# Patient Record
Sex: MALE | Race: WHITE | HISPANIC OR LATINO | Employment: OTHER | URBAN - METROPOLITAN AREA
[De-identification: names, ages, dates, MRNs, and addresses within clinical notes are randomized per-mention and may not be internally consistent; named-entity substitution may affect disease eponyms.]

---

## 2017-12-01 ENCOUNTER — APPOINTMENT (EMERGENCY)
Dept: CT IMAGING | Facility: HOSPITAL | Age: 57
End: 2017-12-01
Payer: MEDICARE

## 2017-12-01 ENCOUNTER — HOSPITAL ENCOUNTER (EMERGENCY)
Facility: HOSPITAL | Age: 57
Discharge: HOME | End: 2017-12-01
Attending: EMERGENCY MEDICINE | Admitting: EMERGENCY MEDICINE
Payer: MEDICARE

## 2017-12-01 VITALS
BODY MASS INDEX: 32.1 KG/M2 | HEIGHT: 64 IN | HEART RATE: 82 BPM | TEMPERATURE: 99.1 F | WEIGHT: 188 LBS | RESPIRATION RATE: 16 BRPM | OXYGEN SATURATION: 99 % | SYSTOLIC BLOOD PRESSURE: 178 MMHG | DIASTOLIC BLOOD PRESSURE: 79 MMHG

## 2017-12-01 DIAGNOSIS — I10 HYPERTENSION: Primary | ICD-10-CM

## 2017-12-01 DIAGNOSIS — R73.9 HYPERGLYCEMIA: ICD-10-CM

## 2017-12-01 DIAGNOSIS — N28.9 RENAL INSUFFICIENCY: ICD-10-CM

## 2017-12-01 LAB
ANION GAP SERPL CALCULATED.3IONS-SCNC: 9 MMOL/L (ref 4–13)
APTT PPP: 26 SECONDS (ref 23–35)
BACTERIA UR QL AUTO: ABNORMAL /HPF
BASOPHILS # BLD AUTO: 0.02 THOUSANDS/ΜL (ref 0–0.1)
BASOPHILS NFR BLD AUTO: 0 % (ref 0–1)
BILIRUB UR QL STRIP: NEGATIVE
BUN SERPL-MCNC: 13 MG/DL (ref 5–25)
CALCIUM SERPL-MCNC: 9.1 MG/DL (ref 8.3–10.1)
CAOX CRY URNS QL MICRO: ABNORMAL /HPF
CHLORIDE SERPL-SCNC: 98 MMOL/L (ref 100–108)
CLARITY UR: ABNORMAL
CO2 SERPL-SCNC: 32 MMOL/L (ref 21–32)
COLOR UR: ABNORMAL
CREAT SERPL-MCNC: 1.84 MG/DL (ref 0.6–1.3)
EOSINOPHIL # BLD AUTO: 0.11 THOUSAND/ΜL (ref 0–0.61)
EOSINOPHIL NFR BLD AUTO: 1 % (ref 0–6)
ERYTHROCYTE [DISTWIDTH] IN BLOOD BY AUTOMATED COUNT: 13 % (ref 11.6–15.1)
EST. AVERAGE GLUCOSE BLD GHB EST-MCNC: 183 MG/DL
GFR SERPL CREATININE-BSD FRML MDRD: 40 ML/MIN/1.73SQ M
GLUCOSE SERPL-MCNC: 289 MG/DL (ref 65–140)
GLUCOSE UR STRIP-MCNC: ABNORMAL MG/DL
HBA1C MFR BLD: 8 % (ref 4.2–6.3)
HCT VFR BLD AUTO: 38.9 % (ref 36.5–49.3)
HGB BLD-MCNC: 13.1 G/DL (ref 12–17)
HGB UR QL STRIP.AUTO: ABNORMAL
INR PPP: 0.95 (ref 0.86–1.16)
KETONES UR STRIP-MCNC: ABNORMAL MG/DL
LEUKOCYTE ESTERASE UR QL STRIP: NEGATIVE
LYMPHOCYTES # BLD AUTO: 2.3 THOUSANDS/ΜL (ref 0.6–4.47)
LYMPHOCYTES NFR BLD AUTO: 29 % (ref 14–44)
MCH RBC QN AUTO: 30.3 PG (ref 26.8–34.3)
MCHC RBC AUTO-ENTMCNC: 33.7 G/DL (ref 31.4–37.4)
MCV RBC AUTO: 90 FL (ref 82–98)
MONOCYTES # BLD AUTO: 0.44 THOUSAND/ΜL (ref 0.17–1.22)
MONOCYTES NFR BLD AUTO: 6 % (ref 4–12)
NEUTROPHILS # BLD AUTO: 4.98 THOUSANDS/ΜL (ref 1.85–7.62)
NEUTS SEG NFR BLD AUTO: 64 % (ref 43–75)
NITRITE UR QL STRIP: NEGATIVE
NON-SQ EPI CELLS URNS QL MICRO: ABNORMAL /HPF
NRBC BLD AUTO-RTO: 0 /100 WBCS
PH UR STRIP.AUTO: 6 [PH] (ref 4.5–8)
PLATELET # BLD AUTO: 233 THOUSANDS/UL (ref 149–390)
PMV BLD AUTO: 11.1 FL (ref 8.9–12.7)
POTASSIUM SERPL-SCNC: 3.5 MMOL/L (ref 3.5–5.3)
PROT UR STRIP-MCNC: ABNORMAL MG/DL
PROTHROMBIN TIME: 12.7 SECONDS (ref 12.1–14.4)
RBC # BLD AUTO: 4.32 MILLION/UL (ref 3.88–5.62)
RBC #/AREA URNS AUTO: ABNORMAL /HPF
SODIUM SERPL-SCNC: 139 MMOL/L (ref 136–145)
SP GR UR STRIP.AUTO: 1.02 (ref 1–1.03)
SPECIMEN SOURCE: NORMAL
TROPONIN I BLD-MCNC: 0 NG/ML (ref 0–0.08)
UROBILINOGEN UR QL STRIP.AUTO: 0.2 E.U./DL
WBC # BLD AUTO: 7.85 THOUSAND/UL (ref 4.31–10.16)
WBC #/AREA URNS AUTO: ABNORMAL /HPF

## 2017-12-01 PROCEDURE — 81001 URINALYSIS AUTO W/SCOPE: CPT

## 2017-12-01 PROCEDURE — 83036 HEMOGLOBIN GLYCOSYLATED A1C: CPT | Performed by: EMERGENCY MEDICINE

## 2017-12-01 PROCEDURE — 36415 COLL VENOUS BLD VENIPUNCTURE: CPT | Performed by: EMERGENCY MEDICINE

## 2017-12-01 PROCEDURE — 85025 COMPLETE CBC W/AUTO DIFF WBC: CPT | Performed by: EMERGENCY MEDICINE

## 2017-12-01 PROCEDURE — 80048 BASIC METABOLIC PNL TOTAL CA: CPT | Performed by: EMERGENCY MEDICINE

## 2017-12-01 PROCEDURE — 85730 THROMBOPLASTIN TIME PARTIAL: CPT | Performed by: EMERGENCY MEDICINE

## 2017-12-01 PROCEDURE — 70450 CT HEAD/BRAIN W/O DYE: CPT

## 2017-12-01 PROCEDURE — 85610 PROTHROMBIN TIME: CPT | Performed by: EMERGENCY MEDICINE

## 2017-12-01 PROCEDURE — 99284 EMERGENCY DEPT VISIT MOD MDM: CPT

## 2017-12-01 PROCEDURE — 84484 ASSAY OF TROPONIN QUANT: CPT

## 2017-12-01 PROCEDURE — 93005 ELECTROCARDIOGRAM TRACING: CPT | Performed by: EMERGENCY MEDICINE

## 2017-12-01 PROCEDURE — 81002 URINALYSIS NONAUTO W/O SCOPE: CPT | Performed by: EMERGENCY MEDICINE

## 2017-12-01 RX ORDER — LOSARTAN POTASSIUM 50 MG/1
100 TABLET ORAL ONCE
Status: COMPLETED | OUTPATIENT
Start: 2017-12-01 | End: 2017-12-01

## 2017-12-01 RX ADMIN — LOSARTAN POTASSIUM 100 MG: 50 TABLET, FILM COATED ORAL at 17:59

## 2017-12-01 RX ADMIN — METOPROLOL TARTRATE 25 MG: 25 TABLET ORAL at 17:58

## 2017-12-02 LAB
ATRIAL RATE: 92 BPM
P AXIS: 62 DEGREES
PR INTERVAL: 162 MS
QRS AXIS: 76 DEGREES
QRSD INTERVAL: 92 MS
QT INTERVAL: 332 MS
QTC INTERVAL: 410 MS
T WAVE AXIS: -87 DEGREES
VENTRICULAR RATE: 92 BPM

## 2017-12-02 NOTE — ED PROVIDER NOTES
History  Chief Complaint   Patient presents with    Hypertension     pt was at [facility] today for check up, was noted to have high blood pressure, high blood sugar, and blurry vision for the last several days and was told to come to ER because he could have a stroke  Pt drove himself to [facility] appointment, drove himslef here to ER, no deficits noted in Triage  History provided by:  Patient and relative   used: Yes    Medical Problem - Major   Location:  Sent in by the primary care doctor for hypertension and there was apparently some concern about a stroke and high blood sugar  Severity:  Moderate  Onset quality:  Unable to specify  Duration: Last several days  Timing:  Intermittent  Progression:  Resolved  Chronicity:  New  Relieved by:  Nothing  Worsened by: Anxiety  Ineffective treatments:  None tried  Associated symptoms: fatigue    Associated symptoms: no abdominal pain, no chest pain, no congestion, no cough, no diarrhea, no fever, no headaches, no nausea, no shortness of breath, no sore throat and no vomiting     Patient with a history of hypertension and diabetes who has been complaining of blurry vision, occasional headaches for the last several days  Was at his family doctor's office when his blood pressure was very high and so sent here to the emergency room for further workup and evaluation  The patient states that he just walked in the office and he took his blood pressure is a little anxious  He is much more relaxed now and his blood pressure has improved  He has no headache  No visual changes the present time  He complains of no weakness or numbness  He has no chest pain or shortness of breath  There is no leg edema  He did not take any of his medicines today prior to his appointment  Prior to Admission Medications   Prescriptions Last Dose Informant Patient Reported? Taking?    apixaban (ELIQUIS) 5 mg 11/30/2017 at Unknown time  No Yes   Sig: Take 1 tablet by mouth 2 (two) times a day for 30 days   insulin aspart protamine-insulin aspart (NovoLOG 70/30) 100 units/mL injection 11/30/2017 at Unknown time  No Yes   Sig: Inject 20 Units under the skin 2 (two) times a day before meals for 30 days   losartan (COZAAR) 100 MG tablet 11/30/2017 at Unknown time  No Yes   Sig: Take 1 tablet by mouth daily for 14 days   metFORMIN (GLUCOPHAGE) 1000 MG tablet 11/30/2017 at Unknown time  No Yes   Sig: Take 1 tablet by mouth 2 (two) times a day with meals for 30 days   metoprolol tartrate (LOPRESSOR) 25 mg tablet 11/30/2017 at Unknown time  No Yes   Sig: Take 1 tablet by mouth 2 (two) times a day for 30 days   omeprazole (PriLOSEC) 20 mg delayed release capsule 11/30/2017 at Unknown time  No Yes   Sig: Take 1 capsule by mouth daily  simvastatin (ZOCOR) 40 mg tablet 11/30/2017 at Unknown time  Yes Yes   Sig: Take 40 mg by mouth daily at bedtime  Facility-Administered Medications: None       Past Medical History:   Diagnosis Date    Diabetes mellitus ([facility] [address] )     Hyperlipidemia     Hypertension     Tobacco abuse        Past Surgical History:   Procedure Laterality Date    APPENDECTOMY      SHOULDER ARTHROSCOPY         History reviewed  No pertinent family history  I have reviewed and agree with the history as documented  Social History   Substance Use Topics    Smoking status: Current Every Day Smoker     Packs/day: 1 00     Types: Cigarettes    Smokeless tobacco: Never Used    Alcohol use No        Review of Systems   Constitutional: Positive for fatigue  Negative for chills and fever  HENT: Negative for congestion and sore throat  Respiratory: Negative for cough, chest tightness and shortness of breath  Cardiovascular: Negative for chest pain and leg swelling  Gastrointestinal: Negative for abdominal pain, diarrhea, nausea and vomiting  Genitourinary: Negative for dysuria  Musculoskeletal: Negative for arthralgias, back pain and neck pain     Neurological: Negative for dizziness, facial asymmetry, speech difficulty, weakness, light-headedness, numbness and headaches  All other systems reviewed and are negative  Physical Exam  ED Triage Vitals [12/01/17 1659]   Temperature Pulse Respirations Blood Pressure SpO2   99 1 °F (37 3 °C) 87 18 (!) 172/87 98 %      Temp Source Heart Rate Source Patient Position - Orthostatic VS BP Location FiO2 (%)   Temporal Monitor Sitting Left arm --      Pain Score       No Pain           Orthostatic Vital Signs  Vitals:    12/01/17 1659 12/01/17 1756 12/01/17 1855   BP: (!) 172/87 169/78 (!) 178/79   Pulse: 87 90 82   Patient Position - Orthostatic VS: Sitting Lying Lying       Physical Exam   Constitutional: He appears well-developed and well-nourished  He is cooperative  Non-toxic appearance  He does not have a sickly appearance  He does not appear ill  No distress  HENT:   Head: Normocephalic and atraumatic  Right Ear: Hearing normal  No drainage or swelling  Left Ear: Hearing normal  No drainage or swelling  Mouth/Throat: Mucous membranes are normal    Eyes: Conjunctivae, EOM and lids are normal  Pupils are equal, round, and reactive to light  Right eye exhibits no discharge  Left eye exhibits no discharge  Neck: Trachea normal and normal range of motion  Neck supple  No JVD present  Cardiovascular: Normal rate, regular rhythm, normal heart sounds, intact distal pulses and normal pulses  Exam reveals no gallop and no friction rub  No murmur heard  Pulmonary/Chest: Effort normal and breath sounds normal  No stridor  No respiratory distress  He has no wheezes  He has no rales  He exhibits no tenderness  Abdominal: Soft  Normal appearance  He exhibits no ascites and no mass  There is no hepatosplenomegaly  There is no tenderness  There is no rebound, no guarding and no CVA tenderness  Musculoskeletal: Normal range of motion  He exhibits no edema  Lymphadenopathy:     He has no cervical adenopathy          Right: No inguinal adenopathy present  Left: No inguinal adenopathy present  Neurological: He is alert  He has normal strength  No cranial nerve deficit or sensory deficit  He exhibits normal muscle tone  Coordination and gait normal  GCS eye subscore is 4  GCS verbal subscore is 5  GCS motor subscore is 6  Skin: Skin is warm, dry and intact  No rash noted  He is not diaphoretic  No pallor  Psychiatric: He has a normal mood and affect  His speech is normal  Cognition and memory are normal    Nursing note and vitals reviewed        ED Medications  Medications   losartan (COZAAR) tablet 100 mg (100 mg Oral Given 12/1/17 1759)   metoprolol tartrate (LOPRESSOR) tablet 25 mg (25 mg Oral Given 12/1/17 1758)       Diagnostic Studies  Results Reviewed     Procedure Component Value Units Date/Time    Hemoglobin A1c [[id number]]  (Abnormal) Collected:  12/01/17 1751    Lab Status:  Final result Specimen:  Blood from Arm, Right Updated:  12/01/17 2156     Hemoglobin A1C 8 0 (H) %       mg/dl     Urine Microscopic [[id number]]  (Abnormal) Collected:  12/01/17 1922    Lab Status:  Final result Specimen:  Urine from Urine, Clean Catch Updated:  12/01/17 1951     RBC, UA 2-4 (A) /hpf      WBC, UA 0-1 (A) /hpf      Epithelial Cells Occasional /hpf      Bacteria, UA None Seen /hpf      Ca Oxalate Monique, UA Occasional (A) /hpf     POCT urinalysis dipstick [[id number]]  (Abnormal) Resulted:  12/01/17 1907    Lab Status:  Final result Specimen:  Urine Updated:  12/01/17 1907    ED Urine Macroscopic [[id number]]  (Abnormal) Collected:  12/01/17 1922    Lab Status:  Final result Specimen:  Urine Updated:  12/01/17 1906     Color, UA [place]     Clarity, UA Slightly Cloudy     pH, UA 6 0     Leukocytes, UA Negative     Nitrite, UA Negative     Protein,  (2+) (A) mg/dl      Glucose,  (1/2%) (A) mg/dl      Ketones, UA 40 (2+) (A) mg/dl      Urobilinogen, UA 0 2 E U /dl      Bilirubin, UA Negative     Blood, UA Trace (A) Specific [place], UA 1 025    Narrative:       CLINITEK RESULT    CBC and differential [[id number]]  (Normal) Collected:  12/01/17 1751    Lab Status:  Final result Specimen:  Blood from Arm, Right Updated:  12/01/17 1821     WBC 7 85 Thousand/uL      RBC 4 32 Million/uL      Hemoglobin 13 1 g/dL      Hematocrit 38 9 %      MCV 90 fL      MCH 30 3 pg      MCHC 33 7 g/dL      RDW 13 0 %      MPV 11 1 fL      Platelets 447 Thousands/uL      nRBC 0 /100 WBCs      Neutrophils Relative 64 %      Lymphocytes Relative 29 %      Monocytes Relative 6 %      Eosinophils Relative 1 %      Basophils Relative 0 %      Neutrophils Absolute 4 98 Thousands/µL      Lymphocytes Absolute 2 30 Thousands/µL      Monocytes Absolute 0 44 Thousand/µL      Eosinophils Absolute 0 11 Thousand/µL      Basophils Absolute 0 02 Thousands/µL     Protime-INR [[id number]]  (Normal) Collected:  12/01/17 1751    Lab Status:  Final result Specimen:  Blood from Arm, Right Updated:  12/01/17 1816     Protime 12 7 seconds      INR 0 95    APTT [[id number]]  (Normal) Collected:  12/01/17 1751    Lab Status:  Final result Specimen:  Blood from Arm, Right Updated:  12/01/17 1816     PTT 26 seconds     Narrative: Therapeutic Heparin Range = 60-90 seconds    Basic metabolic panel [[id number]]  (Abnormal) Collected:  12/01/17 1751    Lab Status:  Final result Specimen:  Blood from Arm, Right Updated:  12/01/17 1812     Sodium 139 mmol/L      Potassium 3 5 mmol/L      Chloride 98 (L) mmol/L      CO2 32 mmol/L      Anion Gap 9 mmol/L      BUN 13 mg/dL      Creatinine 1 84 (H) mg/dL      Glucose 289 (H) mg/dL      Calcium 9 1 mg/dL      eGFR 40 ml/min/1 73sq m     Narrative:         National Kidney Disease Education Program recommendations are as follows:  GFR calculation is accurate only with a steady state creatinine  Chronic Kidney disease less than 60 ml/min/1 73 sq  meters  Kidney failure less than 15 ml/min/1 73 sq  meters      POCT troponin [[id number]] (Normal) Collected:  12/01/17 1754    Lab Status:  Final result Updated:  12/01/17 1806     POC Troponin I 0 00 ng/ml      Specimen Type VENOUS    Narrative:         [employer] i-Stat handheld analyzer 99% cutoff is > 0 08ng/mL in network Emergency Departments    o cTnI 99% cutoff is useful only when applied to patients in the clinical setting of myocardial ischemia  o cTnI 99% cutoff should be interpreted in the context of clinical history, ECG findings and possibly cardiac imaging to establish correct diagnosis  o cTnI 99% cutoff may be suggestive but clearly not indicative of a coronary event without the clinical setting of myocardial ischemia  CT head without contrast   Final Result by Sunday Randhawa MD (12/01 1826)      No acute intracranial abnormality  Workstation performed: [id number]                    Procedures  ECG 12 Lead Documentation  Date/Time: 12/1/2017 8:00 PM  Performed by: Linda Robertson by: Anatoly Wahl     Indications / Diagnosis:  Htn  Patient location:  ED  Interpretation:     Interpretation: non-specific    Rate:     ECG rate:  92    ECG rate assessment: normal    Rhythm:     Rhythm: sinus rhythm    Ectopy:     Ectopy: none    QRS:     QRS axis:  Normal    QRS intervals:  Normal  Conduction:     Conduction: normal    ST segments:     ST segments:  Non-specific  T waves:     T waves: normal             Phone Contacts  ED Phone Contact    ED Course  ED Course                                MDM  Number of Diagnoses or Management Options  Hyperglycemia:   Hypertension:   Renal insufficiency:   Diagnosis management comments: Patient has little worsening renal insufficiency  He also has some protein in his urine  I did discuss this with all members present in the room and he was told that he has some kidney issues she will need to follow up with a nephrologist   His blood pressure is much better  Head CT was negative   He is neurologically normal        Amount and/or Complexity of Data Reviewed  Clinical lab tests: reviewed and ordered  Tests in the radiology section of CPT®: ordered and reviewed  Tests in the medicine section of CPT®: ordered and reviewed    Patient Progress  Patient progress: stable    CritCare Time    Disposition  Final diagnoses:   Hypertension   Hyperglycemia   Renal insufficiency     Time reflects when diagnosis was documented in both MDM as applicable and the Disposition within this note     Time User Action Codes Description Comment    12/1/2017  6:54 PM Cipriano Panaca Add [I10] Hypertension     12/1/2017  6:55 PM Cipriano Panaca Add [R73 9] Hyperglycemia     12/1/2017  6:55 PM Cipriano Panaca Add [N28 9] Renal insufficiency       ED Disposition     ED Disposition Condition Comment    Discharge  [facility] discharge to home/self care      Condition at discharge: Good        Follow-up Information     Follow up With Specialties Details [facility]   [facility] [id number]  [address] [facility] Schedule an appointment as soon as possible for a visit in 1 week  [facility] [id number] [phone]        Discharge Medication List as of 12/1/2017  7:59 PM      CONTINUE these medications which have NOT CHANGED    Details   apixaban (ELIQUIS) 5 mg Take 1 tablet by mouth 2 (two) times a day for 30 days, Starting Fri 9/30/2016, Until Fri 12/1/2017, Print      insulin aspart protamine-insulin aspart (NovoLOG 70/30) 100 units/mL injection Inject 20 Units under the skin 2 (two) times a day before meals for 30 days, Starting Fri 9/30/2016, Until Fri 12/1/2017, Print      losartan (COZAAR) 100 MG tablet Take 1 tablet by mouth daily for 14 days, Starting Fri 9/30/2016, Until Fri 12/1/2017, Print      metFORMIN (GLUCOPHAGE) 1000 MG tablet Take 1 tablet by mouth 2 (two) times a day with meals for 30 days, Starting Fri 9/30/2016, Until Fri 12/1/2017, Print      metoprolol tartrate (LOPRESSOR) 25 mg tablet Take 1 tablet by mouth 2 (two) times a day for 30 days, Starting Fri 9/30/2016, Until Fri 12/1/2017, Print      omeprazole (PriLOSEC) 20 mg delayed release capsule Take 1 capsule by mouth daily  , Starting 9/25/2016, Until Discontinued, Print      simvastatin (ZOCOR) 40 mg tablet Take 40 mg by mouth daily at bedtime  , Until Discontinued, Historical Med           No discharge procedures on file      ED Provider  Electronically Signed by           Severa Peyer, MD  12/02/17 9105

## 2017-12-02 NOTE — DISCHARGE INSTRUCTIONS
Hipertensión crónica   LO QUE NECESITA SABER:   La hipertensión es la presión arterial dipak  La presión arterial es la fuerza que ejerce la zora contra las bowie de las arterias  La presión arterial normal debería estar a menos de 120/80  La pre-hipertensión estaría entre 120/80 y 139/ 80  La presión arterial dipak estaría a 140/90 o más dipak  La hipertensión causa que moran presión arterial se eleve tanto que moran corazón se ve forzado a trabajar [employer] de lo normal  [place] puede dañar moran corazón  La hipertensión crónica es [place] condición de venita plazo que usted puede controlar con un estilo de [place] o con medicamentos  La presión [place] a proteger jessica órganos suzanne moran corazón, pulmones, cerebro, y riñones  INSTRUCCIONES SOBRE EL DIPAK HOSPITALARIA:   Llame al 911 en alethea de presentar lo siguiente:   · Usted tiene malestar en el pecho que se siente suzanne estrujamiento, presión, Monik Macarena o dolor  · Usted se siente confundido o tiene dificultad para hablar  · Repentinamente se siente aturdido o con dificultad para respirar  · Usted tiene dolor o [employer] espalda, [place], [place], abdomen o [place]  Regrese a la tristin de emergencias si:   · Usted tiene un yocasta dolor de sunita o pérdida de la visión  · Usted tiene debilidad en un brazo o en kenton pierna  Pregúntele a moran Claryce Levans vitaminas y minerales son adecuados para usted  · Usted se siente mareado, confundido, somnoliento o suzanne si se fuera a desmayar  · Usted se ha tomado moran medicamento para la presión arterial hiro moran presión arterial todavía está más dipak de lo que le indicó moran médico     · Usted tiene preguntas o inquietudes acerca de moran condición o cuidado  Medicamentos:  Es posible que usted necesite alguno de los siguientes:  · Medicamento  podría usarse para ayudar a disminuir la presión arterial  Es posible que necesite más de un tipo de [place]  Clive el medicamento exactamente suzanne indicado  · Diuréticos  ayudan a eliminar el exceso de líquido que se acumula en el organismo  [place] contribuirá a bajar moran presión arterial  Es posible que orine más seguido mientras beverley rodo medicamento  · Los medicamentos para el colesterol  ayudan a bajar los niveles de [place]  Un nivel bajo de colesterol ayuda a prevenir enfermedades cardíacas y facilita el control de la presión arterial      · [place] jessica medicamentos suzanne se le haya indicado  Consulte con moran médico si usted dee dee que moran medicamento no le está ayudando o si presenta efectos secundarios  Infórmele si es alérgico a cualquier medicamento  Mantenga kenton lista actualizada de los [place], las vitaminas y los productos herbales que beverley  Incluya los siguientes datos de los medicamentos: cantidad, frecuencia y motivo de administración  Traiga con usted la lista o los envases de la píldoras a jessica citas de seguimiento  Lleve la lista de los medicamentos con usted en alethea de k[place] emergencia  Acuda a jessica consultas de control con moran médico según le indicaron  Usted necesitará regresar para medir moran presión arterial y realizar otros exámenes de laboratorio  Anote jessica preguntas para que se acuerde de hacerlas yudelka jessica visitas  Controle la hipertensión crónica:  Hable con moran médico sobre las siguientes recomendaciones y otras formas de controlar la hipertensión:  · Tómese la presión arterial en moran casa  Siéntese y descanse por 5 minutos antes de tomarse la presión arterial  Extienda moran brazo y apóyelo en kenton superficie plana  Moran brazo debe estar a la misma altura que moran corazón  Siga las instrucciones que vienen con el monitor para la presión arterial o tensiómetro  Si es posible tome por lo menos 2 lecturas de la presión cada vez  Tómese la presión arterial por lo AC Immune SA al día a la misma hora todos los días, [place] en la mañana y la otra en la noche  Mantenga un registro de las lecturas de moran presión arterial y llévelo consigo a jessica consultas  Pregúntele a moran médico cuál debería ser moran presión arterial            · Limite el sodio (la sal) suzanne se le haya indicado  Demasiado sodio puede afectar el equilibrio de líquidos  Revise las etiquetas para buscar alimentos bajos en sodio o sin sal agregada  Algunos alimentos bajos en sodio utilizan sales de potasio para añadir sabor  Demasiado potasio también puede causar problemas de [place]  Moran médico le dirá qué cantidad de sodio y potasio es mcclellan para el consumo en un día  Él puede recomendarle que limite el sodio a 2,300 mg al día  · Siga el plan de comidas recomendado por moran médico   Un dietista o médico puede darle más información sobre planes de bajo contenido de sodio o el plan de alimentación DASH (enfoques dietéticos para detener la hipertensión)  El plan DASH es bajo en sodio, grasas saturadas y grasa total  Es alto en potasio, calcio y [place]  · Ejercítese para mantener un peso saludable  Realice actividad física por lo menos 30 minutos al día, la mayoría de los días de la [place]  [place] ayudará a bajar moran presión arterial  Pida más información acerca de un plan de ejercicio adecuado para usted  · [address] [place] [address] estrés  [place] podría ayudarlo a bajar moran presión arterial  Aprenda sobre formas de relajarse, suzanne respiración profunda o escuchar música  · Limite el consumo de alcohol  Las mujeres deberían limitar el consumo de alcohol a 1 bebida por día  Los hombres deberían limitar el consumo de alcohol a 2 tragos al día  Un trago equivale a 12 onzas de cerveza, 5 onzas de vino o 1 onza y ½ de licor  · No fume  La nicotina y otros químicos en los cigarrillos y cigarros pueden aumentar moran presión arterial y también pueden provocar daño al pulmón  Pida información a moran médico si usted actualmente fuma y necesita ayuda para dejar de fumar  Los cigarrillos electrónicos o tabaco sin humo todavía contienen nicotina  Consulte con moran médico antes de [employer]    © 2017 [facility] Information is for End User's use only and may not be sold, redistributed or otherwise used for commercial purposes  All illustrations and images included in CareNotes® are the copyrighted property of A D A M , Inc  or Abisai Pike  Esta información es sólo para uso en educación  Moran intención no es darle un consejo médico sobre enfermedades o tratamientos  Colsulte con moran [place] farmacéutico antes de seguir cualquier régimen médico para saber si es seguro y efectivo para usted  Insuficiencia renal   LO QUE NECESITA SABER:   La insuficiencia renal es cuando los riñones no funcionan bonner vernell suzanne deberían  Normalmente, los riñones The Martir-Esteban líquidos, químicos y desechos de la zora  Estos desechos son expulsados de moran cuerpo por medio de la orina que jessica riñones producen  En altehea que la insuficiencia renal empeore o no reciba tratamiento, podría conducir a k[place] enfermedad crónica (a venita plazo) o fallo renal        INSTRUCCIONES SOBRE EL DIPAK HOSPITALARIA:   Regrese a la tristin de emergencias si:   · Usted tiene retención de líquido en jessica piernas  · Usted tiene dificultad para respirar  · Usted orina menos que de [place]  · Moran orina tiene kenton tonalidad [place]  Pregúntele a moran Selina Embs vitaminas y minerales son adecuados para usted  · Usted tiene fiebre  · Usted tiene dolor abdominal o lumbar  · Usted tiene comezón o sarpullido en la piel  · Usted tiene náusea, vomita repetidamente o tiene diarrea severa  · Usted tiene fatiga o debilidad muscular  · Usted tiene hipo que no cesa  · Usted tiene preguntas o inquietudes acerca de moran condición o cuidado  Acuda a jessica consultas de control con moran médico según le indicaron  Usted tendrá que regresar para que le ordenen exámenes para averiguar la causa de moran insuficiencia renal  Anote jessica preguntas para que se acuerde de hacerlas yudelka jessica visitas     Controle la función de los riñones:   · Controle otras afecciones de bailey  suzanne la diabetes, hipertensión o enfermedades cardíacas  Estas enfermedades afectan jessica riñones  · Hable con snider médico antes de mray medicamentos sin receta médica  Los analgésicos antiinflamatorios no esteroides, los medicamentos para el estómago o laxantes pueden ocasionar daño a los riñones  · Limite el consumo de alcohol  Pregunte cuál es la cantidad de alcohol que usted puede beber sin peligro  Un trago equivale a 12 onzas de cerveza, 5 onzas de vino o 1 onza y ½ de licor  · No fume  La nicotina puede dañar los vasos sanguíneos y complicar la insuficiencia renal  El tabaquismo también daña jessica riñones  No use cigarrillos electrónicos o tabaco sin humo en vez de cigarrillos o para tratar de dejar de fumar  Todos estos aún contienen nicotina  Pida a snider médico información si usted fuma actualmente y [place] para dejar de hacerlo  © 2017 [address] Information is for End User's use only and may not be sold, redistributed or otherwise used for commercial purposes  All illustrations and images included in CareNotes® are the copyrighted property of A D A M , Inc  or [address]  Esta información es sólo para uso en educación  Snider intención no es darle un consejo médico sobre enfermedades o tratamientos  Colsulte con snider Louise Cables farmacéutico antes de seguir cualquier régimen médico para saber si es seguro y efectivo para usted

## 2018-08-22 ENCOUNTER — TELEPHONE (OUTPATIENT)
Dept: OTHER | Facility: HOSPITAL | Age: 58
End: 2018-08-22

## 2018-08-22 DIAGNOSIS — I10 ESSENTIAL HYPERTENSION: Primary | ICD-10-CM

## 2018-08-22 RX ORDER — LOSARTAN POTASSIUM 100 MG/1
100 TABLET ORAL DAILY
Qty: 30 TABLET | Refills: 0 | Status: SHIPPED | OUTPATIENT
Start: 2018-08-22 | End: 2018-10-08 | Stop reason: SDUPTHER

## 2018-08-22 NOTE — PROGRESS NOTES
Unable to refill basaglar as patient last filled 12/2017 and has not been seen since and not on his last medication reconciliation done in same month  Called patient but phone number is "not available at this time"  Will refill losartan as per pharmacy was last filled 5/11/2018  Patient needs to come in to be seen by PCP, tasked to  if they have better luck, if not a letter should be sent to the patients address

## 2018-10-08 ENCOUNTER — OFFICE VISIT (OUTPATIENT)
Dept: FAMILY MEDICINE CLINIC | Facility: CLINIC | Age: 58
End: 2018-10-08
Payer: COMMERCIAL

## 2018-10-08 ENCOUNTER — TELEPHONE (OUTPATIENT)
Dept: FAMILY MEDICINE CLINIC | Facility: CLINIC | Age: 58
End: 2018-10-08

## 2018-10-08 VITALS
DIASTOLIC BLOOD PRESSURE: 140 MMHG | OXYGEN SATURATION: 97 % | RESPIRATION RATE: 16 BRPM | HEIGHT: 64 IN | HEART RATE: 95 BPM | SYSTOLIC BLOOD PRESSURE: 210 MMHG | WEIGHT: 183 LBS | BODY MASS INDEX: 31.24 KG/M2 | TEMPERATURE: 97.6 F

## 2018-10-08 DIAGNOSIS — Z72.0 TOBACCO ABUSE: ICD-10-CM

## 2018-10-08 DIAGNOSIS — G89.29 CHRONIC MIDLINE LOW BACK PAIN WITHOUT SCIATICA: ICD-10-CM

## 2018-10-08 DIAGNOSIS — E11.9 DM2 (DIABETES MELLITUS, TYPE 2) (HCC): Primary | ICD-10-CM

## 2018-10-08 DIAGNOSIS — G89.29 CHRONIC LEFT SHOULDER PAIN: ICD-10-CM

## 2018-10-08 DIAGNOSIS — E11.65 TYPE 2 DIABETES MELLITUS WITH HYPERGLYCEMIA, WITH LONG-TERM CURRENT USE OF INSULIN (HCC): Primary | ICD-10-CM

## 2018-10-08 DIAGNOSIS — M54.50 CHRONIC MIDLINE LOW BACK PAIN WITHOUT SCIATICA: ICD-10-CM

## 2018-10-08 DIAGNOSIS — M25.512 CHRONIC LEFT SHOULDER PAIN: ICD-10-CM

## 2018-10-08 DIAGNOSIS — I10 ESSENTIAL HYPERTENSION: ICD-10-CM

## 2018-10-08 DIAGNOSIS — R06.83 SNORING: ICD-10-CM

## 2018-10-08 DIAGNOSIS — F51.01 PRIMARY INSOMNIA: ICD-10-CM

## 2018-10-08 DIAGNOSIS — E78.5 HYPERLIPIDEMIA, UNSPECIFIED HYPERLIPIDEMIA TYPE: ICD-10-CM

## 2018-10-08 DIAGNOSIS — Z79.4 TYPE 2 DIABETES MELLITUS WITH HYPERGLYCEMIA, WITH LONG-TERM CURRENT USE OF INSULIN (HCC): ICD-10-CM

## 2018-10-08 DIAGNOSIS — E11.65 TYPE 2 DIABETES MELLITUS WITH HYPERGLYCEMIA, WITH LONG-TERM CURRENT USE OF INSULIN (HCC): ICD-10-CM

## 2018-10-08 DIAGNOSIS — Z79.4 TYPE 2 DIABETES MELLITUS WITH HYPERGLYCEMIA, WITH LONG-TERM CURRENT USE OF INSULIN (HCC): Primary | ICD-10-CM

## 2018-10-08 DIAGNOSIS — Z23 NEED FOR INFLUENZA VACCINATION: ICD-10-CM

## 2018-10-08 DIAGNOSIS — K21.9 GASTROESOPHAGEAL REFLUX DISEASE WITHOUT ESOPHAGITIS: ICD-10-CM

## 2018-10-08 PROCEDURE — 90471 IMMUNIZATION ADMIN: CPT | Performed by: INTERNAL MEDICINE

## 2018-10-08 PROCEDURE — 3725F SCREEN DEPRESSION PERFORMED: CPT | Performed by: INTERNAL MEDICINE

## 2018-10-08 PROCEDURE — 90682 RIV4 VACC RECOMBINANT DNA IM: CPT | Performed by: INTERNAL MEDICINE

## 2018-10-08 PROCEDURE — 99214 OFFICE O/P EST MOD 30 MIN: CPT | Performed by: INTERNAL MEDICINE

## 2018-10-08 RX ORDER — RANITIDINE 150 MG/1
150 CAPSULE ORAL 2 TIMES DAILY
Qty: 60 CAPSULE | Refills: 0 | Status: SHIPPED | OUTPATIENT
Start: 2018-10-08 | End: 2018-11-01 | Stop reason: SDUPTHER

## 2018-10-08 RX ORDER — ZOLPIDEM TARTRATE 10 MG/1
10 TABLET ORAL
Qty: 30 TABLET | Refills: 0 | Status: SHIPPED | OUTPATIENT
Start: 2018-10-08 | End: 2018-10-08 | Stop reason: SDUPTHER

## 2018-10-08 RX ORDER — CARISOPRODOL 250 MG/1
250 TABLET ORAL 4 TIMES DAILY
Qty: 120 TABLET | Refills: 0 | Status: SHIPPED | OUTPATIENT
Start: 2018-10-08 | End: 2018-10-08 | Stop reason: SDUPTHER

## 2018-10-08 RX ORDER — LOSARTAN POTASSIUM 100 MG/1
100 TABLET ORAL DAILY
Qty: 30 TABLET | Refills: 0 | Status: SHIPPED | OUTPATIENT
Start: 2018-10-08 | End: 2018-11-01 | Stop reason: SDUPTHER

## 2018-10-08 RX ORDER — OXYCODONE HYDROCHLORIDE 30 MG/1
30 TABLET ORAL EVERY 4 HOURS PRN
Qty: 120 TABLET | Refills: 0 | Status: SHIPPED | OUTPATIENT
Start: 2018-10-08 | End: 2018-11-05 | Stop reason: SDUPTHER

## 2018-10-08 RX ORDER — ZOLPIDEM TARTRATE 10 MG/1
10 TABLET ORAL
Qty: 30 TABLET | Refills: 0 | Status: SHIPPED | OUTPATIENT
Start: 2018-10-08 | End: 2018-11-05 | Stop reason: SDUPTHER

## 2018-10-08 RX ORDER — SIMVASTATIN 40 MG
40 TABLET ORAL
Qty: 30 TABLET | Refills: 0 | Status: SHIPPED | OUTPATIENT
Start: 2018-10-08 | End: 2018-11-01 | Stop reason: SDUPTHER

## 2018-10-08 RX ORDER — CARISOPRODOL 250 MG/1
250 TABLET ORAL 4 TIMES DAILY
Qty: 120 TABLET | Refills: 0 | Status: SHIPPED | OUTPATIENT
Start: 2018-10-08 | End: 2019-02-07

## 2018-10-08 RX ORDER — OXYCODONE HYDROCHLORIDE 30 MG/1
30 TABLET ORAL EVERY 4 HOURS PRN
Qty: 120 TABLET | Refills: 0 | Status: SHIPPED | OUTPATIENT
Start: 2018-10-08 | End: 2018-10-08 | Stop reason: SDUPTHER

## 2018-10-08 NOTE — TELEPHONE ENCOUNTER
Patient was seen with Dr Radha Gonsales but pt pcp is Dr Garcia  Patient was told to RTO in two weeks  Dr Radha Gonsales only has same day appt available  Dr Garcia has nothing until three weeks or four weeks also same day appts  I offer patient an appt with Jerry Edwards who has openings but pt refused to schedule with Jerry Edwards

## 2018-10-09 DIAGNOSIS — Z79.4 TYPE 2 DIABETES MELLITUS WITH HYPERGLYCEMIA, WITH LONG-TERM CURRENT USE OF INSULIN (HCC): Primary | ICD-10-CM

## 2018-10-09 DIAGNOSIS — E11.65 TYPE 2 DIABETES MELLITUS WITH HYPERGLYCEMIA, WITH LONG-TERM CURRENT USE OF INSULIN (HCC): Primary | ICD-10-CM

## 2018-10-09 RX ORDER — PEN NEEDLE, DIABETIC 31 GX5/16"
NEEDLE, DISPOSABLE MISCELLANEOUS
Qty: 100 EACH | Refills: 0 | Status: SHIPPED | OUTPATIENT
Start: 2018-10-09 | End: 2019-01-10 | Stop reason: SDUPTHER

## 2018-10-09 RX ORDER — LANCETS
EACH MISCELLANEOUS 3 TIMES DAILY
Qty: 100 EACH | Refills: 2 | Status: SHIPPED | OUTPATIENT
Start: 2018-10-09 | End: 2019-02-07 | Stop reason: SDUPTHER

## 2018-10-09 RX ORDER — LANCETS
EACH MISCELLANEOUS
COMMUNITY
Start: 2017-07-28 | End: 2018-10-09 | Stop reason: SDUPTHER

## 2018-10-09 NOTE — TELEPHONE ENCOUNTER
----- Message from Radha Broderick MD sent at 10/9/2018  6:49 AM EDT -----  Regarding: Glucometer  Patient needs new glucometer and testing supplies  TID testing, on insulin for diabetes

## 2018-10-11 DIAGNOSIS — Z91.19 H/O NONCOMPLIANCE WITH MEDICAL TREATMENT, PRESENTING HAZARDS TO HEALTH: Primary | ICD-10-CM

## 2018-10-11 PROBLEM — K21.9 GASTROESOPHAGEAL REFLUX DISEASE WITHOUT ESOPHAGITIS: Status: ACTIVE | Noted: 2018-10-11

## 2018-10-11 NOTE — PROGRESS NOTES
Assessment/Plan:    Type 2 diabetes mellitus with hyperglycemia (HCC)  Lab Results   Component Value Date    HGBA1C 8 0 (H) 12/01/2017       No results for input(s): POCGLU in the last 72 hours      Blood Sugar Average: Last 72 hrs:     Patient has been off all diabetic medications because he had no insurance and was unable to see doctor/get medications  Last HbA1c as listed above, will order another, but highly suspect it will be uncontrolled due to medication noncompliance/inability to get medications  Patient previously on Novolog 70/30 HS, will start on Lantus 20U HS because patient doesn't require short acting insulin at this time until better idea of long acting effectiveness is established  Restarted patient back on metformin  Counseled extensively on diet and exercise  Advised and educated on home glucose testing and logs, new monitor sent to pharmacy with supplies      Hypertension  Patient currently uncontrolled and off all medications due to lack of insurance coverage  Counseled on diet and exercise, particularly consumption of low sodium and processed foods  Educated on home blood pressure logs  Advised smoking cessation  Restarted previous regimen of cozaar and lopressor      Hyperlipidemia  Patient counseled on diet and exercise  Advised to restart statin medication      Tobacco abuse  Counseled patient on smoking cessation  Educated on quitting hotline and other available resources  Patient acknowledges risk of continued tobacco use      Primary insomnia  Patient was previously being prescribed zolpidem through pain management  Refilled prescription  Will work to taper patient off sleep aid, especially in light of possible sleep apnea  Sleep study ordered  Counseled patient on smoking cessation and healthy sleep habits    Chronic left shoulder pain  Previously affiliated with pain management, however was privately owned and doctor retired  Refilled narcotic pain medications as were being prescribed by previous pain management physician after reviewing medical records    Chronic midline low back pain without sciatica  As above under shoulder pain  Secondary to work related injury in 2015    Snoring  Many risk factors for sleep apnea as outlined in sleep study referral  Counseled on smoking cessation    Gastroesophageal reflux disease without esophagitis  Counseled patient on dietary modifications and specific diet recommendations that will help to best control GERD symptoms  These practices would include limiting or eliminating caffeinated foods and beverages including chocolate, coffee and soda  Also limiting the intake of spicy foods or anything else that exacerbate symptoms  Will restart patient on PPI blocker  Advised nothing by mouth at least 2-3 hours prior to lying down for bed  Advise placing the head of the bed on riser is to assist in gravity keeping stomach acid down         Diagnoses and all orders for this visit:    Type 2 diabetes mellitus with hyperglycemia, with long-term current use of insulin ([address] )  -     HEMOGLOBIN A1C W/ EAG ESTIMATION; Future  -     Discontinue: metFORMIN (GLUCOPHAGE) 1000 MG tablet; Take 1 tablet (1,000 mg total) by mouth 2 (two) times a day with meals for 30 days  -     insulin glargine (LANTUS SOLOSTAR) 100 units/mL injection pen; Inject 20 Units under the skin daily at bedtime  -     Comprehensive metabolic panel; Future    Need for influenza vaccination  -     influenza vaccine, 0727-3345, quadrivalent, recombinant, PF, 0 5 mL, for patients 18 yr+ (FLUBLOK)    Chronic midline low back pain without sciatica  -     Discontinue: oxyCODONE (ROXICODONE) 30 MG immediate release tablet; Take 1 tablet (30 mg total) by mouth every 4 (four) hours as needed for moderate pain Earliest Fill Date: 10/8/18 Max Daily Amount: 180 mg  -     Discontinue: carisoprodol (SOMA) 250 MG;  Take 1 tablet (250 mg total) by mouth 4 (four) times a day    Chronic left shoulder pain  -     Discontinue: oxyCODONE (ROXICODONE) 30 MG immediate release tablet; Take 1 tablet (30 mg total) by mouth every 4 (four) hours as needed for moderate pain Earliest Fill Date: 10/8/18 Max Daily Amount: 180 mg  -     Discontinue: carisoprodol (SOMA) 250 MG; Take 1 tablet (250 mg total) by mouth 4 (four) times a day    Primary insomnia  -     Discontinue: zolpidem (AMBIEN) 10 mg tablet; Take 1 tablet (10 mg total) by mouth daily at bedtime as needed for sleep    Essential hypertension  -     losartan (COZAAR) 100 MG tablet; Take 1 tablet (100 mg total) by mouth daily for 30 days  -     metoprolol tartrate (LOPRESSOR) 25 mg tablet; Take 1 tablet (25 mg total) by mouth 2 (two) times a day for 30 days    Hyperlipidemia, unspecified hyperlipidemia type  -     simvastatin (ZOCOR) 40 mg tablet; Take 1 tablet (40 mg total) by mouth daily at bedtime    Tobacco abuse    Gastroesophageal reflux disease without esophagitis  -     ranitidine (ZANTAC) 150 MG capsule; Take 1 capsule (150 mg total) by mouth 2 (two) times a day    Snoring  -     Diagnostic Sleep Study; Future          Subjective:      Patient ID: Raj Bhagat is a 65 y o  male  Patient comes into clinic today with his wife  He lost his health insurance several months ago and has been off all his medications and without diabetic supplies  His last appointment with pain management was in August 2018 and the physician sent all previous medical records to [facility] in light of his FDC  He complains of back and shoulder pain today which is chronic in nature  He continues to smoke  Denies nausea or vomiting, but does complain of symptoms consistent reflux including esophageal burning with and without meals          The following portions of the patient's history were reviewed and updated as appropriate: allergies, current medications, past family history, past medical history, past social history, past surgical history and problem list     Review of Systems Constitutional: Negative for activity change, chills, fatigue and fever  HENT: Negative for congestion, sinus pain, sinus pressure and sore throat  Respiratory: Negative for cough, chest tightness, shortness of breath and wheezing  Cardiovascular: Negative for chest pain and palpitations  Gastrointestinal: Negative for abdominal pain, constipation, diarrhea, nausea and vomiting  Genitourinary: Negative for difficulty urinating, dysuria, frequency and hematuria  Musculoskeletal: Positive for arthralgias, back pain, myalgias and neck pain  Skin: Negative for rash  Allergic/Immunologic: Negative for environmental allergies  Neurological: Negative for light-headedness and headaches  Psychiatric/Behavioral: Negative for confusion and hallucinations  Objective:      BP (!) 210/140 (BP Location: Left arm, Patient Position: Sitting, Cuff Size: Adult)   Pulse 95   Temp 97 6 °F (36 4 °C) (Tympanic)   Resp 16   Ht 5' 4" (1 626 m)   Wt 83 kg (183 lb)   SpO2 97%   BMI 31 41 kg/m²          Physical Exam   Constitutional: He is oriented to person, place, and time  He appears well-developed and well-nourished  No distress  HENT:   Head: Normocephalic and atraumatic  Cardiovascular: Normal rate, regular rhythm and normal heart sounds  No murmur heard  Pulmonary/Chest: Effort normal and breath sounds normal  He has no wheezes  Abdominal: Soft  There is no tenderness  Musculoskeletal: Normal range of motion  He exhibits no edema  Ambulates with cane due to lower back pain/injury   Neurological: He is alert and oriented to person, place, and time  Skin: Skin is warm and dry  No rash noted  Psychiatric: He has a normal mood and affect   His behavior is normal

## 2018-10-11 NOTE — ASSESSMENT & PLAN NOTE
Patient was previously being prescribed zolpidem through pain management  Refilled prescription  Will work to taper patient off sleep aid, especially in light of possible sleep apnea  Sleep study ordered  Counseled patient on smoking cessation and healthy sleep habits

## 2018-10-11 NOTE — ASSESSMENT & PLAN NOTE
Counseled patient on dietary modifications and specific diet recommendations that will help to best control GERD symptoms  These practices would include limiting or eliminating caffeinated foods and beverages including chocolate, coffee and soda  Also limiting the intake of spicy foods or anything else that exacerbate symptoms  Will restart patient on PPI blocker  Advised nothing by mouth at least 2-3 hours prior to lying down for bed  Advise placing the head of the bed on riser is to assist in gravity keeping stomach acid down

## 2018-10-11 NOTE — ASSESSMENT & PLAN NOTE
Previously affiliated with pain management, however was privately owned and doctor retired  Refilled narcotic pain medications as were being prescribed by previous pain management physician after reviewing medical records

## 2018-10-11 NOTE — ASSESSMENT & PLAN NOTE
Lab Results   Component Value Date    HGBA1C 8 0 (H) 12/01/2017       No results for input(s): POCGLU in the last 72 hours      Blood Sugar Average: Last 72 hrs:     Patient has been off all diabetic medications because he had no insurance and was unable to see doctor/get medications  Last HbA1c as listed above, will order another, but highly suspect it will be uncontrolled due to medication noncompliance/inability to get medications  Patient previously on Novolog 70/30 HS, will start on Lantus 20U HS because patient doesn't require short acting insulin at this time until better idea of long acting effectiveness is established  Restarted patient back on metformin  Counseled extensively on diet and exercise  Advised and educated on home glucose testing and logs, new monitor sent to pharmacy with supplies

## 2018-10-11 NOTE — ASSESSMENT & PLAN NOTE
Many risk factors for sleep apnea as outlined in sleep study referral  Counseled on smoking cessation

## 2018-10-11 NOTE — ASSESSMENT & PLAN NOTE
Counseled patient on smoking cessation  Educated on quitting hotline and other available resources  Patient acknowledges risk of continued tobacco use

## 2018-10-11 NOTE — ASSESSMENT & PLAN NOTE
Patient currently uncontrolled and off all medications due to lack of insurance coverage  Counseled on diet and exercise, particularly consumption of low sodium and processed foods  Educated on home blood pressure logs  Advised smoking cessation  Restarted previous regimen of cozaar and lopressor

## 2018-10-12 ENCOUNTER — PATIENT OUTREACH (OUTPATIENT)
Dept: FAMILY MEDICINE CLINIC | Facility: CLINIC | Age: 58
End: 2018-10-12

## 2018-10-12 NOTE — PROGRESS NOTES
Call to pt in regards to his diabetic meds/glucometer  Spoke to pts wife Francoise Chavarria since she states pt is sleeping  Wife states they use [facility] on [address] [facility] [address]  Call to [facility] and spoke to Mol and she states they had his name wrong from his Medicare card  Shazia Galvez pts name as it is on Medicare card and she states all his medication went through  Pts lantus is $8, pts test strips are $3 30 and pts glucometer is free  Wife states they will go to [place] today to  the test strips and glucometer  Wife states they already picked up the lantus

## 2018-10-12 NOTE — Clinical Note
This referral was covered when I was out of the office by a covering   If you have any questions, or additional support is needed, please let me know

## 2018-10-23 ENCOUNTER — TELEPHONE (OUTPATIENT)
Dept: FAMILY MEDICINE CLINIC | Facility: CLINIC | Age: 58
End: 2018-10-23

## 2018-10-23 DIAGNOSIS — E11.65 TYPE 2 DIABETES MELLITUS WITH HYPERGLYCEMIA, WITH LONG-TERM CURRENT USE OF INSULIN (HCC): Primary | ICD-10-CM

## 2018-10-23 DIAGNOSIS — Z79.4 TYPE 2 DIABETES MELLITUS WITH HYPERGLYCEMIA, WITH LONG-TERM CURRENT USE OF INSULIN (HCC): Primary | ICD-10-CM

## 2018-10-23 NOTE — TELEPHONE ENCOUNTER
[facility] pharmacy states if Dr Juancarlos Johnson can cancel the order Smart View test strips  Please send over Accu Check Alvira test strips those are the correct strips for the machine pt has

## 2018-10-26 ENCOUNTER — TELEPHONE (OUTPATIENT)
Dept: FAMILY MEDICINE CLINIC | Facility: CLINIC | Age: 58
End: 2018-10-26

## 2018-10-26 NOTE — TELEPHONE ENCOUNTER
Patient last saw Dr Senait Sol on 10/08/2018 and was told he will referr pt to pain el[facility]  Please place an order/referral in for pt

## 2018-11-01 ENCOUNTER — OFFICE VISIT (OUTPATIENT)
Dept: FAMILY MEDICINE CLINIC | Facility: CLINIC | Age: 58
End: 2018-11-01
Payer: COMMERCIAL

## 2018-11-01 ENCOUNTER — TELEPHONE (OUTPATIENT)
Dept: FAMILY MEDICINE CLINIC | Facility: CLINIC | Age: 58
End: 2018-11-01

## 2018-11-01 VITALS
DIASTOLIC BLOOD PRESSURE: 102 MMHG | OXYGEN SATURATION: 97 % | SYSTOLIC BLOOD PRESSURE: 160 MMHG | HEART RATE: 87 BPM | TEMPERATURE: 97.7 F | RESPIRATION RATE: 16 BRPM | BODY MASS INDEX: 31.76 KG/M2 | WEIGHT: 185 LBS

## 2018-11-01 DIAGNOSIS — G89.29 CHRONIC MIDLINE LOW BACK PAIN WITHOUT SCIATICA: ICD-10-CM

## 2018-11-01 DIAGNOSIS — F41.1 GENERALIZED ANXIETY DISORDER: ICD-10-CM

## 2018-11-01 DIAGNOSIS — Z79.4 TYPE 2 DIABETES MELLITUS WITH HYPERGLYCEMIA, WITH LONG-TERM CURRENT USE OF INSULIN (HCC): Primary | ICD-10-CM

## 2018-11-01 DIAGNOSIS — E78.5 HYPERLIPIDEMIA, UNSPECIFIED HYPERLIPIDEMIA TYPE: ICD-10-CM

## 2018-11-01 DIAGNOSIS — I48.0 PAROXYSMAL A-FIB (HCC): ICD-10-CM

## 2018-11-01 DIAGNOSIS — I10 ESSENTIAL HYPERTENSION: ICD-10-CM

## 2018-11-01 DIAGNOSIS — F51.01 PRIMARY INSOMNIA: ICD-10-CM

## 2018-11-01 DIAGNOSIS — G89.29 CHRONIC LEFT SHOULDER PAIN: ICD-10-CM

## 2018-11-01 DIAGNOSIS — M25.512 CHRONIC LEFT SHOULDER PAIN: ICD-10-CM

## 2018-11-01 DIAGNOSIS — E11.65 TYPE 2 DIABETES MELLITUS WITH HYPERGLYCEMIA, WITH LONG-TERM CURRENT USE OF INSULIN (HCC): Primary | ICD-10-CM

## 2018-11-01 DIAGNOSIS — K21.9 GASTROESOPHAGEAL REFLUX DISEASE WITHOUT ESOPHAGITIS: ICD-10-CM

## 2018-11-01 DIAGNOSIS — Z72.0 TOBACCO ABUSE: ICD-10-CM

## 2018-11-01 DIAGNOSIS — M54.50 CHRONIC MIDLINE LOW BACK PAIN WITHOUT SCIATICA: ICD-10-CM

## 2018-11-01 PROCEDURE — 99214 OFFICE O/P EST MOD 30 MIN: CPT | Performed by: FAMILY MEDICINE

## 2018-11-01 PROCEDURE — 4010F ACE/ARB THERAPY RXD/TAKEN: CPT | Performed by: INTERNAL MEDICINE

## 2018-11-01 RX ORDER — SIMVASTATIN 40 MG
40 TABLET ORAL
Qty: 90 TABLET | Refills: 0 | Status: SHIPPED | OUTPATIENT
Start: 2018-11-01 | End: 2019-02-07 | Stop reason: SDUPTHER

## 2018-11-01 RX ORDER — BUPROPION HYDROCHLORIDE 150 MG/1
150 TABLET, EXTENDED RELEASE ORAL 2 TIMES DAILY
Qty: 120 TABLET | Refills: 0 | Status: SHIPPED | OUTPATIENT
Start: 2018-11-01 | End: 2019-02-07 | Stop reason: SDUPTHER

## 2018-11-01 RX ORDER — PAROXETINE 10 MG/1
10 TABLET, FILM COATED ORAL DAILY
Qty: 30 TABLET | Refills: 1 | Status: SHIPPED | OUTPATIENT
Start: 2018-11-01 | End: 2019-02-07

## 2018-11-01 RX ORDER — AMLODIPINE BESYLATE 5 MG/1
5 TABLET ORAL DAILY
Qty: 30 TABLET | Refills: 1 | Status: SHIPPED | OUTPATIENT
Start: 2018-11-01 | End: 2019-02-07

## 2018-11-01 RX ORDER — RANITIDINE 150 MG/1
150 CAPSULE ORAL 2 TIMES DAILY
Qty: 180 CAPSULE | Refills: 0 | Status: SHIPPED | OUTPATIENT
Start: 2018-11-01 | End: 2019-02-07 | Stop reason: SDUPTHER

## 2018-11-01 RX ORDER — LOSARTAN POTASSIUM 100 MG/1
100 TABLET ORAL DAILY
Qty: 90 TABLET | Refills: 0 | Status: SHIPPED | OUTPATIENT
Start: 2018-11-01 | End: 2019-02-07

## 2018-11-01 NOTE — ASSESSMENT & PLAN NOTE
Previously seeing pain management  Was suppose to be sent to pain management at previous, but referral wasn't placed  New referral placed to pain management  All records from previous pain management scanned into chart to be forwarded to new pain management doctors

## 2018-11-01 NOTE — ASSESSMENT & PLAN NOTE
Patient uncontrolled on current regimen  Counseled on diet and exercise, particularly consumption of low sodium and processed foods  Educated on home blood pressure logs  Advised smoking cessation  Continue current medication regimen with metoprolol and cozaar  Started on amlodipine  BP check in 1 month

## 2018-11-01 NOTE — ASSESSMENT & PLAN NOTE
Discussed anxiety extensively with patient with Dr Radha Mcallister present in the room  Patient has been suffering for years with anxiety and actually started smoking at age 47 due to uncontrolled stress and anxiety in his life  Scheduled to see Dr Radha Mcallister on 11/6/18  Started on Paxil at half his previous dose of 20mg to 10mg as patient will be starting on Zyban at the same time  I did discuss the risks and benefits or starting these medications together and patient and wife verbalize understanding and would like to proceed

## 2018-11-01 NOTE — ASSESSMENT & PLAN NOTE
Lab Results   Component Value Date    HGBA1C 8 0 (H) 12/01/2017       No results for input(s): POCGLU in the last 72 hours      Blood Sugar Average: Last 72 hrs:     Did not get labs as ordered at last appointment  Brought glucose log in starting from 10/15 to today, fasting morning sugars have been in the high 100's and into the 200's about 50% of the time  Evening sugars have been in the mid to high 100's with only 2 readings in the 200's  Advised to continue blood glucose testing; strips called into pharmacy, but prior authorization is required for Nia strips which appears to be the glucometer that the pharmacy dispensed; will send communication to [place] to see if she is able to resolve the issue with meter/strip compatibility  Increased Lantus from 20 to 25U QHS  If evening sugars reduce and are consistently in the low to mid 100's, patient may not require any meal time coverage  Continue metformin 1000mg BID  Patient is uncontrolled  Counseled extensively on diet and exercise  Referral to podiatry

## 2018-11-01 NOTE — ASSESSMENT & PLAN NOTE
It is very unclear exactly what the patient has from the past as he was on Eliquis at one point but was provided some form of written documentation (possibly from ED?) requesting to stop medication as they thought it might just be palpitations related to anxiety  I would advise that patient be placed on a monitor/loop recorder/even monitor to determine if there is an underlying cardiac etiology  Patient started on Paxil and scheduled with behavioral health to assist in anxiety management and further assess for despression

## 2018-11-01 NOTE — PATIENT INSTRUCTIONS
Fumar cigarrillos y moran bailey   CUIDADO AMBULATORIO:   Los riesgos para moran bailey si usted fuma:  La nicotina y otros químicos que se encuentran en el tabaco dañan todas las células del cuerpo  Aunque fume poco o sea un fumador social, el riesgo de [address] , enfermedad del corazón y enfermedad de los pulmones [place]  Si usted está embarazada o tiene diabetes, el fumar aumenta moran riesgo de sufrir complicaciones  Los beneficios para moran bailey si usted luciana de fumar:   · Disminuyen los síntomas respiratorios suzanne tos, sibilancias y dificultad para respirar  · Usted reduce el riesgo de cáncer de pulmón, bucal, de la garganta, riñón, vejiga, páncreas, estómago y cervix  Si usted ya tiene cáncer, al no fumar aumenta los beneficios de la quimioterapia  También reduce moran riesgo de que el cáncer regrese o que desarrolle un carleen cáncer  · Usted reduce moran riesgo de [place] enfermedad cardíaca, coágulos sanguíneos, ataque cardíaco y un derrame cerebral      · Usted reduce moran riesgo de presentar infecciones y [employer], suzanne la neumonía, el asma, la bronquitis crónica y [place]  · Moran circulación mejora  Al permitir que [employer] suministro de oxígeno a moran cuerpo  Si usted tiene diabetes, disminuye moran riesgo de complicaciones, suzanne enfermedades del [place], de las arterias y de los ojos  Usted también disminuye moran riesgo de daño a los nervios  El daño a los nervios puede conllevar a [place]enton amputación, tonia vista y ceguera  · La capacidad de moran cuerpo para sanar y combatir infecciones mejora  Los beneficios para la bailey de las otras personas si usted luciana de fumar:  El tabaco es perjudicial para los no fumadores que respiran el humo de forma pasiva  Kodi Bains son maneras en que puede mejorar la bailey de las personas que lo rodean cuando usted luciana de fumar:  · Usted disminuye el riesgo de cáncer en los pulmones y de enfermedades del corazón en los adultos que no fuman       · Si usted está Berl Stuart snider riesgo de sufrir un aborto espontáneo, un parto antes de [place], un bebé de bajo peso al nacer y de muerte fetal  También disminuye el riesgo del bebé de sufrir el síndrome de muerte súbita del lactante (SMIS, o muerte en la cuna), obesidad, atrasos en el desarrollo y problemas neuroconductuales, suzanne el trastorno por déficit de atención e hiperactividad [facility])  · Si usted tiene niños, disminuye el riesgo de que jessica niños contraigan infecciones, resfriados, neumonía, bronquitis y asma  Judy Poncho y [de-identified] para dejar de fumar:   · Smokefree  gov  Phone: 7- 843 - 061-1177  Web Address: www [employer]  Acuda a jessica consultas de control con snider médico según le indicaron  Anote jessica preguntas para que se acuerde de hacerlas yudelka jessica visitas  © 2017 [address] Information is for End User's use only and may not be sold, redistributed or otherwise used for commercial purposes  All illustrations and images included in CareNotes® are the copyrighted property of A D A M , Inc  or Abisai Pike  Esta información es sólo para uso en educación  Snider intención no es darle un consejo médico sobre enfermedades o tratamientos  Colsulte con snider Vane Revering farmacéutico antes de seguir cualquier régimen médico para saber si es seguro y efectivo para usted

## 2018-11-01 NOTE — PROGRESS NOTES
Assessment/Plan:    Gastroesophageal reflux disease without esophagitis  Counseled patient on dietary modifications and specific diet recommendations that will help to best control GERD symptoms  These practices would include limiting or eliminating caffeinated foods and beverages including chocolate, coffee and soda  Also limiting the intake of spicy foods or anything else that exacerbate symptoms  Will continue patient on H2 blocker  Advised nothing by mouth at least 2-3 hours prior to lying down for bed  Advise placing the head of the bed on riser is to assist in gravity keeping stomach acid down  Counseled on the effect that tobacco use has on his continued reflux    Type 2 diabetes mellitus with hyperglycemia (HCC)  Lab Results   Component Value Date    HGBA1C 8 0 (H) 12/01/2017       No results for input(s): POCGLU in the last 72 hours      Blood Sugar Average: Last 72 hrs:     Did not get labs as ordered at last appointment  Brought glucose log in starting from 10/15 to today, fasting morning sugars have been in the high 100's and into the 200's about 50% of the time  Evening sugars have been in the mid to high 100's with only 2 readings in the 200's  Advised to continue blood glucose testing; strips called into pharmacy, but prior authorization is required for Nia strips which appears to be the glucometer that the pharmacy dispensed; will send communication to Porter to see if she is able to resolve the issue with meter/strip compatibility  Increased Lantus from 20 to 25U QHS  If evening sugars reduce and are consistently in the low to mid 100's, patient may not require any meal time coverage  Continue metformin 1000mg BID  Patient is uncontrolled  Counseled extensively on diet and exercise  Referral to podiatry        Hypertension  Patient uncontrolled on current regimen  Counseled on diet and exercise, particularly consumption of low sodium and processed foods  Educated on home blood pressure logs  Advised smoking cessation  Continue current medication regimen with metoprolol and cozaar  Started on amlodipine  BP check in 1 month      Paroxysmal a-fib (HCC)  It is very unclear exactly what the patient has from the past as he was on Eliquis at one point but was provided some form of written documentation (possibly from ED?) requesting to stop medication as they thought it might just be palpitations related to anxiety  I would advise that patient be placed on a monitor/loop recorder/even monitor to determine if there is an underlying cardiac etiology  Patient started on Paxil and scheduled with behavioral health to assist in anxiety management and further assess for despression      Tobacco abuse  Counseled patient on smoking cessation  Smoking approximately 1ppd, started smoking at age 47 due to life stressors  Started on Zyban and clearly outlined initiation protocol with patient and wife  Educated on quitting hotline and other available resources  Patient acknowledges risk of continued tobacco use      Primary insomnia  Sleep study ordered, patient has not heard anything yet regarding scheduling for sleep study  Continue zolpidem at this time until sleep study is completed  Counseled on smoking cessation, weight loss and healthy sleep habits    Chronic left shoulder pain  Previously seeing pain management  Was suppose to be sent to pain management at previous, but referral wasn't placed  New referral placed to pain management  All records from previous pain management scanned into chart to be forwarded to new pain management doctors      Chronic midline low back pain without sciatica  As above under shoulder pain  Secondary to work related injury in 2015    Generalized anxiety disorder  Discussed anxiety extensively with patient with Dr Velasquez Donald present in the room  Patient has been suffering for years with anxiety and actually started smoking at age 47 due to uncontrolled stress and anxiety in his life  Scheduled to see Dr Sp Bradshaw on 11/6/18  Started on Paxil at half his previous dose of 20mg to 10mg as patient will be starting on Zyban at the same time  I did discuss the risks and benefits or starting these medications together and patient and wife verbalize understanding and would like to proceed       Diagnoses and all orders for this visit:    Type 2 diabetes mellitus with hyperglycemia, with long-term current use of insulin (HCC)  -     insulin glargine (LANTUS SOLOSTAR) 100 units/mL injection pen; Inject 25 Units under the skin daily at bedtime  -     Ambulatory referral to Podiatry; Future  -     metFORMIN (GLUCOPHAGE) 1000 MG tablet; Take 1 tablet (1,000 mg total) by mouth 2 (two) times a day with meals    Essential hypertension  -     amLODIPine (NORVASC) 5 mg tablet; Take 1 tablet (5 mg total) by mouth daily  -     losartan (COZAAR) 100 MG tablet; Take 1 tablet (100 mg total) by mouth daily  -     metoprolol tartrate (LOPRESSOR) 25 mg tablet; Take 1 tablet (25 mg total) by mouth 2 (two) times a day    Tobacco abuse  -     buPROPion (ZYBAN) 150 MG 12 hr tablet; Take 1 tablet (150 mg total) by mouth 2 (two) times a day Start once daily for 3 days then increase to BID    Generalized anxiety disorder  -     PARoxetine (PAXIL) 10 mg tablet; Take 1 tablet (10 mg total) by mouth daily    Chronic left shoulder pain  -     Ambulatory referral to Pain Management; Future    Chronic midline low back pain without sciatica  -     Ambulatory referral to Pain Management; Future    Paroxysmal A-fib [facility])  -     Ambulatory referral to Cardiology; Future    Gastroesophageal reflux disease without esophagitis  -     ranitidine (ZANTAC) 150 MG capsule; Take 1 capsule (150 mg total) by mouth 2 (two) times a day    Primary insomnia    Hyperlipidemia, unspecified hyperlipidemia type  -     simvastatin (ZOCOR) 40 mg tablet;  Take 1 tablet (40 mg total) by mouth daily at bedtime          Subjective:      Patient ID: [place] Dolores Serrano is a 65 y o  male  This is a 64 yo M who comes into the clinic today for an extended visit to discuss his multiple comorbidities in depth and discuss his understanding of his health, behaviors and barriers  The patient states that, since being started back on his medication, be reports feeling better and states that his blood pressure and diabetes are doing much better on medication  He continues to have his standard amount of pain in his shoulder and back for which he is taking his medication as previously prescribed by pain management, now being prescribed by our office  He reports intermittent palpitations which he feels may be related to anxiety, but the exact etiology is unclear as discussed throughout the visit  He also remarks on pain on the "outside" of his foot secondary to the way he stands and walks due to chronic back and hip pain as well as L leg injury several years ago with repair  The following portions of the patient's history were reviewed and updated as appropriate: allergies, current medications, past family history, past medical history, past social history, past surgical history and problem list     Review of Systems   Constitutional: Negative for activity change, chills, fatigue and fever  HENT: Negative for congestion, sinus pain, sinus pressure and sore throat  Respiratory: Positive for shortness of breath (intermittent, usually with palpitations)  Negative for cough, chest tightness and wheezing  Cardiovascular: Positive for palpitations  Negative for chest pain  Gastrointestinal: Negative for abdominal pain, constipation, diarrhea, nausea and vomiting  Genitourinary: Negative for difficulty urinating, dysuria, frequency and hematuria  Musculoskeletal: Positive for arthralgias, back pain, gait problem and myalgias  Skin: Negative for rash  Allergic/Immunologic: Negative for environmental allergies  Neurological: Positive for tremors   Negative for dizziness, seizures, light-headedness and headaches  Psychiatric/Behavioral: Negative for confusion and hallucinations  Objective:      BP (!) 160/102 (BP Location: Left arm, Patient Position: Sitting, Cuff Size: Adult)   Pulse 87   Temp 97 7 °F (36 5 °C) (Temporal)   Resp 16   Wt 83 9 kg (185 lb)   SpO2 97%   BMI 31 76 kg/m²          Physical Exam   Constitutional: He is oriented to person, place, and time  He appears well-developed and well-nourished  HENT:   Head: Normocephalic and atraumatic  Cardiovascular: Normal rate and regular rhythm  No murmur heard  Pulmonary/Chest: Effort normal and breath sounds normal  No respiratory distress  He has no wheezes  Musculoskeletal: He exhibits tenderness  He exhibits no edema or deformity  Right ankle: Achilles tendon exhibits no pain  Left ankle: Achilles tendon exhibits no pain  Feet:    Neurological: He is alert and oriented to person, place, and time  Skin: Skin is warm and dry  No rash noted  Psychiatric: He has a normal mood and affect   His behavior is normal  Judgment and thought content normal

## 2018-11-01 NOTE — ASSESSMENT & PLAN NOTE
Counseled patient on dietary modifications and specific diet recommendations that will help to best control GERD symptoms  These practices would include limiting or eliminating caffeinated foods and beverages including chocolate, coffee and soda  Also limiting the intake of spicy foods or anything else that exacerbate symptoms  Will continue patient on H2 blocker  Advised nothing by mouth at least 2-3 hours prior to lying down for bed  Advise placing the head of the bed on riser is to assist in gravity keeping stomach acid down  Counseled on the effect that tobacco use has on his continued reflux

## 2018-11-01 NOTE — ASSESSMENT & PLAN NOTE
Sleep study ordered, patient has not heard anything yet regarding scheduling for sleep study  Continue zolpidem at this time until sleep study is completed  Counseled on smoking cessation, weight loss and healthy sleep habits

## 2018-11-01 NOTE — ASSESSMENT & PLAN NOTE
Counseled patient on smoking cessation  Smoking approximately 1ppd, started smoking at age 47 due to life stressors  Started on Zyban and clearly outlined initiation protocol with patient and wife  Educated on quitting hotline and other available resources  Patient acknowledges risk of continued tobacco use

## 2018-11-02 ENCOUNTER — TELEPHONE (OUTPATIENT)
Dept: FAMILY MEDICINE CLINIC | Facility: CLINIC | Age: 58
End: 2018-11-02

## 2018-11-02 NOTE — TELEPHONE ENCOUNTER
Patient states he saw Dr Jac Khanna and was told the doctor will send refills on   Oxycodone and Zolpidem but the pharmacy didn't receive refills

## 2018-11-05 ENCOUNTER — TELEPHONE (OUTPATIENT)
Dept: FAMILY MEDICINE CLINIC | Facility: CLINIC | Age: 58
End: 2018-11-05

## 2018-11-05 DIAGNOSIS — M54.50 CHRONIC MIDLINE LOW BACK PAIN WITHOUT SCIATICA: ICD-10-CM

## 2018-11-05 DIAGNOSIS — F51.01 PRIMARY INSOMNIA: ICD-10-CM

## 2018-11-05 DIAGNOSIS — G89.29 CHRONIC MIDLINE LOW BACK PAIN WITHOUT SCIATICA: ICD-10-CM

## 2018-11-05 DIAGNOSIS — G89.29 CHRONIC LEFT SHOULDER PAIN: ICD-10-CM

## 2018-11-05 DIAGNOSIS — M25.512 CHRONIC LEFT SHOULDER PAIN: ICD-10-CM

## 2018-11-05 RX ORDER — ZOLPIDEM TARTRATE 10 MG/1
10 TABLET ORAL
Qty: 30 TABLET | Refills: 0 | Status: SHIPPED | OUTPATIENT
Start: 2018-11-05 | End: 2018-11-05 | Stop reason: SDUPTHER

## 2018-11-05 RX ORDER — ZOLPIDEM TARTRATE 10 MG/1
10 TABLET ORAL
Qty: 30 TABLET | Refills: 0 | Status: SHIPPED | OUTPATIENT
Start: 2018-11-05 | End: 2018-11-29

## 2018-11-05 RX ORDER — OXYCODONE HYDROCHLORIDE 30 MG/1
30 TABLET ORAL EVERY 6 HOURS PRN
Qty: 120 TABLET | Refills: 0 | Status: SHIPPED | OUTPATIENT
Start: 2018-11-05 | End: 2018-11-29

## 2018-11-05 RX ORDER — OXYCODONE HYDROCHLORIDE 30 MG/1
30 TABLET ORAL EVERY 6 HOURS PRN
Qty: 120 TABLET | Refills: 0 | Status: SHIPPED | OUTPATIENT
Start: 2018-11-05 | End: 2018-11-05 | Stop reason: SDUPTHER

## 2018-11-05 NOTE — TELEPHONE ENCOUNTER
PT came in asking for med refills for oxycodone amd zolpidem  I looked into his med list and both are end of date as of today  11/5/2018    Is pt aware of med being discont?

## 2018-11-06 ENCOUNTER — OFFICE VISIT (OUTPATIENT)
Dept: FAMILY MEDICINE CLINIC | Facility: CLINIC | Age: 58
End: 2018-11-06

## 2018-11-06 DIAGNOSIS — F41.9 ANXIETY DISORDER, UNSPECIFIED TYPE: Primary | ICD-10-CM

## 2018-11-07 ENCOUNTER — DOCUMENTATION (OUTPATIENT)
Dept: LAB | Facility: CLINIC | Age: 58
End: 2018-11-07

## 2018-11-07 ENCOUNTER — PATIENT OUTREACH (OUTPATIENT)
Dept: FAMILY MEDICINE CLINIC | Facility: CLINIC | Age: 58
End: 2018-11-07

## 2018-11-07 NOTE — PROGRESS NOTES
I met with Bladimir Granger and his wife yesterday  Bladimir Granger has a history of depression and anxiety  He has received medication for his conditions before  However, due to losing his insurance, he has stopped his medication and therapy  Gulshan affect during the session was depressed  He did not speak much either  Wife expressed concerns about her 's depressive state, and commented that his depression seem to be getting progressively worse  Gulshan denied any suicidal or homicidal ideations  He expressed a desire to connect with a psychiatrist and to receive outpatient therapy services  I reached out to our outpatient care manager  Grace Reis was able to speak with Bladimir Granger about connecting them to psychiatric and outpatient services  He needs a Stateless speaking therapist  We agreed that Serena Reis will follow up with them  I also offered to meet with them in two weeks on 11/20/18 for a final follow up

## 2018-11-08 NOTE — PROGRESS NOTES
SILVINA HOYOS attempted to obtain [facility] appt for pt as per Dr Cm COOL CM tried [employer] at [facility] request   [employer] states that [employer] is inactive, but they will take pt once his insurance is active again  SILVINA HOYOS sent Dr Cm Alfaro a staff message encouraging her to reach out to [employer] regarding [employer], as this is under her scope of practice  SILVINA HOYOS is available for additional support as needed via consult

## 2018-11-12 ENCOUNTER — PATIENT OUTREACH (OUTPATIENT)
Dept: FAMILY MEDICINE CLINIC | Facility: CLINIC | Age: 58
End: 2018-11-12

## 2018-11-12 NOTE — PROGRESS NOTES
SILVINA HOYOS spoke with  and explained that SILVINA  is trying to connect pt with [facility] for a [address] intake in order to titrate from Dr Dinah Gosselin caseload  According to [facility], pt's insurance is coming up as inactive   states that Dr Lala Galindo did make her aware of the need to check pt's insurance status  SILVINA HOYOS to wait for [facility] to advise  SILVINA HOYOS cannot get an appt for pt MH until insurance is active  SILVINA HOYOS is available for additional support as needed

## 2018-11-21 ENCOUNTER — TELEPHONE (OUTPATIENT)
Dept: FAMILY MEDICINE CLINIC | Facility: CLINIC | Age: 58
End: 2018-11-21

## 2018-11-21 ENCOUNTER — PATIENT OUTREACH (OUTPATIENT)
Dept: FAMILY MEDICINE CLINIC | Facility: CLINIC | Age: 58
End: 2018-11-21

## 2018-11-21 NOTE — PROGRESS NOTES
SILVINA HOYOS called [facility] an spoke with Jefry Reddy, Intake  [facility] [place] states that she was running [facility] [facility] as MA and not Medicare  [facility] confirms that pt's Medicare as active  Pt is now on the wait list for [facility], which is about a 6-8 week wait  Will inform Dr More Miller as pt is Nepali speaking only  If pt does not hear from [facility] in 6-8 weeks, pt needs to follow up with [facility] directly at [phone], as per intake  SILVINA HOYOS will close this referral at this time  SILVINA HOYOS is available for additional support as needed via consult

## 2018-11-21 NOTE — TELEPHONE ENCOUNTER
I called and spoke with his wife and she stated that the pt does have insurance and doesn't understand why it's showing inactive  She just received the new card with us as the pt pcp  I advised her to call member services and ask if he is covered for mental health, there is always the possibility that he may not have coverage, if that is the case we will have to see how we can help

## 2018-11-21 NOTE — TELEPHONE ENCOUNTER
----- Message from Suzanne Carbajal, PhD sent at 11/7/2018  3:42 PM EST -----  Regarding: : [facility] Appt  Vania:   Can you work with this patient to help him with insurance? Thank you!      ----- Message -----  From: Alpheus Schwab, MSW  Sent: 11/7/2018   2:37 PM  To: Suzanne Carbajal, PhD  Subject: [facility] [address],    [facility] has a Yoruba speaking therapist willing to see this patient  However, [facility] says his insurance is coming up as inactive  Cannot get an appt for him without this being active  Can you let the pt know and maybe refer to [facility]? Thanks

## 2018-11-29 ENCOUNTER — OFFICE VISIT (OUTPATIENT)
Dept: CARDIOLOGY CLINIC | Facility: CLINIC | Age: 58
End: 2018-11-29
Payer: COMMERCIAL

## 2018-11-29 VITALS
DIASTOLIC BLOOD PRESSURE: 86 MMHG | BODY MASS INDEX: 31.18 KG/M2 | WEIGHT: 182.6 LBS | HEIGHT: 64 IN | HEART RATE: 66 BPM | SYSTOLIC BLOOD PRESSURE: 158 MMHG

## 2018-11-29 DIAGNOSIS — I10 ESSENTIAL HYPERTENSION: Primary | ICD-10-CM

## 2018-11-29 DIAGNOSIS — I48.0 PAROXYSMAL A-FIB (HCC): ICD-10-CM

## 2018-11-29 PROCEDURE — 93000 ELECTROCARDIOGRAM COMPLETE: CPT | Performed by: INTERNAL MEDICINE

## 2018-11-29 PROCEDURE — 99204 OFFICE O/P NEW MOD 45 MIN: CPT | Performed by: INTERNAL MEDICINE

## 2018-11-29 RX ORDER — ASPIRIN 81 MG/1
81 TABLET ORAL DAILY
COMMUNITY
End: 2019-02-07

## 2018-11-29 NOTE — PROGRESS NOTES
Electrophysiology-Cardiology (EP)   Chito Gould 65 y o  male MRN: [id number]  Unit/Bed#:  Encounter: [id number]        IMPRESSION:  1  Paroxysmal afib  Documented on admission 2016  Still has palpitations every other day  Short of breath  [id number] (DM, HTN) and used to be on Eliquis but has not been receiving it anymore  Never told to stop it for any specific reason  He is on metoprolol  Normal Echol    2  HTN well controlled, bordeline high coleen  3  Witness apnea by wife and loud snoring  Never had RHIANNA screening  4  Chronic tobacco dependence    PLAN:  1  Recommend resume ELiquis and stop aspirin  We checked w insurance and gave samples to cover him until Jan1st when insurance will cover it  2  48 hour holter    3  Sleep study    F/u  3months              Referring Physian: Dr Tanner Kaplan for afib  Chief Complain/Reason for Referal:   Reina Lackey is a 65 y o  Patient Active Problem List    Diagnosis Date Noted    Generalized anxiety disorder 11/01/2018     Priority: Low    Gastroesophageal reflux disease without esophagitis 10/11/2018     Priority: Low    Primary insomnia 10/08/2018     Priority: Low    Chronic left shoulder pain 10/08/2018     Priority: Low    Chronic midline low back pain without sciatica 10/08/2018     Priority: Low    Snoring 10/08/2018     Priority: Low    Chronic nausea 09/30/2016     Priority: Low    Paroxysmal A-fib ([address] ) 09/28/2016     Priority: Low    Hypertension      Priority: Low    Hyperlipidemia      Priority: Low    Type 2 diabetes mellitus with hyperglycemia (HCC)      Priority: Low    Tobacco abuse      Priority: Low     HPI    66 yo male  1) Afib, paroxysmal dx 2016 during admission for shortness of breath  Started on ELiquis and metoprolol  Echo was normal  He was hypertensive during admission  Since then still has palpitations on some days and not others  He is short of breath w exertion, fatigued   Wife states he doesn't breath well during sleep and snores  He stopped Eliquis on his own because rx ran out and wasn't refilled  Past Medical History:   Diagnosis Date    Diabetes mellitus ([address] )     Hyperlipidemia     Hypertension     Tobacco abuse          (Not in a hospital admission)    Scheduled Meds:  Continuous Infusions:  No current facility-administered medications for this visit  PRN Meds:  No Known Allergies  I reviewed the Home Medication list in the chart  No family history on file  Social History     Social History    Marital status: /Civil Union     Spouse name: N/A    Number of children: N/A    Years of education: N/A     Occupational History    Not on file  Social History Main Topics    Smoking status: Current Every Day Smoker     Packs/day: 1 00     Types: Cigarettes    Smokeless tobacco: Never Used    Alcohol use No    Drug use: No    Sexual activity: Not on file     Other Topics Concern    Not on file     Social History Narrative    No narrative on file       Review of Systems -12 Point ROS reviewed and are negative or noted in chart except for Pertinent Positives Pertaining to Cardiovascular and Respiratory in HPI above  Vitals:    11/29/18 1305   BP: 158/86   Pulse: 66     Vitals:    11/29/18 1305   Weight: 82 8 kg (182 lb 9 6 oz)   [unfilled]      GEN: Now acute distress, Alert and oriented, well appearing  HEENT:Head, neck, ears, oral pharynx: Mucus membranes moist, oral pharynx clear, nares clear  External ears normal  EYES: Pupils equal, sclera anicteric, midline, normal conjuctiva  NECK: No JVD, supple, no obvious masses or thryomegaly or goiter  CARDIOVASCULAR: RRR, No murmur, rub, gallops S1,S2  LUNGS: Clear To auscultation bilaterally, normal effort, no rales, rhonchi, crackles  ABDOMEN: Soft, nondistended, nontender, without obvious organomegaly or ascites  EXTREMITIES/VASCULAR: No edema   Radial pulses intact, pedal pulses difficult to palpate, warm an well perfused  PSYCH: Normal Affect, no overt suicidal ideation, linear speech pattern without evidence of psychosis  NEURO: Grossly intact, moving all extremiteis equal, face symmetric, alert and responsive, no obvious focal defecits  HEME: No bleeding, bruising, petechia, purpura  SKIN: No significant rashes, warm, no diaphoresis or pallor  Lab Results:     CBC with diff:       Invalid input(s):  RBC, TOTALCELLSCOUNTED, SEGS%, GRANS%, LYMPHS%, EOS%, BASO%, ABNEUT, ABGRANS, ABLYMPHS, ABMOMOS, ABEOS, ABBASO      CMP:      Invalid input(s): ALBUMIN      BMP:      Invalid input(s): LABGLOM    BNP:   Results Reviewed     None        No results for input(s): BNP in the last 72 hours  Magnesium:       Coags:       TSH:       Lipid Profile:         Cardiac testing:   Results for orders placed during the hospital encounter of 16   Echo complete with contrast if indicated    Narrative [address] [place] [address], [address] [facility]  [phone]    Transthoracic Echocardiogram  2D, M-mode, Doppler, and Color Doppler    Study date:  29-Sep-2016    Patient: Kim Sadler  MR number: [id number]  Account number: [de-identified]  : 1960  Age: 48 years  Gender: Male  Status: Inpatient  Location: Bedside  Height: 64 in  Weight: 187 lb  BP: 180/ 96 mmHg    Indications: Atrial fibrillation    Diagnoses: I48 0 - Atrial fibrillation    Sonographer:  MOSHE Mccullough  Referring Physician:  Angel PA-C  Group:  [facility]  Interpreting Physician:  Elisabeth Rob DO    SUMMARY    LEFT VENTRICLE:  Systolic function was normal  Ejection fraction was estimated in the range of  55 % to 60 %  There were no regional wall motion abnormalities  Wall thickness was mildly increased  There was mild concentric hypertrophy  MITRAL VALVE:  There was mild regurgitation  AORTIC VALVE:  There was trace regurgitation      HISTORY: PRIOR HISTORY: Hypertension, hyperlipidemia, DM, tobacco abuse    PROCEDURE: The procedure was performed at the bedside  This was a routine  study  The transthoracic approach was used  The study included complete 2D  imaging, M-mode, complete spectral Doppler, and color Doppler  The heart rate  was 76 bpm, at the start of the study  Images were obtained from the  parasternal, apical, subcostal, and suprasternal notch acoustic windows  Image  quality was adequate  LEFT VENTRICLE: Size was normal  Systolic function was normal  Ejection  fraction was estimated in the range of 55 % to 60 %  There were no regional  wall motion abnormalities  Wall thickness was mildly increased  There was mild  concentric hypertrophy  DOPPLER: Left ventricular diastolic function parameters  were normal     RIGHT VENTRICLE: The size was normal  Systolic function was normal  Wall  thickness was normal     LEFT ATRIUM: Size was normal     RIGHT ATRIUM: Size was normal     MITRAL VALVE: Valve structure was normal  There was normal leaflet separation  DOPPLER: The transmitral velocity was within the normal range  There was no  evidence for stenosis  There was mild regurgitation  AORTIC VALVE: The valve was trileaflet  Leaflets exhibited normal thickness and  normal cuspal separation  DOPPLER: Transaortic velocity was within the normal  range  There was no evidence for stenosis  There was trace regurgitation  TRICUSPID VALVE: The valve structure was normal  There was normal leaflet  separation  DOPPLER: The transtricuspid velocity was within the normal range  There was no evidence for stenosis  There was no regurgitation  PULMONIC VALVE: Leaflets exhibited normal thickness, no calcification, and  normal cuspal separation  DOPPLER: The transpulmonic velocity was within the  normal range  There was no regurgitation  PERICARDIUM: There was no pericardial effusion  AORTA: The root exhibited normal size      SYSTEMIC VEINS: IVC: The inferior vena cava was normal in size and course  Respirophasic changes were normal     PULMONARY ARTERY: DOPPLER: The tricuspid jet envelope definition was inadequate  for estimation of RV systolic pressure  SYSTEM MEASUREMENT TABLES    2D  %FS: 32 %  Ao Diam: 3 5 cm  EDV(Teich): 99 5 ml  EF Biplane: 44 9 %  EF(Teich): 60 2 %  ESV(Teich): 39 6 ml  IVSd: 1 cm  LA Area: 19 1 cm2  LA Diam: 3 2 cm  LVEDV MOD A2C: 111 6 ml  LVEDV MOD A4C: 135 4 ml  LVEDV MOD BP: 129 3 ml  LVEF MOD A2C: 39 %  LVEF MOD A4C: 47 2 %  LVESV MOD A2C: 68 ml  LVESV MOD A4C: 71 5 ml  LVESV MOD BP: 71 3 ml  LVIDd: 4 6 cm  LVIDs: 3 2 cm  LVLd A2C: 8 4 cm  LVLd A4C: 9 4 cm  LVLs A2C: 7 7 cm  LVLs A4C: 7 3 cm  LVPWd: 1 cm  RA Area: 11 2 cm2  RVIDd: 3 cm  SV MOD A2C: 43 5 ml  SV MOD A4C: 63 9 ml  SV(Teich): 59 9 ml    CW  AR Dec [place]: 2 3 m/s2  AR Dec Time: 1633 1 ms  AR PHT: 473 6 ms  AR Vmax: 3 8 m/s  AR maxP 5 mmHg    MM  TAPSE: 2 2 cm    PW  E': 0 1 m/s  E/E': 10 2  MV A Francois: 0 7 m/s  MV Dec [place]: 5 1 m/s2  MV DecT: 160 6 ms  MV E Francois: 0 8 m/s  MV E/A Ratio: 1 3  MV PHT: 46 6 ms  MVA By PHT: 4 7 cm2    [employer] Accredited Echocardiography Laboratory    Prepared and electronically signed by    Reza Martin DO  Signed 29-Sep-2016 15:25:44       No results found for this or any previous visit  No results found for this or any previous visit  No results found for this or any previous visit  EKG/TELE: NSR 66bp nonspecific st changes      I have personally reviewed the EKG, Echocardiogram, CXR and Telemetry images directly and the above is my interpretation

## 2018-12-03 ENCOUNTER — TELEPHONE (OUTPATIENT)
Dept: SLEEP CENTER | Facility: CLINIC | Age: 58
End: 2018-12-03

## 2018-12-03 NOTE — TELEPHONE ENCOUNTER
----- Message from Jamie zAul DO sent at 11/30/2018  4:50 PM EST -----  Chart reviewed  Study approved   ----- Message -----  From: Barbara Dobbs  Sent: 11/30/2018   8:43 AM  To: Jamie Azul, DO    This sleep study needs approval      If approved please sign and return to clerical pool  If denied please include reasons why  Also provide alternative testing if warranted  Please sign and return to clerical pool

## 2018-12-05 ENCOUNTER — DOCUMENTATION (OUTPATIENT)
Dept: LAB | Facility: CLINIC | Age: 58
End: 2018-12-05

## 2018-12-05 ENCOUNTER — HOSPITAL ENCOUNTER (OUTPATIENT)
Dept: NON INVASIVE DIAGNOSTICS | Facility: CLINIC | Age: 58
Discharge: HOME | End: 2018-12-05
Payer: COMMERCIAL

## 2018-12-05 DIAGNOSIS — I48.0 PAROXYSMAL A-FIB (HCC): ICD-10-CM

## 2018-12-05 PROCEDURE — 93226 XTRNL ECG REC<48 HR SCAN A/R: CPT

## 2018-12-05 PROCEDURE — 93225 XTRNL ECG REC<48 HRS REC: CPT

## 2018-12-05 NOTE — PROGRESS NOTES
I called Lee Hong and his wife  I spoke with the wife regarding a missed appointment  She explained to me that they had to go to the cardiologist that is why they missed the appointment  I also explained that they can  Call [facility] and schedule an appointment with them  I am closing this case right now as they will follow with [facility]

## 2018-12-11 PROCEDURE — 93227 XTRNL ECG REC<48 HR R&I: CPT | Performed by: INTERNAL MEDICINE

## 2018-12-12 ENCOUNTER — TELEPHONE (OUTPATIENT)
Dept: CARDIOLOGY CLINIC | Facility: CLINIC | Age: 58
End: 2018-12-12

## 2018-12-12 NOTE — TELEPHONE ENCOUNTER
----- Message from Guerrero Rey MD sent at 12/11/2018  6:25 PM EST -----  Please let him know holter was normal  thanks

## 2018-12-21 ENCOUNTER — TELEPHONE (OUTPATIENT)
Dept: PAIN MEDICINE | Facility: MEDICAL CENTER | Age: 58
End: 2018-12-21

## 2018-12-21 NOTE — TELEPHONE ENCOUNTER
Tried to contact patient, could not leave message in regards to appointment  Do not schedule  Per Dr Samia Cook

## 2019-01-07 DIAGNOSIS — E11.9 DM2 (DIABETES MELLITUS, TYPE 2) (HCC): ICD-10-CM

## 2019-01-10 DIAGNOSIS — Z79.4 TYPE 2 DIABETES MELLITUS WITH HYPERGLYCEMIA, WITH LONG-TERM CURRENT USE OF INSULIN (HCC): ICD-10-CM

## 2019-01-10 DIAGNOSIS — E11.65 TYPE 2 DIABETES MELLITUS WITH HYPERGLYCEMIA, WITH LONG-TERM CURRENT USE OF INSULIN (HCC): ICD-10-CM

## 2019-01-10 RX ORDER — PEN NEEDLE, DIABETIC 31 GX5/16"
NEEDLE, DISPOSABLE MISCELLANEOUS
Qty: 100 EACH | Refills: 0 | OUTPATIENT
Start: 2019-01-10

## 2019-02-07 ENCOUNTER — OFFICE VISIT (OUTPATIENT)
Dept: FAMILY MEDICINE CLINIC | Facility: CLINIC | Age: 59
End: 2019-02-07

## 2019-02-07 VITALS
OXYGEN SATURATION: 98 % | BODY MASS INDEX: 32.27 KG/M2 | HEART RATE: 85 BPM | DIASTOLIC BLOOD PRESSURE: 100 MMHG | TEMPERATURE: 97.3 F | SYSTOLIC BLOOD PRESSURE: 176 MMHG | WEIGHT: 188 LBS | RESPIRATION RATE: 16 BRPM

## 2019-02-07 DIAGNOSIS — I10 ESSENTIAL HYPERTENSION: Primary | ICD-10-CM

## 2019-02-07 DIAGNOSIS — F51.01 PRIMARY INSOMNIA: ICD-10-CM

## 2019-02-07 DIAGNOSIS — K21.9 GASTROESOPHAGEAL REFLUX DISEASE WITHOUT ESOPHAGITIS: ICD-10-CM

## 2019-02-07 DIAGNOSIS — G89.29 CHRONIC LEFT SHOULDER PAIN: ICD-10-CM

## 2019-02-07 DIAGNOSIS — I48.0 PAROXYSMAL A-FIB (HCC): ICD-10-CM

## 2019-02-07 DIAGNOSIS — M25.512 CHRONIC LEFT SHOULDER PAIN: ICD-10-CM

## 2019-02-07 DIAGNOSIS — E11.65 TYPE 2 DIABETES MELLITUS WITH HYPERGLYCEMIA, WITH LONG-TERM CURRENT USE OF INSULIN (HCC): ICD-10-CM

## 2019-02-07 DIAGNOSIS — E78.5 HYPERLIPIDEMIA, UNSPECIFIED HYPERLIPIDEMIA TYPE: ICD-10-CM

## 2019-02-07 DIAGNOSIS — Z79.4 TYPE 2 DIABETES MELLITUS WITH HYPERGLYCEMIA, WITH LONG-TERM CURRENT USE OF INSULIN (HCC): ICD-10-CM

## 2019-02-07 DIAGNOSIS — F41.1 GENERALIZED ANXIETY DISORDER: ICD-10-CM

## 2019-02-07 DIAGNOSIS — Z72.0 TOBACCO ABUSE: ICD-10-CM

## 2019-02-07 PROCEDURE — 99213 OFFICE O/P EST LOW 20 MIN: CPT | Performed by: FAMILY MEDICINE

## 2019-02-07 RX ORDER — PAROXETINE HYDROCHLORIDE 20 MG/1
20 TABLET, FILM COATED ORAL DAILY
Qty: 30 TABLET | Refills: 3 | Status: SHIPPED | OUTPATIENT
Start: 2019-02-07 | End: 2019-08-12 | Stop reason: SDUPTHER

## 2019-02-07 RX ORDER — LOSARTAN POTASSIUM AND HYDROCHLOROTHIAZIDE 12.5; 1 MG/1; MG/1
1 TABLET ORAL DAILY
Qty: 30 TABLET | Refills: 1 | Status: SHIPPED | OUTPATIENT
Start: 2019-02-07 | End: 2019-05-09 | Stop reason: SDUPTHER

## 2019-02-07 RX ORDER — AMLODIPINE BESYLATE 10 MG/1
10 TABLET ORAL DAILY
Qty: 90 TABLET | Refills: 1 | Status: SHIPPED | OUTPATIENT
Start: 2019-02-07 | End: 2019-04-25

## 2019-02-07 RX ORDER — SIMVASTATIN 40 MG
40 TABLET ORAL
Qty: 90 TABLET | Refills: 0 | Status: SHIPPED | OUTPATIENT
Start: 2019-02-07 | End: 2019-09-16 | Stop reason: SDUPTHER

## 2019-02-07 RX ORDER — OXYCODONE HYDROCHLORIDE 30 MG/1
30 TABLET ORAL EVERY 4 HOURS PRN
Qty: 30 TABLET | Refills: 0
Start: 2019-02-07 | End: 2019-04-25

## 2019-02-07 RX ORDER — RANITIDINE 150 MG/1
150 CAPSULE ORAL 2 TIMES DAILY
Qty: 180 CAPSULE | Refills: 0 | Status: SHIPPED | OUTPATIENT
Start: 2019-02-07 | End: 2019-08-12 | Stop reason: SDUPTHER

## 2019-02-07 RX ORDER — ZOLPIDEM TARTRATE 5 MG/1
5 TABLET ORAL
Qty: 30 TABLET | Refills: 0
Start: 2019-02-07 | End: 2019-04-25

## 2019-02-07 RX ORDER — BUPROPION HYDROCHLORIDE 150 MG/1
150 TABLET, EXTENDED RELEASE ORAL 2 TIMES DAILY
Qty: 120 TABLET | Refills: 0 | Status: SHIPPED | OUTPATIENT
Start: 2019-02-07 | End: 2019-04-25

## 2019-02-07 RX ORDER — LANCETS
EACH MISCELLANEOUS 3 TIMES DAILY
Qty: 100 EACH | Refills: 2 | Status: SHIPPED | OUTPATIENT
Start: 2019-02-07 | End: 2019-04-25

## 2019-02-07 NOTE — PROGRESS NOTES
Assessment/Plan:    Gastroesophageal reflux disease without esophagitis  Counseled patient on dietary modifications and specific diet recommendations that will help to best control GERD symptoms  These practices would include limiting or eliminating caffeinated foods and beverages including chocolate, coffee and soda  Also limiting the intake of spicy foods or anything else that exacerbate symptoms  Will continue patient on H blocker  Advised nothing by mouth at least 2-3 hours prior to lying down for bed  Advise placing the head of the bed on riser is to assist in gravity keeping stomach acid down      Type 2 diabetes mellitus with hyperglycemia ([facility] )  Lab Results   Component Value Date    HGBA1C 8 0 (H) 12/01/2017       No results for input(s): POCGLU in the last 72 hours  Blood Sugar Average: Last 72 hrs:     Did not get labs as orders at the previous visit because of his sisters stroke  I counseled the patient extensively that not getting his labs will result in similar situation for him due to his uncontrolled hypertension and potentially uncontrolled diabetes which we have been unable to track  Patient did not bring in a glucose log today  Patient is uncontrolled  Counseled extensively on diet and exercise  Advised and educated on home glucose testing and logs  Continue current p o  regimen of metformin 1000 mg b i d  Continue current insulin regimen of Lantus 25 units q h s        Hypertension  Patient is potentially controlled on his current regimen, however he is not taking it this morning making it very difficult to ascertain as he has control  No blood pressure logs were provided in office today  Counseled on diet and exercise, particularly consumption of low sodium and processed foods  Educated on home blood pressure logs  Advised smoking cessation  Continue current medication regimen with Norvasc 10 mg daily, losartan-hydrochlorothiazide 100-12 5 mg daily    I advised the patient that since waking up at 11:00 a m , there is no excuse for having not taken his blood pressure medications at this point as he is in for a to o'clock appointment  Patient verbalizes understanding of his risks associated with high blood pressure and extensive family history of stroke  Paroxysmal a-fib [facility])  Patient was placed on a Holter monitor with normal results  Currently affiliated Cardiology  Advised to continue Eliquis 5 mg b i d  Hyperlipidemia  Patient counseled on diet and exercise  Advised to continue statin medication      Tobacco abuse  Counseled patient on smoking cessation  Reports cutting down since last appointment and states that he is down to approximately 1/2 pack daily  Patient will continue to use Zyban 150 mg b i d   Educated on quitting hotline and other available resources  Patient acknowledges risk of continued tobacco use      Primary insomnia  Patient is currently prescribed 5 mg of zolpidem daily  Despite this prescription, patient still not sleeping at night  Counseled patient extensively on healthy sleep hygiene which he is currently not compliant with as he is sleeping during the mornings into the afternoon and then not tired at night  Patient verbalizes understanding that this is a change that he needs to make and it likely is not a change rooted in and sleep issues    Chronic left shoulder pain  Patient is now every affiliated with pain management, Dr Amira Mix and [address] [place] [address]  He is prescribed oxycodone 30 mg q 4 hours p r n   For pain  Patient was previously on [place] which has been discontinued at this point      Generalized anxiety disorder  Patient currently seeing psych at [facility], has only seen counselors up to this point and is scheduled to see Psychiatry next month  At patient's request, his Paxil was increased from 10 mg to 20 mg daily  I advised patient that Psychiatry might not want him on Paxil for and this medication may change once he sees the psychiatrist at [facility] Diagnoses and all orders for this visit:    Essential hypertension  -     losartan-hydrochlorothiazide (HYZAAR) 100-12 5 MG per tablet; Take 1 tablet by mouth daily  -     amLODIPine (NORVASC) 10 mg tablet; Take 1 tablet (10 mg total) by mouth daily  -     metoprolol tartrate (LOPRESSOR) 25 mg tablet; Take 1 tablet (25 mg total) by mouth 2 (two) times a day    Paroxysmal A-fib (HCC)    Generalized anxiety disorder  -     PARoxetine (PAXIL) 20 mg tablet; Take 1 tablet (20 mg total) by mouth daily    Tobacco abuse  -     buPROPion (ZYBAN) 150 MG 12 hr tablet; Take 1 tablet (150 mg total) by mouth 2 (two) times a day Start once daily for 3 days then increase to BID    Primary insomnia  -     zolpidem (AMBIEN) 5 mg tablet; Take 1 tablet (5 mg total) by mouth daily at bedtime as needed for sleep    Type 2 diabetes mellitus with hyperglycemia, with long-term current use of insulin ([address] )  -     ACCU-CHEK FASTCLIX LANCETS MISC; by Other route 3 (three) times a day  -     Insulin Pen Needle (B-D ULTRAFINE III SHORT PEN) 31G X 8 MM MISC; Inject into the skin 4 (four) times a day  -     insulin glargine (LANTUS SOLOSTAR) 100 units/mL injection pen; Inject 25 Units under the skin daily at bedtime  -     metFORMIN (GLUCOPHAGE) 1000 MG tablet; Take 1 tablet (1,000 mg total) by mouth 2 (two) times a day with meals    Chronic left shoulder pain  -     oxyCODONE (ROXICODONE) 30 MG immediate release tablet; Take 1 tablet (30 mg total) by mouth every 4 (four) hours as needed for moderate pain Max Daily Amount: 180 mg    Hyperlipidemia, unspecified hyperlipidemia type  -     simvastatin (ZOCOR) 40 mg tablet; Take 1 tablet (40 mg total) by mouth daily at bedtime    Gastroesophageal reflux disease without esophagitis  -     ranitidine (ZANTAC) 150 MG capsule; Take 1 capsule (150 mg total) by mouth 2 (two) times a day          Subjective:      Patient ID: Roosevelt Apgar is a 63 y o  male      This is a 61-year-old male who comes into the office today for follow-up of his chronic conditions  The patient reports issues with sleeping at night  Patient woke up at 11 o'clock this morning having fallen asleep at approximately 6 o'clock in the morning  The patient is prescribed 5 mg of Ambien from his pain management doctor  He is currently seen Dr Nitin Grubbs for pain management in [facility]  Dr Nitin Grubbs has prescribed him oxycodone 30 mg q 4 hours p r n  For his pain  He was previously referred to Dr Jose Juan Pate however in reviewing his chart, Dr Tarik Finley and has determined that there is nothing he can do to assist patient with his back pain  The patient reports that he has not taken any of his blood pressure medications today which is likely the reason why his blood pressure is still elevated in the office  Patient also mentions that his sister recently had a stroke resulting in blindness of her right eye  This upset causing him a large amount of stress  Patient was previously seen Dr Jose R Vo for psych related issues, however he is now fully agreed with [facility]  He has not yet seen a psychiatrist yet, only counselors  The patient is requesting to have his Paxil increased from 10 mg to 20 mg as he used to be on  Patient denies any chest pain or shortness of breath in the office today  He denies any headaches or visual disturbances  He continues to have his chronic back pain and shoulder pain from previous injury  The following portions of the patient's history were reviewed and updated as appropriate: allergies, current medications, past family history, past medical history, past social history, past surgical history and problem list     Review of Systems   Constitutional: Negative for activity change, chills, fatigue and fever  HENT: Negative for congestion, sinus pain, sinus pressure and sore throat  Respiratory: Negative for cough, chest tightness, shortness of breath and wheezing      Cardiovascular: Negative for chest pain and palpitations  Gastrointestinal: Negative for abdominal pain, constipation, diarrhea, nausea and vomiting  Genitourinary: Negative for difficulty urinating, dysuria, frequency and hematuria  Musculoskeletal: Positive for arthralgias, back pain and gait problem  Negative for neck pain  Skin: Negative for rash  Allergic/Immunologic: Negative for environmental allergies  Neurological: Negative for light-headedness and headaches  Psychiatric/Behavioral: Negative for confusion and hallucinations  Objective:      BP (!) 176/100 (BP Location: Left arm, Patient Position: Sitting, Cuff Size: Adult)   Pulse 85   Temp (!) 97 3 °F (36 3 °C) (Temporal)   Resp 16   Wt 85 3 kg (188 lb)   SpO2 98%   BMI 32 27 kg/m²          Physical Exam   Constitutional: He is oriented to person, place, and time  He appears well-developed and well-nourished  No distress  HENT:   Head: Normocephalic and atraumatic  Cardiovascular: Normal rate, regular rhythm and normal heart sounds  Pulses are no weak pulses  No murmur heard  Pulses:       Dorsalis pedis pulses are 2+ on the right side, and 2+ on the left side  Pulmonary/Chest: Effort normal and breath sounds normal  He has no wheezes  Abdominal: Soft  There is no tenderness  Musculoskeletal: Normal range of motion  He exhibits no edema  Feet:   Right Foot:   Skin Integrity: Negative for ulcer, skin breakdown, erythema, warmth, callus or dry skin  Left Foot:   Skin Integrity: Negative for ulcer, skin breakdown, erythema, warmth, callus or dry skin  Neurological: He is alert and oriented to person, place, and time  Skin: Skin is warm and dry  No rash noted  Psychiatric: He has a normal mood and affect  His behavior is normal    Nursing note and vitals reviewed  Patient's shoes and socks removed  Right Foot/Ankle   Right Foot Inspection  Skin Exam: skin normal and skin intact no dry skin, no warmth, no callus, no erythema, no maceration, no abnormal color, no pre-ulcer, no ulcer and no callus                          Toe Exam: ROM and strength within normal limits  Sensory       Monofilament testing: intact  Vascular  Capillary refills: < 3 seconds  The right DP pulse is 2+  Left Foot/Ankle  Left Foot Inspection  Skin Exam: skin normal and skin intactno dry skin, no warmth, no erythema, no maceration, normal color, no pre-ulcer, no ulcer and no callus                         Toe Exam: ROM and strength within normal limits                   Sensory       Monofilament: intact  Vascular  Capillary refills: < 3 seconds  The left DP pulse is 2+  Assign Risk Category:  No deformity present; No loss of protective sensation;  No weak pulses       Risk: 0

## 2019-02-08 NOTE — ASSESSMENT & PLAN NOTE
Patient was placed on a Holter monitor with normal results  Currently affiliated Cardiology  Advised to continue Eliquis 5 mg b i d

## 2019-02-08 NOTE — ASSESSMENT & PLAN NOTE
Patient is potentially controlled on his current regimen, however he is not taking it this morning making it very difficult to ascertain as he has control  No blood pressure logs were provided in office today  Counseled on diet and exercise, particularly consumption of low sodium and processed foods  Educated on home blood pressure logs  Advised smoking cessation  Continue current medication regimen with Norvasc 10 mg daily, losartan-hydrochlorothiazide 100-12 5 mg daily    I advised the patient that since waking up at 11:00 a m , there is no excuse for having not taken his blood pressure medications at this point as he is in for a to o'clock appointment  Patient verbalizes understanding of his risks associated with high blood pressure and extensive family history of stroke

## 2019-02-08 NOTE — ASSESSMENT & PLAN NOTE
Patient is now every affiliated with pain management, Dr Omid Wallace  He is prescribed oxycodone 30 mg q 4 hours p r n   For pain  Patient was previously on [place] which has been discontinued at this point

## 2019-02-08 NOTE — ASSESSMENT & PLAN NOTE
Lab Results   Component Value Date    HGBA1C 8 0 (H) 12/01/2017       No results for input(s): POCGLU in the last 72 hours  Blood Sugar Average: Last 72 hrs:     Did not get labs as orders at the previous visit because of his sisters stroke  I counseled the patient extensively that not getting his labs will result in similar situation for him due to his uncontrolled hypertension and potentially uncontrolled diabetes which we have been unable to track  Patient did not bring in a glucose log today  Patient is uncontrolled  Counseled extensively on diet and exercise  Advised and educated on home glucose testing and logs  Continue current p o  regimen of metformin 1000 mg b i d  Continue current insulin regimen of Lantus 25 units q h s

## 2019-02-08 NOTE — ASSESSMENT & PLAN NOTE
Counseled patient on smoking cessation  Reports cutting down since last appointment and states that he is down to approximately 1/2 pack daily  Patient will continue to use Zyban 150 mg b i d   Educated on quitting hotline and other available resources  Patient acknowledges risk of continued tobacco use

## 2019-02-08 NOTE — ASSESSMENT & PLAN NOTE
Patient is currently prescribed 5 mg of zolpidem daily  Despite this prescription, patient still not sleeping at night  Counseled patient extensively on healthy sleep hygiene which he is currently not compliant with as he is sleeping during the mornings into the afternoon and then not tired at night  Patient verbalizes understanding that this is a change that he needs to make and it likely is not a change rooted in and sleep issues

## 2019-02-08 NOTE — ASSESSMENT & PLAN NOTE
Counseled patient on dietary modifications and specific diet recommendations that will help to best control GERD symptoms  These practices would include limiting or eliminating caffeinated foods and beverages including chocolate, coffee and soda  Also limiting the intake of spicy foods or anything else that exacerbate symptoms  Will continue patient on H blocker  Advised nothing by mouth at least 2-3 hours prior to lying down for bed  Advise placing the head of the bed on riser is to assist in gravity keeping stomach acid down

## 2019-02-08 NOTE — ASSESSMENT & PLAN NOTE
Patient currently seeing psych at [facility], has only seen counselors up to this point and is scheduled to see Psychiatry next month  At patient's request, his Paxil was increased from 10 mg to 20 mg daily  I advised patient that Psychiatry might not want him on Paxil for and this medication may change once he sees the psychiatrist at [facility]

## 2019-04-25 ENCOUNTER — HOSPITAL ENCOUNTER (OUTPATIENT)
Facility: HOSPITAL | Age: 59
Setting detail: OBSERVATION
Discharge: HOME | End: 2019-04-26
Attending: EMERGENCY MEDICINE | Admitting: INTERNAL MEDICINE
Payer: COMMERCIAL

## 2019-04-25 ENCOUNTER — APPOINTMENT (EMERGENCY)
Dept: RADIOLOGY | Facility: HOSPITAL | Age: 59
End: 2019-04-25
Payer: COMMERCIAL

## 2019-04-25 DIAGNOSIS — E11.9 DIABETES (HCC): ICD-10-CM

## 2019-04-25 DIAGNOSIS — Z72.0 TOBACCO ABUSE: ICD-10-CM

## 2019-04-25 DIAGNOSIS — E78.00 HYPERCHOLESTEREMIA: ICD-10-CM

## 2019-04-25 DIAGNOSIS — F41.9 ANXIETY: ICD-10-CM

## 2019-04-25 DIAGNOSIS — I10 UNCONTROLLED HYPERTENSION: ICD-10-CM

## 2019-04-25 DIAGNOSIS — R07.9 CHEST PAIN, RULE OUT ACUTE MYOCARDIAL INFARCTION: Primary | ICD-10-CM

## 2019-04-25 LAB
ALBUMIN SERPL BCP-MCNC: 4.5 G/DL (ref 3.5–5)
ALP SERPL-CCNC: 83 U/L (ref 46–116)
ALT SERPL W P-5'-P-CCNC: 26 U/L (ref 12–78)
ANION GAP SERPL CALCULATED.3IONS-SCNC: 8 MMOL/L (ref 4–13)
AST SERPL W P-5'-P-CCNC: 16 U/L (ref 5–45)
BASOPHILS # BLD AUTO: 0.06 THOUSANDS/ΜL (ref 0–0.1)
BASOPHILS NFR BLD AUTO: 1 % (ref 0–1)
BILIRUB SERPL-MCNC: 0.45 MG/DL (ref 0.2–1)
BUN SERPL-MCNC: 15 MG/DL (ref 5–25)
CALCIUM SERPL-MCNC: 9.8 MG/DL (ref 8.3–10.1)
CHLORIDE SERPL-SCNC: 100 MMOL/L (ref 100–108)
CO2 SERPL-SCNC: 30 MMOL/L (ref 21–32)
CREAT SERPL-MCNC: 1.06 MG/DL (ref 0.6–1.3)
EOSINOPHIL # BLD AUTO: 0.22 THOUSAND/ΜL (ref 0–0.61)
EOSINOPHIL NFR BLD AUTO: 3 % (ref 0–6)
ERYTHROCYTE [DISTWIDTH] IN BLOOD BY AUTOMATED COUNT: 13.2 % (ref 11.6–15.1)
EST. AVERAGE GLUCOSE BLD GHB EST-MCNC: 174 MG/DL
GFR SERPL CREATININE-BSD FRML MDRD: 78 ML/MIN/1.73SQ M
GLUCOSE SERPL-MCNC: 116 MG/DL (ref 65–140)
GLUCOSE SERPL-MCNC: 173 MG/DL (ref 65–140)
GLUCOSE SERPL-MCNC: 175 MG/DL (ref 65–140)
HBA1C MFR BLD: 7.7 % (ref 4.2–6.3)
HCT VFR BLD AUTO: 45 % (ref 36.5–49.3)
HGB BLD-MCNC: 14.7 G/DL (ref 12–17)
IMM GRANULOCYTES # BLD AUTO: 0.02 THOUSAND/UL (ref 0–0.2)
IMM GRANULOCYTES NFR BLD AUTO: 0 % (ref 0–2)
LYMPHOCYTES # BLD AUTO: 2.42 THOUSANDS/ΜL (ref 0.6–4.47)
LYMPHOCYTES NFR BLD AUTO: 30 % (ref 14–44)
MCH RBC QN AUTO: 30.3 PG (ref 26.8–34.3)
MCHC RBC AUTO-ENTMCNC: 32.7 G/DL (ref 31.4–37.4)
MCV RBC AUTO: 93 FL (ref 82–98)
MONOCYTES # BLD AUTO: 0.69 THOUSAND/ΜL (ref 0.17–1.22)
MONOCYTES NFR BLD AUTO: 9 % (ref 4–12)
NEUTROPHILS # BLD AUTO: 4.7 THOUSANDS/ΜL (ref 1.85–7.62)
NEUTS SEG NFR BLD AUTO: 57 % (ref 43–75)
NRBC BLD AUTO-RTO: 0 /100 WBCS
PLATELET # BLD AUTO: 247 THOUSANDS/UL (ref 149–390)
PMV BLD AUTO: 10.1 FL (ref 8.9–12.7)
POTASSIUM SERPL-SCNC: 3.3 MMOL/L (ref 3.5–5.3)
PROT SERPL-MCNC: 8.7 G/DL (ref 6.4–8.2)
RBC # BLD AUTO: 4.85 MILLION/UL (ref 3.88–5.62)
SODIUM SERPL-SCNC: 138 MMOL/L (ref 136–145)
TROPONIN I SERPL-MCNC: <0.02 NG/ML
WBC # BLD AUTO: 8.11 THOUSAND/UL (ref 4.31–10.16)

## 2019-04-25 PROCEDURE — 99285 EMERGENCY DEPT VISIT HI MDM: CPT

## 2019-04-25 PROCEDURE — 71046 X-RAY EXAM CHEST 2 VIEWS: CPT

## 2019-04-25 PROCEDURE — 36415 COLL VENOUS BLD VENIPUNCTURE: CPT

## 2019-04-25 PROCEDURE — 84484 ASSAY OF TROPONIN QUANT: CPT | Performed by: INTERNAL MEDICINE

## 2019-04-25 PROCEDURE — 84484 ASSAY OF TROPONIN QUANT: CPT | Performed by: EMERGENCY MEDICINE

## 2019-04-25 PROCEDURE — 80053 COMPREHEN METABOLIC PANEL: CPT | Performed by: EMERGENCY MEDICINE

## 2019-04-25 PROCEDURE — 83036 HEMOGLOBIN GLYCOSYLATED A1C: CPT | Performed by: INTERNAL MEDICINE

## 2019-04-25 PROCEDURE — 99220 PR INITIAL OBSERVATION CARE/DAY 70 MINUTES: CPT | Performed by: INTERNAL MEDICINE

## 2019-04-25 PROCEDURE — 85025 COMPLETE CBC W/AUTO DIFF WBC: CPT | Performed by: EMERGENCY MEDICINE

## 2019-04-25 PROCEDURE — 82948 REAGENT STRIP/BLOOD GLUCOSE: CPT

## 2019-04-25 PROCEDURE — 80074 ACUTE HEPATITIS PANEL: CPT | Performed by: INTERNAL MEDICINE

## 2019-04-25 PROCEDURE — 93005 ELECTROCARDIOGRAM TRACING: CPT

## 2019-04-25 PROCEDURE — 99285 EMERGENCY DEPT VISIT HI MDM: CPT | Performed by: EMERGENCY MEDICINE

## 2019-04-25 RX ORDER — NITROGLYCERIN 0.4 MG/1
0.4 TABLET SUBLINGUAL
Status: DISCONTINUED | OUTPATIENT
Start: 2019-04-25 | End: 2019-04-26 | Stop reason: HOSPADM

## 2019-04-25 RX ORDER — INSULIN GLARGINE 100 [IU]/ML
20 INJECTION, SOLUTION SUBCUTANEOUS
Status: DISCONTINUED | OUTPATIENT
Start: 2019-04-25 | End: 2019-04-26 | Stop reason: HOSPADM

## 2019-04-25 RX ORDER — ACETAMINOPHEN 325 MG/1
650 TABLET ORAL ONCE AS NEEDED
Status: COMPLETED | OUTPATIENT
Start: 2019-04-25 | End: 2019-04-25

## 2019-04-25 RX ORDER — ACETAMINOPHEN 325 MG/1
650 TABLET ORAL EVERY 4 HOURS PRN
Status: DISCONTINUED | OUTPATIENT
Start: 2019-04-25 | End: 2019-04-26 | Stop reason: HOSPADM

## 2019-04-25 RX ORDER — HYDRALAZINE HYDROCHLORIDE 20 MG/ML
5 INJECTION INTRAMUSCULAR; INTRAVENOUS EVERY 6 HOURS PRN
Status: DISCONTINUED | OUTPATIENT
Start: 2019-04-25 | End: 2019-04-26 | Stop reason: HOSPADM

## 2019-04-25 RX ORDER — ZOLPIDEM TARTRATE 10 MG/1
10 TABLET ORAL
COMMUNITY

## 2019-04-25 RX ORDER — FAMOTIDINE 20 MG/1
20 TABLET, FILM COATED ORAL
Status: DISCONTINUED | OUTPATIENT
Start: 2019-04-25 | End: 2019-04-26 | Stop reason: HOSPADM

## 2019-04-25 RX ORDER — NITROGLYCERIN 0.4 MG/1
0.4 TABLET SUBLINGUAL
Status: COMPLETED | OUTPATIENT
Start: 2019-04-25 | End: 2019-04-25

## 2019-04-25 RX ORDER — ONDANSETRON 2 MG/ML
4 INJECTION INTRAMUSCULAR; INTRAVENOUS EVERY 6 HOURS PRN
Status: DISCONTINUED | OUTPATIENT
Start: 2019-04-25 | End: 2019-04-26 | Stop reason: HOSPADM

## 2019-04-25 RX ORDER — ALPRAZOLAM 0.25 MG/1
0.25 TABLET ORAL ONCE
Status: COMPLETED | OUTPATIENT
Start: 2019-04-25 | End: 2019-04-25

## 2019-04-25 RX ORDER — ASPIRIN 81 MG/1
324 TABLET, CHEWABLE ORAL ONCE
Status: COMPLETED | OUTPATIENT
Start: 2019-04-25 | End: 2019-04-25

## 2019-04-25 RX ORDER — POTASSIUM CHLORIDE 20 MEQ/1
20 TABLET, EXTENDED RELEASE ORAL ONCE
Status: COMPLETED | OUTPATIENT
Start: 2019-04-25 | End: 2019-04-25

## 2019-04-25 RX ORDER — PAROXETINE HYDROCHLORIDE 20 MG/1
20 TABLET, FILM COATED ORAL DAILY
Status: DISCONTINUED | OUTPATIENT
Start: 2019-04-25 | End: 2019-04-26 | Stop reason: HOSPADM

## 2019-04-25 RX ORDER — PRAVASTATIN SODIUM 80 MG/1
80 TABLET ORAL
Status: DISCONTINUED | OUTPATIENT
Start: 2019-04-25 | End: 2019-04-26 | Stop reason: HOSPADM

## 2019-04-25 RX ORDER — NICOTINE 21 MG/24HR
1 PATCH, TRANSDERMAL 24 HOURS TRANSDERMAL DAILY
Status: DISCONTINUED | OUTPATIENT
Start: 2019-04-25 | End: 2019-04-26 | Stop reason: HOSPADM

## 2019-04-25 RX ADMIN — ACETAMINOPHEN 650 MG: 325 TABLET ORAL at 12:35

## 2019-04-25 RX ADMIN — NITROGLYCERIN 0.4 MG: 0.4 TABLET SUBLINGUAL at 14:41

## 2019-04-25 RX ADMIN — NITROGLYCERIN 0.4 MG: 0.4 TABLET SUBLINGUAL at 13:00

## 2019-04-25 RX ADMIN — PRAVASTATIN SODIUM 80 MG: 80 TABLET ORAL at 18:13

## 2019-04-25 RX ADMIN — FAMOTIDINE 20 MG: 20 TABLET ORAL at 18:13

## 2019-04-25 RX ADMIN — ALPRAZOLAM 0.25 MG: 0.25 TABLET ORAL at 18:13

## 2019-04-25 RX ADMIN — NITROGLYCERIN 0.4 MG: 0.4 TABLET SUBLINGUAL at 14:31

## 2019-04-25 RX ADMIN — NITROGLYCERIN 0.4 MG: 0.4 TABLET SUBLINGUAL at 14:36

## 2019-04-25 RX ADMIN — INSULIN LISPRO 1 UNITS: 100 INJECTION, SOLUTION INTRAVENOUS; SUBCUTANEOUS at 17:28

## 2019-04-25 RX ADMIN — APIXABAN 5 MG: 5 TABLET, FILM COATED ORAL at 18:13

## 2019-04-25 RX ADMIN — NITROGLYCERIN 0.4 MG: 0.4 TABLET SUBLINGUAL at 12:37

## 2019-04-25 RX ADMIN — HYDRALAZINE HYDROCHLORIDE 5 MG: 20 INJECTION INTRAMUSCULAR; INTRAVENOUS at 23:55

## 2019-04-25 RX ADMIN — ASPIRIN 81 MG 324 MG: 81 TABLET ORAL at 12:35

## 2019-04-25 RX ADMIN — POTASSIUM CHLORIDE 20 MEQ: 1500 TABLET, EXTENDED RELEASE ORAL at 14:32

## 2019-04-25 RX ADMIN — INSULIN GLARGINE 20 UNITS: 100 INJECTION, SOLUTION SUBCUTANEOUS at 21:40

## 2019-04-25 RX ADMIN — NITROGLYCERIN 0.4 MG: 0.4 TABLET SUBLINGUAL at 12:46

## 2019-04-25 RX ADMIN — NICOTINE 1 PATCH: 21 PATCH TRANSDERMAL at 14:31

## 2019-04-26 VITALS
RESPIRATION RATE: 18 BRPM | BODY MASS INDEX: 31.13 KG/M2 | HEART RATE: 68 BPM | OXYGEN SATURATION: 98 % | TEMPERATURE: 97.6 F | SYSTOLIC BLOOD PRESSURE: 189 MMHG | WEIGHT: 182.32 LBS | HEIGHT: 64 IN | DIASTOLIC BLOOD PRESSURE: 94 MMHG

## 2019-04-26 PROBLEM — R07.9 CHEST PAIN: Status: RESOLVED | Noted: 2019-04-25 | Resolved: 2019-04-26

## 2019-04-26 LAB
ANION GAP SERPL CALCULATED.3IONS-SCNC: 7 MMOL/L (ref 4–13)
BUN SERPL-MCNC: 15 MG/DL (ref 5–25)
CALCIUM SERPL-MCNC: 9.4 MG/DL (ref 8.3–10.1)
CHLORIDE SERPL-SCNC: 102 MMOL/L (ref 100–108)
CO2 SERPL-SCNC: 30 MMOL/L (ref 21–32)
CREAT SERPL-MCNC: 1.2 MG/DL (ref 0.6–1.3)
ERYTHROCYTE [DISTWIDTH] IN BLOOD BY AUTOMATED COUNT: 13.1 % (ref 11.6–15.1)
GFR SERPL CREATININE-BSD FRML MDRD: 67 ML/MIN/1.73SQ M
GLUCOSE P FAST SERPL-MCNC: 141 MG/DL (ref 65–99)
GLUCOSE SERPL-MCNC: 141 MG/DL (ref 65–140)
GLUCOSE SERPL-MCNC: 141 MG/DL (ref 65–140)
HAV IGM SER QL: NORMAL
HBV CORE IGM SER QL: NORMAL
HBV SURFACE AG SER QL: NORMAL
HCT VFR BLD AUTO: 46.1 % (ref 36.5–49.3)
HCV AB SER QL: NORMAL
HGB BLD-MCNC: 15.1 G/DL (ref 12–17)
MCH RBC QN AUTO: 30.7 PG (ref 26.8–34.3)
MCHC RBC AUTO-ENTMCNC: 32.8 G/DL (ref 31.4–37.4)
MCV RBC AUTO: 94 FL (ref 82–98)
PLATELET # BLD AUTO: 241 THOUSANDS/UL (ref 149–390)
PMV BLD AUTO: 9.8 FL (ref 8.9–12.7)
POTASSIUM SERPL-SCNC: 3.6 MMOL/L (ref 3.5–5.3)
RBC # BLD AUTO: 4.92 MILLION/UL (ref 3.88–5.62)
SODIUM SERPL-SCNC: 139 MMOL/L (ref 136–145)
WBC # BLD AUTO: 7 THOUSAND/UL (ref 4.31–10.16)

## 2019-04-26 PROCEDURE — 99217 PR OBSERVATION CARE DISCHARGE MANAGEMENT: CPT | Performed by: INTERNAL MEDICINE

## 2019-04-26 PROCEDURE — 82948 REAGENT STRIP/BLOOD GLUCOSE: CPT

## 2019-04-26 PROCEDURE — 80048 BASIC METABOLIC PNL TOTAL CA: CPT | Performed by: INTERNAL MEDICINE

## 2019-04-26 PROCEDURE — 85027 COMPLETE CBC AUTOMATED: CPT | Performed by: INTERNAL MEDICINE

## 2019-04-26 RX ORDER — ALPRAZOLAM 0.25 MG/1
0.25 TABLET ORAL 3 TIMES DAILY PRN
Status: DISCONTINUED | OUTPATIENT
Start: 2019-04-26 | End: 2019-04-26 | Stop reason: HOSPADM

## 2019-04-26 RX ORDER — NICOTINE 21 MG/24HR
1 PATCH, TRANSDERMAL 24 HOURS TRANSDERMAL DAILY
Qty: 28 PATCH | Refills: 0 | Status: SHIPPED | OUTPATIENT
Start: 2019-04-26 | End: 2019-09-16 | Stop reason: ALTCHOICE

## 2019-04-26 RX ORDER — ALPRAZOLAM 0.25 MG/1
0.25 TABLET ORAL 3 TIMES DAILY PRN
Qty: 20 TABLET | Refills: 0 | Status: SHIPPED | OUTPATIENT
Start: 2019-04-26 | End: 2019-05-03 | Stop reason: SDUPTHER

## 2019-04-26 RX ADMIN — FAMOTIDINE 20 MG: 20 TABLET ORAL at 06:11

## 2019-04-26 RX ADMIN — NICOTINE 1 PATCH: 21 PATCH TRANSDERMAL at 08:47

## 2019-04-26 RX ADMIN — ALPRAZOLAM 0.25 MG: 0.25 TABLET ORAL at 09:58

## 2019-04-26 RX ADMIN — PAROXETINE 20 MG: 20 TABLET, FILM COATED ORAL at 08:45

## 2019-04-26 RX ADMIN — APIXABAN 5 MG: 5 TABLET, FILM COATED ORAL at 08:46

## 2019-04-26 RX ADMIN — LOSARTAN POTASSIUM: 50 TABLET, FILM COATED ORAL at 08:45

## 2019-04-28 LAB
ATRIAL RATE: 76 BPM
P AXIS: 41 DEGREES
PR INTERVAL: 170 MS
QRS AXIS: 50 DEGREES
QRSD INTERVAL: 92 MS
QT INTERVAL: 428 MS
QTC INTERVAL: 481 MS
T WAVE AXIS: 119 DEGREES
VENTRICULAR RATE: 76 BPM

## 2019-04-28 PROCEDURE — 93010 ELECTROCARDIOGRAM REPORT: CPT | Performed by: INTERNAL MEDICINE

## 2019-04-29 ENCOUNTER — TRANSITIONAL CARE MANAGEMENT (OUTPATIENT)
Dept: FAMILY MEDICINE CLINIC | Facility: CLINIC | Age: 59
End: 2019-04-29

## 2019-05-02 ENCOUNTER — OFFICE VISIT (OUTPATIENT)
Dept: FAMILY MEDICINE CLINIC | Facility: CLINIC | Age: 59
End: 2019-05-02

## 2019-05-02 VITALS
RESPIRATION RATE: 18 BRPM | DIASTOLIC BLOOD PRESSURE: 88 MMHG | WEIGHT: 182 LBS | SYSTOLIC BLOOD PRESSURE: 156 MMHG | TEMPERATURE: 98.2 F | OXYGEN SATURATION: 97 % | BODY MASS INDEX: 31.07 KG/M2 | HEIGHT: 64 IN | HEART RATE: 86 BPM

## 2019-05-02 DIAGNOSIS — Z72.0 TOBACCO ABUSE: ICD-10-CM

## 2019-05-02 DIAGNOSIS — F41.1 GENERALIZED ANXIETY DISORDER: Primary | ICD-10-CM

## 2019-05-02 DIAGNOSIS — R07.89 OTHER CHEST PAIN: ICD-10-CM

## 2019-05-02 PROCEDURE — 1111F DSCHRG MED/CURRENT MED MERGE: CPT | Performed by: FAMILY MEDICINE

## 2019-05-02 PROCEDURE — 99213 OFFICE O/P EST LOW 20 MIN: CPT | Performed by: FAMILY MEDICINE

## 2019-05-03 DIAGNOSIS — F41.9 ANXIETY: ICD-10-CM

## 2019-05-03 RX ORDER — ALPRAZOLAM 0.25 MG/1
0.25 TABLET ORAL 3 TIMES DAILY PRN
Qty: 42 TABLET | Refills: 0 | Status: SHIPPED | OUTPATIENT
Start: 2019-05-03 | End: 2019-05-03 | Stop reason: SDUPTHER

## 2019-05-03 RX ORDER — ALPRAZOLAM 0.25 MG/1
0.25 TABLET ORAL 3 TIMES DAILY PRN
Qty: 42 TABLET | Refills: 0 | Status: SHIPPED | OUTPATIENT
Start: 2019-05-03 | End: 2019-11-01 | Stop reason: SDUPTHER

## 2019-05-09 DIAGNOSIS — I10 ESSENTIAL HYPERTENSION: ICD-10-CM

## 2019-05-13 RX ORDER — LOSARTAN POTASSIUM AND HYDROCHLOROTHIAZIDE 12.5; 1 MG/1; MG/1
TABLET ORAL
Qty: 30 TABLET | Refills: 3 | Status: SHIPPED | OUTPATIENT
Start: 2019-05-13 | End: 2019-08-12 | Stop reason: SDUPTHER

## 2019-08-12 DIAGNOSIS — I10 ESSENTIAL HYPERTENSION: ICD-10-CM

## 2019-08-12 DIAGNOSIS — Z79.4 TYPE 2 DIABETES MELLITUS WITH HYPERGLYCEMIA, WITH LONG-TERM CURRENT USE OF INSULIN (HCC): ICD-10-CM

## 2019-08-12 DIAGNOSIS — Z72.0 TOBACCO ABUSE: ICD-10-CM

## 2019-08-12 DIAGNOSIS — F41.1 GENERALIZED ANXIETY DISORDER: ICD-10-CM

## 2019-08-12 DIAGNOSIS — E11.65 TYPE 2 DIABETES MELLITUS WITH HYPERGLYCEMIA, WITH LONG-TERM CURRENT USE OF INSULIN (HCC): ICD-10-CM

## 2019-08-12 DIAGNOSIS — K21.9 GASTROESOPHAGEAL REFLUX DISEASE WITHOUT ESOPHAGITIS: ICD-10-CM

## 2019-08-12 RX ORDER — PAROXETINE HYDROCHLORIDE 20 MG/1
20 TABLET, FILM COATED ORAL DAILY
Qty: 30 TABLET | Refills: 0 | Status: SHIPPED | OUTPATIENT
Start: 2019-08-12 | End: 2019-09-16 | Stop reason: SDUPTHER

## 2019-08-12 RX ORDER — RANITIDINE 150 MG/1
150 CAPSULE ORAL 2 TIMES DAILY
Qty: 60 CAPSULE | Refills: 0 | Status: SHIPPED | OUTPATIENT
Start: 2019-08-12 | End: 2019-09-16 | Stop reason: ALTCHOICE

## 2019-08-12 RX ORDER — LOSARTAN POTASSIUM AND HYDROCHLOROTHIAZIDE 12.5; 1 MG/1; MG/1
1 TABLET ORAL DAILY
Qty: 30 TABLET | Refills: 0 | Status: SHIPPED | OUTPATIENT
Start: 2019-08-12 | End: 2019-09-16 | Stop reason: ALTCHOICE

## 2019-09-16 ENCOUNTER — OFFICE VISIT (OUTPATIENT)
Dept: FAMILY MEDICINE CLINIC | Facility: CLINIC | Age: 59
End: 2019-09-16

## 2019-09-16 VITALS
HEIGHT: 64 IN | WEIGHT: 186 LBS | RESPIRATION RATE: 18 BRPM | HEART RATE: 101 BPM | SYSTOLIC BLOOD PRESSURE: 180 MMHG | TEMPERATURE: 97.7 F | DIASTOLIC BLOOD PRESSURE: 100 MMHG | OXYGEN SATURATION: 97 % | BODY MASS INDEX: 31.76 KG/M2

## 2019-09-16 DIAGNOSIS — Z71.6 TOBACCO ABUSE COUNSELING: ICD-10-CM

## 2019-09-16 DIAGNOSIS — K21.9 GASTROESOPHAGEAL REFLUX DISEASE WITHOUT ESOPHAGITIS: ICD-10-CM

## 2019-09-16 DIAGNOSIS — F41.1 GENERALIZED ANXIETY DISORDER: ICD-10-CM

## 2019-09-16 DIAGNOSIS — Z79.4 TYPE 2 DIABETES MELLITUS WITHOUT COMPLICATION, WITH LONG-TERM CURRENT USE OF INSULIN (HCC): Primary | ICD-10-CM

## 2019-09-16 DIAGNOSIS — F17.200 TOBACCO DEPENDENCE: ICD-10-CM

## 2019-09-16 DIAGNOSIS — E11.9 TYPE 2 DIABETES MELLITUS WITHOUT COMPLICATION, WITH LONG-TERM CURRENT USE OF INSULIN (HCC): Primary | ICD-10-CM

## 2019-09-16 DIAGNOSIS — I10 ESSENTIAL HYPERTENSION: ICD-10-CM

## 2019-09-16 DIAGNOSIS — I48.0 PAROXYSMAL A-FIB (HCC): ICD-10-CM

## 2019-09-16 DIAGNOSIS — Z72.0 TOBACCO ABUSE: ICD-10-CM

## 2019-09-16 LAB — SL AMB POCT HEMOGLOBIN AIC: 7.9 (ref ?–6.5)

## 2019-09-16 PROCEDURE — 99213 OFFICE O/P EST LOW 20 MIN: CPT | Performed by: FAMILY MEDICINE

## 2019-09-16 PROCEDURE — 82570 ASSAY OF URINE CREATININE: CPT | Performed by: FAMILY MEDICINE

## 2019-09-16 PROCEDURE — 82043 UR ALBUMIN QUANTITATIVE: CPT | Performed by: FAMILY MEDICINE

## 2019-09-16 PROCEDURE — 3051F HG A1C>EQUAL 7.0%<8.0%: CPT | Performed by: FAMILY MEDICINE

## 2019-09-16 PROCEDURE — 83036 HEMOGLOBIN GLYCOSYLATED A1C: CPT | Performed by: FAMILY MEDICINE

## 2019-09-16 PROCEDURE — 99406 BEHAV CHNG SMOKING 3-10 MIN: CPT | Performed by: FAMILY MEDICINE

## 2019-09-16 RX ORDER — PANTOPRAZOLE SODIUM 40 MG/1
40 TABLET, DELAYED RELEASE ORAL DAILY
Qty: 30 TABLET | Refills: 5 | Status: SHIPPED | OUTPATIENT
Start: 2019-09-16 | End: 2020-11-09 | Stop reason: SDUPTHER

## 2019-09-16 RX ORDER — SIMVASTATIN 40 MG
40 TABLET ORAL
Qty: 30 TABLET | Refills: 3 | Status: SHIPPED | OUTPATIENT
Start: 2019-09-16 | End: 2020-04-30

## 2019-09-16 RX ORDER — PEN NEEDLE, DIABETIC 30 GX3/16"
NEEDLE, DISPOSABLE MISCELLANEOUS
COMMUNITY
Start: 2017-07-29 | End: 2019-09-16 | Stop reason: SDUPTHER

## 2019-09-16 RX ORDER — LANCING DEVICE/LANCETS
KIT MISCELLANEOUS
COMMUNITY
Start: 2017-07-28 | End: 2020-11-09 | Stop reason: SDUPTHER

## 2019-09-16 RX ORDER — PAROXETINE HYDROCHLORIDE 20 MG/1
20 TABLET, FILM COATED ORAL DAILY
Qty: 30 TABLET | Refills: 0 | Status: SHIPPED | OUTPATIENT
Start: 2019-09-16 | End: 2019-11-01 | Stop reason: SDUPTHER

## 2019-09-16 RX ORDER — METFORMIN HYDROCHLORIDE 750 MG/1
750 TABLET, EXTENDED RELEASE ORAL
Qty: 30 TABLET | Refills: 2 | Status: SHIPPED | OUTPATIENT
Start: 2019-09-16 | End: 2020-03-18

## 2019-09-16 RX ORDER — LOSARTAN POTASSIUM AND HYDROCHLOROTHIAZIDE 25; 100 MG/1; MG/1
1 TABLET ORAL DAILY
Qty: 90 TABLET | Refills: 2 | Status: SHIPPED | OUTPATIENT
Start: 2019-09-16 | End: 2020-06-01

## 2019-09-16 RX ORDER — PEN NEEDLE, DIABETIC 30 GX3/16"
NEEDLE, DISPOSABLE MISCELLANEOUS 4 TIMES DAILY
Qty: 100 EACH | Refills: 11 | Status: SHIPPED | OUTPATIENT
Start: 2019-09-16 | End: 2020-11-09 | Stop reason: SDUPTHER

## 2019-09-16 RX ORDER — AMLODIPINE BESYLATE 2.5 MG/1
2.5 TABLET ORAL DAILY
Qty: 30 TABLET | Refills: 1 | Status: SHIPPED | OUTPATIENT
Start: 2019-09-16 | End: 2019-11-01 | Stop reason: ALTCHOICE

## 2019-09-16 RX ORDER — VARENICLINE TARTRATE 0.5 MG/1
0.5 TABLET, FILM COATED ORAL 2 TIMES DAILY
Qty: 60 TABLET | Refills: 1 | Status: SHIPPED | OUTPATIENT
Start: 2019-09-16 | End: 2020-11-09 | Stop reason: SDUPTHER

## 2019-09-16 NOTE — PROGRESS NOTES
Assessment/Plan:    Gastroesophageal reflux disease without esophagitis  Controlled with Protonix 40 mg daily  Continue with the current management    Type 2 diabetes mellitus without complication, with long-term current use of insulin ([facility])  Lab Results   Component Value Date    HGBA1C 7 9 (A) 09/16/2019       Not well controlled  Will switch metformin to 750 mg extended-release given the patient's significant GI symptoms  Will increase Lantus to 20 units at bedtime  Will start the patient on Victoza 0 6 mg daily, no family history of thyroid cancer advised the patient about most common adverse effects of week to the which include and not limited to GI upset  Continue with simvastatin  Avoid aspirin given that the patient is currently on Eliquis for his AFib  Advised the patient about the importance of having a yearly eye examination  Advised patient keep a glucose log which will review on the next visit    Paroxysmal a-fib ([address] )  Slightly tachycardic, heart rate is 101 with regular rhythm  Continue with Eliquis 5 mg twice daily    Essential hypertension  Very poorly control, repeat blood pressure is 180/100  Will increase losartan-hydrochlorothiazide 100/25 mg once daily  Will start the patient on amlodipine 2 5 mg daily  Advised the patient about the importance of diet and exercise specifically low-salt diet    Tobacco dependence  Patient smokes about 1 pack daily  He is interested quitting smoking and would like to try shin intact  Will start the patient on Chantix 0 5 mg twice daily  Advised patient that Chantix cause suicidal or homicidal thoughts at which time to stop the medications immediately and contact our office    Tobacco abuse counseling  As noted above  Spent more than 3 minutes counseling the patient about the importance of tobacco cessation    Generalized anxiety disorder  Continue with Paxil 20 mg daily        Return in about 6 weeks (around 10/28/2019) for Recheck, ok to double book         There are no Patient Instructions on file for this visit  Diagnoses and all orders for this visit:    Type 2 diabetes mellitus without complication, with long-term current use of insulin ([facility])  -     POCT hemoglobin A1c  -     Microalbumin / creatinine urine ratio  -     metFORMIN (GLUCOPHAGE-XR) 750 mg 24 hr tablet; Take 1 tablet (750 mg total) by mouth daily with breakfast  -     insulin glargine (LANTUS SOLOSTAR) 100 units/mL injection pen; Inject 28 Units under the skin daily at bedtime  -     liraglutide (VICTOZA) injection; Inject 0 1 mL (0 6 mg total) under the skin daily  -     Insulin Pen Needle (PEN NEEDLES) 31G X 8 MM MISC; Apply topically 4 (four) times a day  -     simvastatin (ZOCOR) 40 mg tablet; Take 1 tablet (40 mg total) by mouth daily at bedtime  -     Ambulatory referral to Ophthalmology; Future    Essential hypertension  -     losartan-hydrochlorothiazide (HYZAAR) 100-25 MG per tablet; Take 1 tablet by mouth daily  -     simvastatin (ZOCOR) 40 mg tablet; Take 1 tablet (40 mg total) by mouth daily at bedtime  -     amLODIPine (NORVASC) 2 5 mg tablet; Take 1 tablet (2 5 mg total) by mouth daily    Paroxysmal A-fib ([facility])  -     apixaban (ELIQUIS) 5 mg; Take 1 tablet (5 mg total) by mouth 2 (two) times a day    Tobacco dependence  -     varenicline (CHANTIX) 0 5 mg tablet; Take 1 tablet (0 5 mg total) by mouth 2 (two) times a day    Tobacco abuse counseling  -     varenicline (CHANTIX) 0 5 mg tablet; Take 1 tablet (0 5 mg total) by mouth 2 (two) times a day    Gastroesophageal reflux disease without esophagitis  -     pantoprazole (PROTONIX) 40 mg tablet; Take 1 tablet (40 mg total) by mouth daily    Generalized anxiety disorder  -     PARoxetine (PAXIL) 20 mg tablet; Take 1 tablet (20 mg total) by mouth daily    Other orders  -     Cancel: Ambulatory referral to Gastroenterology;  Future  -     Discontinue: Insulin Pen Needle (PEN NEEDLES) 31G X 8 MM MISC; use as directed with Apidra twice daily  - Discontinue: Omeprazole 20 MG TBDD; take 1 capsule by oral route  every day before a meal  -     glucose blood test strip; test TID  -     Lancets Misc  (ACCU-CHEK FASTCLIX LANCET) KIT; test TID        Subjective:     Milton Brunner is a 63 y o  male who  has a past medical history of Depression, Diabetes mellitus ([address] ), Hyperlipidemia, Hypertension, and Tobacco abuse  who presented to the office today for hypertension, diabetes and HLD re-evaluation  HPI  Patient mentioned that he would like refills on all his medications  He denied any signs or symptoms hyper hyperglycemia  He does not have any blood glucose log with him during this visit      The following portions of the patient's history were reviewed and updated as appropriate: allergies, current medications, past family history, past medical history, past social history, past surgical history and problem list       Current Outpatient Medications on File Prior to Visit   Medication Sig Dispense Refill    ALPRAZolam (XANAX) 0 25 mg tablet Take 1 tablet (0 25 mg total) by mouth 3 (three) times a day as needed for anxiety 42 tablet 0    glucose blood test strip test TID      Lancets Misc  (ACCU-CHEK FASTCLIX LANCET) KIT test TID      zolpidem (AMBIEN) 10 mg tablet Take 10 mg by mouth daily at bedtime as needed for sleep      [DISCONTINUED] apixaban (ELIQUIS) 5 mg Take 1 tablet (5 mg total) by mouth 2 (two) times a day 60 tablet 12    [DISCONTINUED] insulin glargine (LANTUS SOLOSTAR) 100 units/mL injection pen Inject 25 Units under the skin daily at bedtime 3 pen 3    [DISCONTINUED] Insulin Pen Needle (PEN NEEDLES) 31G X 8 MM MISC use as directed with Apidra twice daily      [DISCONTINUED] losartan-hydrochlorothiazide (HYZAAR) 100-12 5 MG per tablet Take 1 tablet by mouth daily 30 tablet 0    [DISCONTINUED] metFORMIN (GLUCOPHAGE) 1000 MG tablet Take 1 tablet (1,000 mg total) by mouth 2 (two) times a day with meals 60 tablet 0    [DISCONTINUED] Omeprazole 20 MG TBDD take 1 capsule by oral route  every day before a meal      [DISCONTINUED] PARoxetine (PAXIL) 20 mg tablet Take 1 tablet (20 mg total) by mouth daily 30 tablet 0    [DISCONTINUED] ranitidine (ZANTAC) 150 MG capsule Take 1 capsule (150 mg total) by mouth 2 (two) times a day 60 capsule 0    [DISCONTINUED] simvastatin (ZOCOR) 40 mg tablet Take 1 tablet (40 mg total) by mouth daily at bedtime 90 tablet 0    [DISCONTINUED] nicotine (NICODERM CQ) 21 mg/24 hr TD 24 hr patch Place 1 patch on the skin daily (Patient not taking: Reported on 9/16/2019) 28 patch 0     No current facility-administered medications on file prior to visit  Review of Systems   Constitutional: Negative for activity change, appetite change, fatigue, fever and unexpected weight change  Eyes: Negative for visual disturbance  Respiratory: Negative for cough, chest tightness, shortness of breath and wheezing  Cardiovascular: Negative for chest pain, palpitations and leg swelling  Gastrointestinal: Negative for abdominal pain, constipation, diarrhea, nausea and vomiting  Endocrine: Negative for polydipsia and polyuria  Genitourinary: Negative for dysuria  Skin: Negative for rash  Neurological: Negative for syncope, weakness and headaches  Hematological: Negative for adenopathy  Objective:    BP (!) 180/100 (BP Location: Left arm, Patient Position: Sitting, Cuff Size: Standard)   Pulse 101   Temp 97 7 °F (36 5 °C) (Temporal)   Resp 18   Ht 5' 4" (1 626 m)   Wt 84 4 kg (186 lb)   SpO2 97%   BMI 31 93 kg/m²       Physical Exam   Constitutional: He is oriented to person, place, and time  He appears well-developed and well-nourished  No distress  HENT:   Head: Normocephalic and atraumatic  Mouth/Throat: No oropharyngeal exudate  Eyes: Pupils are equal, round, and reactive to light  Conjunctivae are normal  No scleral icterus  Neck: Normal range of motion  Neck supple   No JVD present  Cardiovascular: Normal rate, regular rhythm and normal heart sounds  Exam reveals no gallop and no friction rub  No murmur heard  Pulmonary/Chest: Effort normal and breath sounds normal  No respiratory distress  He has no wheezes  He has no rales  Abdominal: Soft  Bowel sounds are normal  He exhibits no distension  There is no tenderness  There is no rebound  Musculoskeletal: Normal range of motion  He exhibits no edema  Neurological: He is alert and oriented to person, place, and time  No cranial nerve deficit  Skin: Skin is warm and dry  No rash noted  He is not diaphoretic  No erythema         Chris Monzon MD  09/16/19  4:56 PM

## 2019-09-16 NOTE — ASSESSMENT & PLAN NOTE
As noted above  Spent more than 3 minutes counseling the patient about the importance of tobacco cessation

## 2019-09-16 NOTE — ASSESSMENT & PLAN NOTE
Lab Results   Component Value Date    HGBA1C 7 9 (A) 09/16/2019       Not well controlled  Will switch metformin to 750 mg extended-release given the patient's significant GI symptoms  Will increase Lantus to 20 units at bedtime  Will start the patient on Victoza 0 6 mg daily, no family history of thyroid cancer advised the patient about most common adverse effects of week to the which include and not limited to GI upset  Continue with simvastatin  Avoid aspirin given that the patient is currently on Eliquis for his AFib  Advised the patient about the importance of having a yearly eye examination  Advised patient keep a glucose log which will review on the next visit

## 2019-09-16 NOTE — ASSESSMENT & PLAN NOTE
Very poorly control, repeat blood pressure is 180/100  Will increase losartan-hydrochlorothiazide 100/25 mg once daily  Will start the patient on amlodipine 2 5 mg daily  Advised the patient about the importance of diet and exercise specifically low-salt diet

## 2019-09-16 NOTE — ASSESSMENT & PLAN NOTE
Patient smokes about 1 pack daily  He is interested quitting smoking and would like to try shin intact  Will start the patient on Chantix 0 5 mg twice daily  Advised patient that Chantix cause suicidal or homicidal thoughts at which time to stop the medications immediately and contact our office

## 2019-09-17 LAB
CREAT UR-MCNC: 54.2 MG/DL
MICROALBUMIN UR-MCNC: 61.4 MG/L (ref 0–20)
MICROALBUMIN/CREAT 24H UR: 113 MG/G CREATININE (ref 0–30)

## 2019-09-17 PROCEDURE — 3060F POS MICROALBUMINURIA REV: CPT | Performed by: FAMILY MEDICINE

## 2019-09-19 RX ORDER — BUPROPION HYDROCHLORIDE 150 MG/1
TABLET, EXTENDED RELEASE ORAL
Qty: 120 TABLET | Refills: 0 | OUTPATIENT
Start: 2019-09-19

## 2019-11-01 ENCOUNTER — OFFICE VISIT (OUTPATIENT)
Dept: FAMILY MEDICINE CLINIC | Facility: CLINIC | Age: 59
End: 2019-11-01

## 2019-11-01 VITALS
HEIGHT: 64 IN | SYSTOLIC BLOOD PRESSURE: 150 MMHG | OXYGEN SATURATION: 98 % | HEART RATE: 78 BPM | TEMPERATURE: 96.1 F | RESPIRATION RATE: 20 BRPM | WEIGHT: 190 LBS | BODY MASS INDEX: 32.44 KG/M2 | DIASTOLIC BLOOD PRESSURE: 100 MMHG

## 2019-11-01 DIAGNOSIS — I10 ESSENTIAL HYPERTENSION: Primary | ICD-10-CM

## 2019-11-01 DIAGNOSIS — F41.1 GENERALIZED ANXIETY DISORDER: ICD-10-CM

## 2019-11-01 PROCEDURE — 3008F BODY MASS INDEX DOCD: CPT | Performed by: FAMILY MEDICINE

## 2019-11-01 PROCEDURE — G0008 ADMIN INFLUENZA VIRUS VAC: HCPCS | Performed by: FAMILY MEDICINE

## 2019-11-01 PROCEDURE — 99213 OFFICE O/P EST LOW 20 MIN: CPT | Performed by: FAMILY MEDICINE

## 2019-11-01 PROCEDURE — 90682 RIV4 VACC RECOMBINANT DNA IM: CPT | Performed by: FAMILY MEDICINE

## 2019-11-01 PROCEDURE — 4004F PT TOBACCO SCREEN RCVD TLK: CPT | Performed by: FAMILY MEDICINE

## 2019-11-01 RX ORDER — PAROXETINE HYDROCHLORIDE 20 MG/1
20 TABLET, FILM COATED ORAL DAILY
Qty: 30 TABLET | Refills: 2 | Status: SHIPPED | OUTPATIENT
Start: 2019-11-01 | End: 2020-11-09 | Stop reason: SDUPTHER

## 2019-11-01 RX ORDER — ALPRAZOLAM 0.25 MG/1
0.25 TABLET ORAL 2 TIMES DAILY PRN
Qty: 60 TABLET | Refills: 0 | Status: SHIPPED | OUTPATIENT
Start: 2019-11-01 | End: 2019-12-20 | Stop reason: SDUPTHER

## 2019-11-01 RX ORDER — AMLODIPINE BESYLATE 5 MG/1
5 TABLET ORAL DAILY
Qty: 30 TABLET | Refills: 1 | Status: SHIPPED | OUTPATIENT
Start: 2019-11-01 | End: 2021-08-16

## 2019-11-01 NOTE — ASSESSMENT & PLAN NOTE
Will refill Xanax 0 25 milligrams twice daily as needed for 60 tablets  PDMP was reviewed and the last time the patient had a Xanax script was from this office in May of 2019  Continue with Paxil 20 milligrams once daily

## 2019-11-01 NOTE — ASSESSMENT & PLAN NOTE
Not at goal, blood pressure on repeat is 150/100  Given the patient's current age and risk factors, blood pressure goal of less than 140/90  Will continue with losartan/hydrochlorothiazide 100-25 milligrams once daily  Will increase Norvasc to 5 milligrams once daily  Advised patient and the importance of diet and exercise specifically low-salt diet

## 2019-11-01 NOTE — PROGRESS NOTES
Assessment/Plan:    Essential hypertension  Not at goal, blood pressure on repeat is 150/100  Given the patient's current age and risk factors, blood pressure goal of less than 140/90  Will continue with losartan/hydrochlorothiazide 100-25 milligrams once daily  Will increase Norvasc to 5 milligrams once daily  Advised patient and the importance of diet and exercise specifically low-salt diet    Generalized anxiety disorder  Will refill Xanax 0 25 milligrams twice daily as needed for 60 tablets  PDMP was reviewed and the last time the patient had a Xanax script was from this office in May of 2019  Continue with Paxil 20 milligrams once daily      Return in about 6 weeks (around 12/13/2019) for Recheck for DM  Diagnoses and all orders for this visit:    Essential hypertension  -     amLODIPine (NORVASC) 5 mg tablet; Take 1 tablet (5 mg total) by mouth daily  -     influenza vaccine, [id number], quadrivalent, recombinant, PF, 0 5 mL, for patients 18 yr+ (FLUBLOK)    Generalized anxiety disorder  -     PARoxetine (PAXIL) 20 mg tablet; Take 1 tablet (20 mg total) by mouth daily  -     ALPRAZolam (XANAX) 0 25 mg tablet; Take 1 tablet (0 25 mg total) by mouth 2 (two) times a day as needed for anxiety    Other orders  -     Cancel: Ambulatory referral to Gastroenterology; Future        Subjective:     Jamila Curran is a 63 y o  male who  has a past medical history of Depression, Diabetes mellitus ([address] ), Hyperlipidemia, Hypertension, and Tobacco abuse  who presented to the office today for hypertension re-evaluation  HPI  Patient is accompanied by his wife who helped with the interpretation  Patient did not voice any complaints  He denied any persistent headache, recent change in vision, chest pain, palpitation, abdominal pain, nausea or vomiting  He would like refill on his Xanax        The following portions of the patient's history were reviewed and updated as appropriate: allergies, current medications, past family history, past medical history, past social history, past surgical history and problem list       Current Outpatient Medications on File Prior to Visit   Medication Sig Dispense Refill    apixaban (ELIQUIS) 5 mg Take 1 tablet (5 mg total) by mouth 2 (two) times a day 60 tablet 2    glucose blood test strip test TID      Insulin Pen Needle (PEN NEEDLES) 31G X 8 MM MISC Apply topically 4 (four) times a day 100 each 11    Lancets Misc  (ACCU-CHEK FASTCLIX LANCET) KIT test TID      losartan-hydrochlorothiazide (HYZAAR) 100-25 MG per tablet Take 1 tablet by mouth daily 90 tablet 2    metFORMIN (GLUCOPHAGE-XR) 750 mg 24 hr tablet Take 1 tablet (750 mg total) by mouth daily with breakfast 30 tablet 2    simvastatin (ZOCOR) 40 mg tablet Take 1 tablet (40 mg total) by mouth daily at bedtime 30 tablet 3    zolpidem (AMBIEN) 10 mg tablet Take 10 mg by mouth daily at bedtime as needed for sleep      [DISCONTINUED] ALPRAZolam (XANAX) 0 25 mg tablet Take 1 tablet (0 25 mg total) by mouth 3 (three) times a day as needed for anxiety 42 tablet 0    [DISCONTINUED] amLODIPine (NORVASC) 2 5 mg tablet Take 1 tablet (2 5 mg total) by mouth daily 30 tablet 1    [DISCONTINUED] PARoxetine (PAXIL) 20 mg tablet Take 1 tablet (20 mg total) by mouth daily 30 tablet 0    insulin glargine (LANTUS SOLOSTAR) 100 units/mL injection pen Inject 28 Units under the skin daily at bedtime 3 pen 2    liraglutide (VICTOZA) injection Inject 0 1 mL (0 6 mg total) under the skin daily 3 mL 2    pantoprazole (PROTONIX) 40 mg tablet Take 1 tablet (40 mg total) by mouth daily 30 tablet 5    varenicline (CHANTIX) 0 5 mg tablet Take 1 tablet (0 5 mg total) by mouth 2 (two) times a day (Patient not taking: Reported on 11/1/2019) 60 tablet 1     No current facility-administered medications on file prior to visit          Review of Systems   Constitutional: Negative for activity change, appetite change, fatigue, fever and unexpected weight change  HENT: Negative for trouble swallowing  Eyes: Negative for visual disturbance  Respiratory: Negative for chest tightness  Cardiovascular: Negative for chest pain, palpitations and leg swelling  Gastrointestinal: Negative for abdominal pain, nausea and vomiting  Endocrine: Negative for polydipsia and polyuria  Genitourinary: Negative for dysuria  Skin: Negative for rash  Neurological: Negative for dizziness, weakness, light-headedness and headaches  Hematological: Negative for adenopathy  Objective:    /100 (BP Location: Right arm, Patient Position: Sitting, Cuff Size: Standard)   Pulse 78   Temp (!) 96 1 °F (35 6 °C) (Skin)   Resp 20   Ht 5' 4" (1 626 m)   Wt 86 2 kg (190 lb)   SpO2 98%   BMI 32 61 kg/m²       Physical Exam   Constitutional: He is oriented to person, place, and time  He appears well-developed and well-nourished  No distress  HENT:   Head: Normocephalic and atraumatic  Nose: Nose normal    Eyes: Pupils are equal, round, and reactive to light  Conjunctivae are normal    Neck: Normal range of motion  Neck supple  Cardiovascular: Normal rate, regular rhythm and normal heart sounds  Exam reveals no gallop and no friction rub  No murmur heard  Pulmonary/Chest: Effort normal and breath sounds normal  No respiratory distress  He has no wheezes  He has no rales  Abdominal: Soft  Bowel sounds are normal  He exhibits no distension  There is no tenderness  Musculoskeletal: Normal range of motion  He exhibits no edema  Neurological: He is alert and oriented to person, place, and time  Skin: Skin is warm and dry  No rash noted  He is not diaphoretic  No erythema         Don Schwarz MD  11/01/19  1:55 PM

## 2019-12-20 DIAGNOSIS — F41.1 GENERALIZED ANXIETY DISORDER: ICD-10-CM

## 2019-12-23 RX ORDER — ALPRAZOLAM 0.25 MG/1
TABLET ORAL
Qty: 60 TABLET | Refills: 0 | Status: SHIPPED | OUTPATIENT
Start: 2019-12-23

## 2020-03-18 DIAGNOSIS — I48.0 PAROXYSMAL A-FIB (HCC): ICD-10-CM

## 2020-03-18 DIAGNOSIS — Z79.4 TYPE 2 DIABETES MELLITUS WITHOUT COMPLICATION, WITH LONG-TERM CURRENT USE OF INSULIN (HCC): ICD-10-CM

## 2020-03-18 DIAGNOSIS — E11.9 TYPE 2 DIABETES MELLITUS WITHOUT COMPLICATION, WITH LONG-TERM CURRENT USE OF INSULIN (HCC): ICD-10-CM

## 2020-03-18 RX ORDER — APIXABAN 5 MG/1
TABLET, FILM COATED ORAL
Qty: 60 TABLET | Refills: 0 | Status: SHIPPED | OUTPATIENT
Start: 2020-03-18 | End: 2020-06-04 | Stop reason: SDUPTHER

## 2020-03-18 RX ORDER — METFORMIN HYDROCHLORIDE 750 MG/1
TABLET, EXTENDED RELEASE ORAL
Qty: 30 TABLET | Refills: 0 | Status: SHIPPED | OUTPATIENT
Start: 2020-03-18 | End: 2020-06-02 | Stop reason: SDUPTHER

## 2020-04-30 DIAGNOSIS — E11.9 TYPE 2 DIABETES MELLITUS WITHOUT COMPLICATION, WITH LONG-TERM CURRENT USE OF INSULIN (HCC): ICD-10-CM

## 2020-04-30 DIAGNOSIS — Z79.4 TYPE 2 DIABETES MELLITUS WITHOUT COMPLICATION, WITH LONG-TERM CURRENT USE OF INSULIN (HCC): ICD-10-CM

## 2020-04-30 DIAGNOSIS — I10 ESSENTIAL HYPERTENSION: ICD-10-CM

## 2020-04-30 RX ORDER — SIMVASTATIN 40 MG
TABLET ORAL
Qty: 30 TABLET | Refills: 3 | Status: SHIPPED | OUTPATIENT
Start: 2020-04-30 | End: 2020-11-09 | Stop reason: SDUPTHER

## 2020-06-01 DIAGNOSIS — I10 ESSENTIAL HYPERTENSION: ICD-10-CM

## 2020-06-01 RX ORDER — LOSARTAN POTASSIUM AND HYDROCHLOROTHIAZIDE 25; 100 MG/1; MG/1
1 TABLET ORAL DAILY
Qty: 90 TABLET | Refills: 0 | Status: SHIPPED | OUTPATIENT
Start: 2020-06-01 | End: 2020-11-09 | Stop reason: SDUPTHER

## 2020-06-01 RX ORDER — AMLODIPINE BESYLATE 10 MG/1
TABLET ORAL
Qty: 30 TABLET | Refills: 0 | Status: SHIPPED | OUTPATIENT
Start: 2020-06-01 | End: 2020-11-09 | Stop reason: SDUPTHER

## 2020-06-02 DIAGNOSIS — F41.1 GENERALIZED ANXIETY DISORDER: ICD-10-CM

## 2020-06-02 DIAGNOSIS — E11.9 TYPE 2 DIABETES MELLITUS WITHOUT COMPLICATION, WITH LONG-TERM CURRENT USE OF INSULIN (HCC): ICD-10-CM

## 2020-06-02 DIAGNOSIS — Z79.4 TYPE 2 DIABETES MELLITUS WITHOUT COMPLICATION, WITH LONG-TERM CURRENT USE OF INSULIN (HCC): ICD-10-CM

## 2020-06-02 RX ORDER — METFORMIN HYDROCHLORIDE 750 MG/1
750 TABLET, EXTENDED RELEASE ORAL
Qty: 30 TABLET | Refills: 0 | Status: SHIPPED | OUTPATIENT
Start: 2020-06-02 | End: 2020-11-09

## 2020-06-02 RX ORDER — ALPRAZOLAM 0.25 MG/1
0.25 TABLET ORAL 2 TIMES DAILY PRN
Qty: 60 TABLET | Refills: 0 | OUTPATIENT
Start: 2020-06-02

## 2020-06-04 DIAGNOSIS — I48.0 PAROXYSMAL A-FIB (HCC): ICD-10-CM

## 2020-11-09 ENCOUNTER — OFFICE VISIT (OUTPATIENT)
Dept: FAMILY MEDICINE CLINIC | Facility: CLINIC | Age: 60
End: 2020-11-09

## 2020-11-09 VITALS
TEMPERATURE: 98 F | HEART RATE: 81 BPM | SYSTOLIC BLOOD PRESSURE: 136 MMHG | DIASTOLIC BLOOD PRESSURE: 86 MMHG | WEIGHT: 183 LBS | OXYGEN SATURATION: 98 % | BODY MASS INDEX: 31.41 KG/M2 | RESPIRATION RATE: 16 BRPM

## 2020-11-09 DIAGNOSIS — Z79.4 TYPE 2 DIABETES MELLITUS WITHOUT COMPLICATION, WITH LONG-TERM CURRENT USE OF INSULIN (HCC): ICD-10-CM

## 2020-11-09 DIAGNOSIS — F17.200 TOBACCO DEPENDENCE: ICD-10-CM

## 2020-11-09 DIAGNOSIS — I10 ESSENTIAL HYPERTENSION: Primary | ICD-10-CM

## 2020-11-09 DIAGNOSIS — I48.0 PAROXYSMAL A-FIB (HCC): ICD-10-CM

## 2020-11-09 DIAGNOSIS — M25.512 CHRONIC LEFT SHOULDER PAIN: ICD-10-CM

## 2020-11-09 DIAGNOSIS — K21.9 GASTROESOPHAGEAL REFLUX DISEASE WITHOUT ESOPHAGITIS: ICD-10-CM

## 2020-11-09 DIAGNOSIS — G89.29 CHRONIC LEFT SHOULDER PAIN: ICD-10-CM

## 2020-11-09 DIAGNOSIS — F41.1 GENERALIZED ANXIETY DISORDER: ICD-10-CM

## 2020-11-09 DIAGNOSIS — R05.9 COUGH: ICD-10-CM

## 2020-11-09 DIAGNOSIS — Z71.6 TOBACCO ABUSE COUNSELING: ICD-10-CM

## 2020-11-09 DIAGNOSIS — E11.9 TYPE 2 DIABETES MELLITUS WITHOUT COMPLICATION, WITH LONG-TERM CURRENT USE OF INSULIN (HCC): ICD-10-CM

## 2020-11-09 PROCEDURE — 4004F PT TOBACCO SCREEN RCVD TLK: CPT | Performed by: INTERNAL MEDICINE

## 2020-11-09 PROCEDURE — 3075F SYST BP GE 130 - 139MM HG: CPT | Performed by: INTERNAL MEDICINE

## 2020-11-09 PROCEDURE — 99213 OFFICE O/P EST LOW 20 MIN: CPT | Performed by: INTERNAL MEDICINE

## 2020-11-09 PROCEDURE — 3725F SCREEN DEPRESSION PERFORMED: CPT | Performed by: INTERNAL MEDICINE

## 2020-11-09 PROCEDURE — 3079F DIAST BP 80-89 MM HG: CPT | Performed by: INTERNAL MEDICINE

## 2020-11-09 RX ORDER — PANTOPRAZOLE SODIUM 40 MG/1
40 TABLET, DELAYED RELEASE ORAL DAILY
Qty: 30 TABLET | Refills: 5 | Status: SHIPPED | OUTPATIENT
Start: 2020-11-09 | End: 2021-08-16 | Stop reason: SDUPTHER

## 2020-11-09 RX ORDER — VARENICLINE TARTRATE 0.5 MG/1
0.5 TABLET, FILM COATED ORAL 2 TIMES DAILY
Qty: 60 TABLET | Refills: 0 | Status: SHIPPED | OUTPATIENT
Start: 2020-11-09

## 2020-11-09 RX ORDER — LOSARTAN POTASSIUM AND HYDROCHLOROTHIAZIDE 25; 100 MG/1; MG/1
1 TABLET ORAL DAILY
Qty: 90 TABLET | Refills: 0 | Status: SHIPPED | OUTPATIENT
Start: 2020-11-09 | End: 2021-08-16 | Stop reason: SDUPTHER

## 2020-11-09 RX ORDER — AMLODIPINE BESYLATE 10 MG/1
10 TABLET ORAL DAILY
Qty: 30 TABLET | Refills: 0 | Status: SHIPPED | OUTPATIENT
Start: 2020-11-09 | End: 2021-08-16 | Stop reason: SDUPTHER

## 2020-11-09 RX ORDER — VARENICLINE TARTRATE 25 MG
KIT ORAL
Qty: 53 TABLET | Refills: 0 | Status: SHIPPED | OUTPATIENT
Start: 2020-11-09

## 2020-11-09 RX ORDER — OXYCODONE HYDROCHLORIDE 30 MG/1
TABLET ORAL
COMMUNITY
Start: 2020-11-01

## 2020-11-09 RX ORDER — ALBUTEROL SULFATE 90 UG/1
2 AEROSOL, METERED RESPIRATORY (INHALATION) EVERY 6 HOURS PRN
Qty: 1 INHALER | Refills: 1 | Status: SHIPPED | OUTPATIENT
Start: 2020-11-09

## 2020-11-09 RX ORDER — SIMVASTATIN 40 MG
40 TABLET ORAL
Qty: 30 TABLET | Refills: 3 | Status: SHIPPED | OUTPATIENT
Start: 2020-11-09 | End: 2021-08-16 | Stop reason: SDUPTHER

## 2020-11-09 RX ORDER — PEN NEEDLE, DIABETIC 30 GX3/16"
NEEDLE, DISPOSABLE MISCELLANEOUS 4 TIMES DAILY
Qty: 100 EACH | Refills: 11 | Status: SHIPPED | OUTPATIENT
Start: 2020-11-09

## 2020-11-09 RX ORDER — PAROXETINE HYDROCHLORIDE 20 MG/1
20 TABLET, FILM COATED ORAL DAILY
Qty: 30 TABLET | Refills: 2 | Status: SHIPPED | OUTPATIENT
Start: 2020-11-09 | End: 2021-08-16 | Stop reason: SDUPTHER

## 2020-11-09 RX ORDER — INSULIN GLARGINE 100 [IU]/ML
28 INJECTION, SOLUTION SUBCUTANEOUS
Qty: 3 PEN | Refills: 0 | Status: SHIPPED | OUTPATIENT
Start: 2020-11-09 | End: 2021-08-16 | Stop reason: SDUPTHER

## 2020-11-09 RX ORDER — LANCING DEVICE/LANCETS
KIT MISCELLANEOUS 2 TIMES DAILY
Qty: 100 KIT | Refills: 2 | Status: SHIPPED | OUTPATIENT
Start: 2020-11-09

## 2021-08-16 ENCOUNTER — OFFICE VISIT (OUTPATIENT)
Dept: FAMILY MEDICINE CLINIC | Facility: CLINIC | Age: 61
End: 2021-08-16

## 2021-08-16 VITALS
RESPIRATION RATE: 16 BRPM | WEIGHT: 180 LBS | TEMPERATURE: 97.6 F | BODY MASS INDEX: 28.93 KG/M2 | SYSTOLIC BLOOD PRESSURE: 165 MMHG | OXYGEN SATURATION: 97 % | HEART RATE: 93 BPM | DIASTOLIC BLOOD PRESSURE: 85 MMHG | HEIGHT: 66 IN

## 2021-08-16 DIAGNOSIS — Z79.4 TYPE 2 DIABETES MELLITUS WITHOUT COMPLICATION, WITH LONG-TERM CURRENT USE OF INSULIN (HCC): Primary | ICD-10-CM

## 2021-08-16 DIAGNOSIS — F41.1 GENERALIZED ANXIETY DISORDER: ICD-10-CM

## 2021-08-16 DIAGNOSIS — R39.198 DIFFICULTY URINATING: ICD-10-CM

## 2021-08-16 DIAGNOSIS — I48.0 PAROXYSMAL A-FIB (HCC): ICD-10-CM

## 2021-08-16 DIAGNOSIS — K21.9 GASTROESOPHAGEAL REFLUX DISEASE WITHOUT ESOPHAGITIS: ICD-10-CM

## 2021-08-16 DIAGNOSIS — I10 ESSENTIAL HYPERTENSION: ICD-10-CM

## 2021-08-16 DIAGNOSIS — R05.9 COUGH: ICD-10-CM

## 2021-08-16 DIAGNOSIS — E11.9 TYPE 2 DIABETES MELLITUS WITHOUT COMPLICATION, WITH LONG-TERM CURRENT USE OF INSULIN (HCC): Primary | ICD-10-CM

## 2021-08-16 LAB — SL AMB POCT HEMOGLOBIN AIC: 9.9 (ref ?–6.5)

## 2021-08-16 PROCEDURE — 83036 HEMOGLOBIN GLYCOSYLATED A1C: CPT | Performed by: FAMILY MEDICINE

## 2021-08-16 PROCEDURE — 99213 OFFICE O/P EST LOW 20 MIN: CPT | Performed by: FAMILY MEDICINE

## 2021-08-16 RX ORDER — TAMSULOSIN HYDROCHLORIDE 0.4 MG/1
0.4 CAPSULE ORAL
Qty: 30 CAPSULE | Refills: 3 | Status: SHIPPED | OUTPATIENT
Start: 2021-08-16

## 2021-08-16 RX ORDER — FLUTICASONE PROPIONATE 50 MCG
1 SPRAY, SUSPENSION (ML) NASAL DAILY
Qty: 18.2 ML | Refills: 1 | Status: SHIPPED | OUTPATIENT
Start: 2021-08-16

## 2021-08-16 RX ORDER — FLUTICASONE PROPIONATE 44 MCG
2 AEROSOL WITH ADAPTER (GRAM) INHALATION 2 TIMES DAILY
Qty: 10.6 G | Refills: 1 | Status: SHIPPED | OUTPATIENT
Start: 2021-08-16

## 2021-08-16 RX ORDER — LOSARTAN POTASSIUM AND HYDROCHLOROTHIAZIDE 25; 100 MG/1; MG/1
1 TABLET ORAL DAILY
Qty: 90 TABLET | Refills: 1 | Status: SHIPPED | OUTPATIENT
Start: 2021-08-16

## 2021-08-16 RX ORDER — BENZONATATE 100 MG/1
100 CAPSULE ORAL 3 TIMES DAILY PRN
Qty: 20 CAPSULE | Refills: 0 | Status: SHIPPED | OUTPATIENT
Start: 2021-08-16

## 2021-08-16 RX ORDER — PAROXETINE HYDROCHLORIDE 20 MG/1
20 TABLET, FILM COATED ORAL DAILY
Qty: 30 TABLET | Refills: 2 | Status: SHIPPED | OUTPATIENT
Start: 2021-08-16

## 2021-08-16 RX ORDER — SIMVASTATIN 40 MG
40 TABLET ORAL
Qty: 90 TABLET | Refills: 1 | Status: SHIPPED | OUTPATIENT
Start: 2021-08-16

## 2021-08-16 RX ORDER — GUAIFENESIN/DEXTROMETHORPHAN 100-10MG/5
5 SYRUP ORAL 3 TIMES DAILY PRN
Qty: 354 ML | Refills: 1 | Status: SHIPPED | OUTPATIENT
Start: 2021-08-16

## 2021-08-16 RX ORDER — INSULIN GLARGINE 100 [IU]/ML
35 INJECTION, SOLUTION SUBCUTANEOUS
Qty: 21 ML | Refills: 1 | Status: SHIPPED | OUTPATIENT
Start: 2021-08-16

## 2021-08-16 RX ORDER — PANTOPRAZOLE SODIUM 40 MG/1
40 TABLET, DELAYED RELEASE ORAL DAILY
Qty: 30 TABLET | Refills: 1 | Status: SHIPPED | OUTPATIENT
Start: 2021-08-16

## 2021-08-16 RX ORDER — AMLODIPINE BESYLATE 10 MG/1
10 TABLET ORAL DAILY
Qty: 90 TABLET | Refills: 1 | Status: SHIPPED | OUTPATIENT
Start: 2021-08-16 | End: 2021-11-15 | Stop reason: SDUPTHER

## 2021-08-16 NOTE — ASSESSMENT & PLAN NOTE
Uncontrolled; Patient not taking all medications; last filled in November for 90 day supply  Non-compliance due to insurance issues that have been since resolved  - Restart Losartan-Hydrochlorothiazide 100-25 mg daily and Amlodipine 10 mg HS  - 4 week follow-up for hypertension

## 2021-08-16 NOTE — ASSESSMENT & PLAN NOTE
Chronic, at times productive  No hemoptysis  Initially it was thought to be related to lisinopril however this was switched  Patient quit smoking 5 months ago and previously smoked for 5-6 years  1 ppd  Differential is broad;   Possibly post nasal drip as nasal turbinates are edematous and erythematous however Claritin not helpful  COPD/asthma however only smoked for 5-6 years and no hx of asthma prior but will order PFTs  He has traveled in the past, therefore will rule out Tb with quantiferon GOLD  Chest x-ray ordered given long duration of cough  Less likely CHF as lungs are clear without rales and no peripheral edema  - Chest X-ray  - PFTs  - Quantiferon gold  - Flonase  - Robitussin  - Benzonatate  - Restart PPI  - Start Flovent daily  Albuterol PRN however careful of tachycardia given paroxysmal afib  - Consider stopping ARB as there may be cross reactivity between ACE and ARB

## 2021-08-16 NOTE — PROGRESS NOTES
Assessment/Plan:    Type 2 diabetes mellitus without complication, with long-term current use of insulin (HCC)    Lab Results   Component Value Date    HGBA1C 9 9 (A) 08/16/2021       Uncontrolled, worsening since last office visit (8 4)  Goal <7 0  Patient admits to running out of medications therefore glucose was uncontrolled  Current medications that include Lantus 28 units HS, and metformin 1000 mg twice daily   - Increase lantus to 35 Units HS   - Diabetic diet  Cough  Chronic, at times productive  No hemoptysis  Initially it was thought to be related to lisinopril however this was switched  Patient quit smoking 5 months ago and previously smoked for 5-6 years  1 ppd  Differential is broad;   Possibly post nasal drip as nasal turbinates are edematous and erythematous however Claritin not helpful  COPD/asthma however only smoked for 5-6 years and no hx of asthma prior but will order PFTs  He has traveled in the past, therefore will rule out Tb with quantiferon GOLD  Chest x-ray ordered given long duration of cough  Less likely CHF as lungs are clear without rales and no peripheral edema  - Chest X-ray  - PFTs  - Quantiferon gold  - Flonase  - Robitussin  - Benzonatate  - Restart PPI  - Start Flovent daily  Albuterol PRN however careful of tachycardia given paroxysmal afib  - Consider stopping ARB as there may be cross reactivity between ACE and ARB  Paroxysmal a-fib (HCC)  Heart rate regular with a rate of 93 bpm   Not taking eliquis due to previous insurance issues  ChadsVasc of 2   - Restart Eliquis  - Referral to cardiology  Essential hypertension  Uncontrolled; Patient not taking all medications; last filled in November for 90 day supply  Non-compliance due to insurance issues that have been since resolved  - Restart Losartan-Hydrochlorothiazide 100-25 mg daily and Amlodipine 10 mg HS  - 4 week follow-up for hypertension      Difficulty urinating  History of prostate pathology in the past but unclear of the diagnosis  Patient has incomplete emptying   - Order PSA  - Flomax ordered  Diagnoses and all orders for this visit:    Type 2 diabetes mellitus without complication, with long-term current use of insulin ([facility])  -     POCT hemoglobin A1c  -     simvastatin (ZOCOR) 40 mg tablet; Take 1 tablet (40 mg total) by mouth daily at bedtime  -     CBC and differential; Future  -     Comprehensive metabolic panel; Future  -     Lipid panel; Future  -     insulin glargine (Lantus SoloStar) 100 units/mL injection pen; Inject 35 Units under the skin daily at bedtime    Essential hypertension  -     losartan-hydrochlorothiazide (HYZAAR) 100-25 MG per tablet; Take 1 tablet by mouth daily  -     amLODIPine (NORVASC) 10 mg tablet; Take 1 tablet (10 mg total) by mouth daily  -     simvastatin (ZOCOR) 40 mg tablet; Take 1 tablet (40 mg total) by mouth daily at bedtime  -     CBC and differential; Future  -     Comprehensive metabolic panel; Future    Paroxysmal A-fib ([facility])  -     apixaban (Eliquis) 5 mg; Take 1 tablet (5 mg total) by mouth 2 (two) times a day  -     Ambulatory referral to Cardiology; Future  -     CBC and differential; Future  -     Comprehensive metabolic panel; Future    Cough  -     benzonatate (TESSALON PERLES) 100 mg capsule; Take 1 capsule (100 mg total) by mouth 3 (three) times a day as needed for cough  -     dextromethorphan-guaiFENesin (ROBITUSSIN DM)  mg/5 mL syrup; Take 5 mL by mouth 3 (three) times a day as needed for cough  -     XR chest pa & lateral; Future  -     Quantiferon TB Gold Plus; Future  -     fluticasone (FLONASE) 50 mcg/act nasal spray; 1 spray into each nostril daily  -     fluticasone (Flovent HFA) 44 mcg/act inhaler; Inhale 2 puffs 2 (two) times a day Rinse mouth after use  Generalized anxiety disorder  -     PARoxetine (PAXIL) 20 mg tablet; Take 1 tablet (20 mg total) by mouth daily    Difficulty urinating  -     tamsulosin (FLOMAX) 0 4 mg;  Take 1 capsule (0 4 mg total) by mouth daily with dinner    Gastroesophageal reflux disease without esophagitis  -     pantoprazole (PROTONIX) 40 mg tablet; Take 1 tablet (40 mg total) by mouth daily          Subjective:      Patient ID: Anatoliy Reeder is a 62 y o  male  Patient is a very pleasant 62year old male who presents to the clinic for DM2 follow-up  Patient was lost to follow-up for approximately 9 months  He has hx of HTN, Paroxsymal a-fib, and chronic cough  For DM2, there are times when he forgets to take his insulin  He has not been taking any medications for HTN since last office visit  He also has been complaining of a cough that is at times productive that has been ongoing for years however worsening of the past few months  He denies any hemoptysis  He states that he quit smoking 5 months ago and quit for 5-6 years prior 1 pack per day  No relation to food  Has trialed Claritin without improvement  No recent travel  No weight loss  The following portions of the patient's history were reviewed and updated as appropriate:  he  He  has a past medical history of Depression, Diabetes mellitus ([address] ), Hyperlipidemia, Hypertension, and Tobacco abuse  He   Patient Active Problem List    Diagnosis Date Noted    Difficulty urinating 08/16/2021    Cough 11/09/2020    Tobacco abuse counseling 09/16/2019    Generalized anxiety disorder 11/01/2018    Gastroesophageal reflux disease without esophagitis 10/11/2018    Primary insomnia 10/08/2018    Chronic left shoulder pain 10/08/2018    Chronic midline low back pain without sciatica 10/08/2018    Snoring 10/08/2018    Chronic nausea 09/30/2016    Paroxysmal A-fib ([address] ) 09/28/2016    Essential hypertension     Hyperlipidemia     Type 2 diabetes mellitus without complication, with long-term current use of insulin ([address] )     Tobacco dependence      He  has a past surgical history that includes Appendectomy and Shoulder arthroscopy    His family history is not on file  He  reports that he has been smoking cigarettes  He has been smoking about 1 00 pack per day  He uses smokeless tobacco  He reports that he does not drink alcohol and does not use drugs  Current Outpatient Medications   Medication Sig Dispense Refill    albuterol (PROVENTIL HFA,VENTOLIN HFA) 90 mcg/act inhaler Inhale 2 puffs every 6 (six) hours as needed for wheezing or shortness of breath 1 Inhaler 1    ALPRAZolam (XANAX) 0 25 mg tablet TAKE 1 TABLET BY MOUTH TWICE DAILY AS NEEDED FOR ANXIETY 60 tablet 0    amLODIPine (NORVASC) 10 mg tablet Take 1 tablet (10 mg total) by mouth daily 90 tablet 1    apixaban (Eliquis) 5 mg Take 1 tablet (5 mg total) by mouth 2 (two) times a day 120 tablet 2    glucose blood test strip 1 each by Other route 2 (two) times a day Use as instructed 100 each 1    insulin glargine (Lantus SoloStar) 100 units/mL injection pen Inject 35 Units under the skin daily at bedtime 21 mL 1    Insulin Pen Needle (Pen Needles) 31G X 8 MM MISC Apply topically 4 (four) times a day 100 each 11    Lancets Misc   (Accu-Chek FastClix Lancet) KIT Inject under the skin 2 (two) times a day 100 kit 2    losartan-hydrochlorothiazide (HYZAAR) 100-25 MG per tablet Take 1 tablet by mouth daily 90 tablet 1    metFORMIN (GLUCOPHAGE) 1000 MG tablet Take 1 tablet (1,000 mg total) by mouth 2 (two) times a day with meals 90 tablet 3    pantoprazole (PROTONIX) 40 mg tablet Take 1 tablet (40 mg total) by mouth daily 30 tablet 1    PARoxetine (PAXIL) 20 mg tablet Take 1 tablet (20 mg total) by mouth daily 30 tablet 2    simvastatin (ZOCOR) 40 mg tablet Take 1 tablet (40 mg total) by mouth daily at bedtime 90 tablet 1    zolpidem (AMBIEN) 10 mg tablet Take 10 mg by mouth daily at bedtime as needed for sleep      benzonatate (TESSALON PERLES) 100 mg capsule Take 1 capsule (100 mg total) by mouth 3 (three) times a day as needed for cough 20 capsule 0    dextromethorphan-guaiFENesin (ROBITUSSIN DM)  mg/5 mL syrup Take 5 mL by mouth 3 (three) times a day as needed for cough 354 mL 1    fluticasone (FLONASE) 50 mcg/act nasal spray 1 spray into each nostril daily 18 2 mL 1    fluticasone (Flovent HFA) 44 mcg/act inhaler Inhale 2 puffs 2 (two) times a day Rinse mouth after use  10 6 g 1    liraglutide (VICTOZA) injection Inject 0 1 mL (0 6 mg total) under the skin daily 3 mL 2    oxyCODONE (ROXICODONE) 30 MG immediate release tablet       tamsulosin (FLOMAX) 0 4 mg Take 1 capsule (0 4 mg total) by mouth daily with dinner 30 capsule 3    varenicline (CHANTIX JOSH) 0 5 MG X 11 & 1 MG X 42 tablet Take one 0 5 mg tablet by mouth once daily for 3 days, then one 0 5 mg tablet by mouth twice daily for 4 days, then one 1 mg tablet by mouth twice daily  (Patient not taking: Reported on 8/16/2021) 53 tablet 0    varenicline (CHANTIX) 0 5 mg tablet Take 1 tablet (0 5 mg total) by mouth 2 (two) times a day (Patient not taking: Reported on 8/16/2021) 60 tablet 0     No current facility-administered medications for this visit  Current Outpatient Medications on File Prior to Visit   Medication Sig    albuterol (PROVENTIL HFA,VENTOLIN HFA) 90 mcg/act inhaler Inhale 2 puffs every 6 (six) hours as needed for wheezing or shortness of breath    ALPRAZolam (XANAX) 0 25 mg tablet TAKE 1 TABLET BY MOUTH TWICE DAILY AS NEEDED FOR ANXIETY    glucose blood test strip 1 each by Other route 2 (two) times a day Use as instructed    Insulin Pen Needle (Pen Needles) 31G X 8 MM MISC Apply topically 4 (four) times a day    Lancets Misc   (Accu-Chek FastClix Lancet) KIT Inject under the skin 2 (two) times a day    metFORMIN (GLUCOPHAGE) 1000 MG tablet Take 1 tablet (1,000 mg total) by mouth 2 (two) times a day with meals    zolpidem (AMBIEN) 10 mg tablet Take 10 mg by mouth daily at bedtime as needed for sleep    [DISCONTINUED] amLODIPine (NORVASC) 10 mg tablet Take 1 tablet (10 mg total) by mouth daily    [DISCONTINUED] apixaban (Eliquis) 5 mg Take 1 tablet (5 mg total) by mouth 2 (two) times a day    [DISCONTINUED] insulin glargine (Lantus SoloStar) 100 units/mL injection pen Inject 28 Units under the skin daily at bedtime    [DISCONTINUED] losartan-hydrochlorothiazide (HYZAAR) 100-25 MG per tablet Take 1 tablet by mouth daily    [DISCONTINUED] pantoprazole (PROTONIX) 40 mg tablet Take 1 tablet (40 mg total) by mouth daily    [DISCONTINUED] PARoxetine (PAXIL) 20 mg tablet Take 1 tablet (20 mg total) by mouth daily    [DISCONTINUED] simvastatin (ZOCOR) 40 mg tablet Take 1 tablet (40 mg total) by mouth daily at bedtime    liraglutide (VICTOZA) injection Inject 0 1 mL (0 6 mg total) under the skin daily    oxyCODONE (ROXICODONE) 30 MG immediate release tablet     varenicline (CHANTIX JOSH) 0 5 MG X 11 & 1 MG X 42 tablet Take one 0 5 mg tablet by mouth once daily for 3 days, then one 0 5 mg tablet by mouth twice daily for 4 days, then one 1 mg tablet by mouth twice daily  (Patient not taking: Reported on 8/16/2021)    varenicline (CHANTIX) 0 5 mg tablet Take 1 tablet (0 5 mg total) by mouth 2 (two) times a day (Patient not taking: Reported on 8/16/2021)    [DISCONTINUED] amLODIPine (NORVASC) 5 mg tablet Take 1 tablet (5 mg total) by mouth daily (Patient not taking: Reported on 8/16/2021)     No current facility-administered medications on file prior to visit  He has No Known Allergies       Review of Systems   Constitutional: Negative for fatigue  HENT: Negative for congestion, rhinorrhea and sore throat  Eyes: Negative for visual disturbance  Respiratory: Positive for cough  Negative for wheezing  Cardiovascular: Negative for chest pain  Gastrointestinal: Negative for abdominal distention, abdominal pain, blood in stool, constipation, diarrhea, nausea and vomiting  Genitourinary: Positive for difficulty urinating  Negative for dysuria, frequency and hematuria  Musculoskeletal: Negative for arthralgias  Skin: Negative for rash  Neurological: Negative for dizziness and headaches  Objective:      /85 (BP Location: Left arm, Patient Position: Sitting, Cuff Size: Standard)   Pulse 93   Temp 97 6 °F (36 4 °C) (Temporal)   Resp 16   Ht 5' 6" (1 676 m)   Wt 81 6 kg (180 lb)   SpO2 97%   BMI 29 05 kg/m²          Physical Exam  Constitutional:       General: He is not in acute distress  Appearance: Normal appearance  He is well-developed and normal weight  He is not ill-appearing, toxic-appearing or diaphoretic  HENT:      Head: Normocephalic and atraumatic  Right Ear: External ear normal       Left Ear: External ear normal       Nose: Congestion present  No rhinorrhea  Mouth/Throat:      Mouth: Mucous membranes are moist       Pharynx: Posterior oropharyngeal erythema (mild) present  No oropharyngeal exudate  Comments: Edematous and erythematous   Eyes:      General: No scleral icterus  Right eye: No discharge  Left eye: No discharge  Extraocular Movements: Extraocular movements intact  Conjunctiva/sclera: Conjunctivae normal    Cardiovascular:      Rate and Rhythm: Normal rate and regular rhythm  Pulses: Normal pulses  Heart sounds: Normal heart sounds  No murmur heard  Pulmonary:      Effort: Pulmonary effort is normal  No respiratory distress  Breath sounds: Normal breath sounds  No stridor  No wheezing, rhonchi or rales  Comments: Coughing multiple times during office visit  Chest:      Chest wall: No tenderness  Abdominal:      General: Abdomen is flat  Bowel sounds are normal  There is no distension  Palpations: Abdomen is soft  Tenderness: There is no abdominal tenderness  Musculoskeletal:         General: No swelling or tenderness  Normal range of motion  Cervical back: Normal range of motion and neck supple  No rigidity  No muscular tenderness        Right lower leg: No edema  Left lower leg: No edema  Lymphadenopathy:      Cervical: No cervical adenopathy  Skin:     General: Skin is warm and dry  Coloration: Skin is not jaundiced  Neurological:      General: No focal deficit present  Mental Status: He is alert and oriented to person, place, and time  Cranial Nerves: No cranial nerve deficit  Motor: No weakness     Psychiatric:         Mood and Affect: Mood normal

## 2021-08-16 NOTE — ASSESSMENT & PLAN NOTE
Heart rate regular with a rate of 93 bpm   Not taking eliquis due to previous insurance issues  ChadsVasc of 2   - Restart Eliquis  - Referral to cardiology

## 2021-08-16 NOTE — ASSESSMENT & PLAN NOTE
Lab Results   Component Value Date    HGBA1C 9 9 (A) 08/16/2021       Uncontrolled, worsening since last office visit (8 4)  Goal <7 0  Patient admits to running out of medications therefore glucose was uncontrolled  Current medications that include Lantus 28 units HS, and metformin 1000 mg twice daily   - Increase lantus to 35 Units HS   - Diabetic diet

## 2021-08-16 NOTE — ASSESSMENT & PLAN NOTE
History of prostate pathology in the past but unclear of the diagnosis  Patient has incomplete emptying   - Order PSA  - Flomax ordered

## 2021-11-15 ENCOUNTER — OFFICE VISIT (OUTPATIENT)
Dept: FAMILY MEDICINE CLINIC | Facility: CLINIC | Age: 61
End: 2021-11-15

## 2021-11-15 VITALS
RESPIRATION RATE: 18 BRPM | WEIGHT: 179.1 LBS | SYSTOLIC BLOOD PRESSURE: 136 MMHG | OXYGEN SATURATION: 98 % | HEART RATE: 84 BPM | BODY MASS INDEX: 28.78 KG/M2 | DIASTOLIC BLOOD PRESSURE: 88 MMHG | TEMPERATURE: 97.3 F | HEIGHT: 66 IN

## 2021-11-15 DIAGNOSIS — Z23 ENCOUNTER FOR IMMUNIZATION: ICD-10-CM

## 2021-11-15 DIAGNOSIS — I10 ESSENTIAL HYPERTENSION: ICD-10-CM

## 2021-11-15 DIAGNOSIS — I48.0 PAROXYSMAL A-FIB (HCC): ICD-10-CM

## 2021-11-15 DIAGNOSIS — Z79.4 TYPE 2 DIABETES MELLITUS WITHOUT COMPLICATION, WITH LONG-TERM CURRENT USE OF INSULIN (HCC): Primary | ICD-10-CM

## 2021-11-15 DIAGNOSIS — Z59.89 INSURANCE COVERAGE PROBLEMS: ICD-10-CM

## 2021-11-15 DIAGNOSIS — E11.9 TYPE 2 DIABETES MELLITUS WITHOUT COMPLICATION, WITH LONG-TERM CURRENT USE OF INSULIN (HCC): Primary | ICD-10-CM

## 2021-11-15 LAB — SL AMB POCT HEMOGLOBIN AIC: 9.5 (ref ?–6.5)

## 2021-11-15 PROCEDURE — 83036 HEMOGLOBIN GLYCOSYLATED A1C: CPT | Performed by: FAMILY MEDICINE

## 2021-11-15 PROCEDURE — 90682 RIV4 VACC RECOMBINANT DNA IM: CPT | Performed by: FAMILY MEDICINE

## 2021-11-15 PROCEDURE — 99213 OFFICE O/P EST LOW 20 MIN: CPT | Performed by: FAMILY MEDICINE

## 2021-11-15 PROCEDURE — G0008 ADMIN INFLUENZA VIRUS VAC: HCPCS | Performed by: FAMILY MEDICINE

## 2021-11-15 RX ORDER — AMLODIPINE BESYLATE 10 MG/1
10 TABLET ORAL DAILY
Qty: 90 TABLET | Refills: 1 | Status: SHIPPED | OUTPATIENT
Start: 2021-11-15

## 2021-11-15 RX ORDER — GLIPIZIDE 5 MG/1
5 TABLET ORAL
Qty: 90 TABLET | Refills: 1 | Status: SHIPPED | OUTPATIENT
Start: 2021-11-15 | End: 2021-11-15

## 2021-11-15 RX ORDER — LOSARTAN POTASSIUM AND HYDROCHLOROTHIAZIDE 12.5; 5 MG/1; MG/1
2 TABLET ORAL DAILY
Qty: 90 TABLET | Refills: 1 | Status: SHIPPED | OUTPATIENT
Start: 2021-11-15

## 2021-11-15 RX ORDER — GLIPIZIDE 5 MG/1
5 TABLET ORAL
Qty: 90 TABLET | Refills: 1 | Status: SHIPPED | OUTPATIENT
Start: 2021-11-15

## 2021-11-15 SDOH — ECONOMIC STABILITY - INCOME SECURITY: OTHER PROBLEMS RELATED TO HOUSING AND ECONOMIC CIRCUMSTANCES: Z59.89

## 2021-11-16 ENCOUNTER — PATIENT OUTREACH (OUTPATIENT)
Dept: FAMILY MEDICINE CLINIC | Facility: CLINIC | Age: 61
End: 2021-11-16

## 2022-02-06 ENCOUNTER — HOSPITAL ENCOUNTER (EMERGENCY)
Facility: HOSPITAL | Age: 62
Discharge: HOME | End: 2022-02-06
Attending: EMERGENCY MEDICINE | Admitting: EMERGENCY MEDICINE

## 2022-02-06 ENCOUNTER — APPOINTMENT (EMERGENCY)
Dept: RADIOLOGY | Facility: HOSPITAL | Age: 62
End: 2022-02-06

## 2022-02-06 VITALS
OXYGEN SATURATION: 97 % | BODY MASS INDEX: 29.25 KG/M2 | TEMPERATURE: 98.8 F | RESPIRATION RATE: 26 BRPM | WEIGHT: 181.22 LBS | DIASTOLIC BLOOD PRESSURE: 75 MMHG | HEART RATE: 92 BPM | SYSTOLIC BLOOD PRESSURE: 115 MMHG

## 2022-02-06 DIAGNOSIS — J40 BRONCHITIS: Primary | ICD-10-CM

## 2022-02-06 DIAGNOSIS — R05.3 CHRONIC COUGH: ICD-10-CM

## 2022-02-06 LAB
ALBUMIN SERPL BCP-MCNC: 4.1 G/DL (ref 3.5–5)
ALP SERPL-CCNC: 80 U/L (ref 46–116)
ALT SERPL W P-5'-P-CCNC: 23 U/L (ref 12–78)
ANION GAP SERPL CALCULATED.3IONS-SCNC: 7 MMOL/L (ref 4–13)
AST SERPL W P-5'-P-CCNC: 19 U/L (ref 5–45)
BASOPHILS # BLD AUTO: 0.06 THOUSANDS/ΜL (ref 0–0.1)
BASOPHILS NFR BLD AUTO: 0 % (ref 0–1)
BILIRUB SERPL-MCNC: 0.58 MG/DL (ref 0.2–1)
BUN SERPL-MCNC: 15 MG/DL (ref 5–25)
CALCIUM SERPL-MCNC: 8.7 MG/DL (ref 8.3–10.1)
CARDIAC TROPONIN I PNL SERPL HS: 4 NG/L
CHLORIDE SERPL-SCNC: 100 MMOL/L (ref 100–108)
CO2 SERPL-SCNC: 30 MMOL/L (ref 21–32)
CREAT SERPL-MCNC: 1.13 MG/DL (ref 0.6–1.3)
EOSINOPHIL # BLD AUTO: 0.16 THOUSAND/ΜL (ref 0–0.61)
EOSINOPHIL NFR BLD AUTO: 1 % (ref 0–6)
ERYTHROCYTE [DISTWIDTH] IN BLOOD BY AUTOMATED COUNT: 12.7 % (ref 11.6–15.1)
GFR SERPL CREATININE-BSD FRML MDRD: 70 ML/MIN/1.73SQ M
GLUCOSE SERPL-MCNC: 213 MG/DL (ref 65–140)
HCT VFR BLD AUTO: 39.7 % (ref 36.5–49.3)
HGB BLD-MCNC: 13.4 G/DL (ref 12–17)
IMM GRANULOCYTES # BLD AUTO: 0.05 THOUSAND/UL (ref 0–0.2)
IMM GRANULOCYTES NFR BLD AUTO: 0 % (ref 0–2)
LYMPHOCYTES # BLD AUTO: 2.17 THOUSANDS/ΜL (ref 0.6–4.47)
LYMPHOCYTES NFR BLD AUTO: 16 % (ref 14–44)
MCH RBC QN AUTO: 30.7 PG (ref 26.8–34.3)
MCHC RBC AUTO-ENTMCNC: 33.8 G/DL (ref 31.4–37.4)
MCV RBC AUTO: 91 FL (ref 82–98)
MONOCYTES # BLD AUTO: 0.95 THOUSAND/ΜL (ref 0.17–1.22)
MONOCYTES NFR BLD AUTO: 7 % (ref 4–12)
NEUTROPHILS # BLD AUTO: 10.16 THOUSANDS/ΜL (ref 1.85–7.62)
NEUTS SEG NFR BLD AUTO: 76 % (ref 43–75)
NRBC BLD AUTO-RTO: 0 /100 WBCS
PLATELET # BLD AUTO: 284 THOUSANDS/UL (ref 149–390)
PMV BLD AUTO: 10.5 FL (ref 8.9–12.7)
POTASSIUM SERPL-SCNC: 3.5 MMOL/L (ref 3.5–5.3)
PROT SERPL-MCNC: 8.2 G/DL (ref 6.4–8.2)
RBC # BLD AUTO: 4.37 MILLION/UL (ref 3.88–5.62)
SODIUM SERPL-SCNC: 137 MMOL/L (ref 136–145)
WBC # BLD AUTO: 13.55 THOUSAND/UL (ref 4.31–10.16)

## 2022-02-06 PROCEDURE — 71046 X-RAY EXAM CHEST 2 VIEWS: CPT

## 2022-02-06 PROCEDURE — 94640 AIRWAY INHALATION TREATMENT: CPT

## 2022-02-06 PROCEDURE — 36415 COLL VENOUS BLD VENIPUNCTURE: CPT | Performed by: PHYSICIAN ASSISTANT

## 2022-02-06 PROCEDURE — 99285 EMERGENCY DEPT VISIT HI MDM: CPT | Performed by: PHYSICIAN ASSISTANT

## 2022-02-06 PROCEDURE — 93005 ELECTROCARDIOGRAM TRACING: CPT

## 2022-02-06 PROCEDURE — 80053 COMPREHEN METABOLIC PANEL: CPT | Performed by: PHYSICIAN ASSISTANT

## 2022-02-06 PROCEDURE — 85025 COMPLETE CBC W/AUTO DIFF WBC: CPT | Performed by: PHYSICIAN ASSISTANT

## 2022-02-06 PROCEDURE — 99285 EMERGENCY DEPT VISIT HI MDM: CPT

## 2022-02-06 PROCEDURE — 84484 ASSAY OF TROPONIN QUANT: CPT | Performed by: PHYSICIAN ASSISTANT

## 2022-02-06 RX ORDER — ALBUTEROL SULFATE 90 UG/1
2 AEROSOL, METERED RESPIRATORY (INHALATION) EVERY 6 HOURS PRN
Qty: 18 G | Refills: 0 | Status: SHIPPED | OUTPATIENT
Start: 2022-02-06 | End: 2022-02-20

## 2022-02-06 RX ORDER — BENZONATATE 100 MG/1
100 CAPSULE ORAL EVERY 8 HOURS
Qty: 21 CAPSULE | Refills: 0 | Status: SHIPPED | OUTPATIENT
Start: 2022-02-06

## 2022-02-06 RX ORDER — BENZONATATE 100 MG/1
100 CAPSULE ORAL ONCE
Status: COMPLETED | OUTPATIENT
Start: 2022-02-06 | End: 2022-02-06

## 2022-02-06 RX ORDER — ALBUTEROL SULFATE 2.5 MG/3ML
2.5 SOLUTION RESPIRATORY (INHALATION) ONCE
Status: COMPLETED | OUTPATIENT
Start: 2022-02-06 | End: 2022-02-06

## 2022-02-06 RX ORDER — ALBUTEROL SULFATE 90 UG/1
2 AEROSOL, METERED RESPIRATORY (INHALATION) ONCE
Status: COMPLETED | OUTPATIENT
Start: 2022-02-06 | End: 2022-02-06

## 2022-02-06 RX ADMIN — BENZONATATE 100 MG: 100 CAPSULE ORAL at 20:25

## 2022-02-06 RX ADMIN — ALBUTEROL SULFATE 2.5 MG: 2.5 SOLUTION RESPIRATORY (INHALATION) at 20:26

## 2022-02-06 RX ADMIN — ALBUTEROL SULFATE 2 PUFF: 90 AEROSOL, METERED RESPIRATORY (INHALATION) at 21:56

## 2022-02-06 RX ADMIN — IPRATROPIUM BROMIDE 0.5 MG: 0.5 SOLUTION RESPIRATORY (INHALATION) at 20:26

## 2022-02-07 LAB
ATRIAL RATE: 90 BPM
P AXIS: 68 DEGREES
PR INTERVAL: 174 MS
QRS AXIS: 72 DEGREES
QRSD INTERVAL: 94 MS
QT INTERVAL: 336 MS
QTC INTERVAL: 411 MS
T WAVE AXIS: 41 DEGREES
VENTRICULAR RATE: 90 BPM

## 2022-02-07 PROCEDURE — 93010 ELECTROCARDIOGRAM REPORT: CPT | Performed by: INTERNAL MEDICINE

## 2022-02-07 NOTE — ED PROVIDER NOTES
History  Chief Complaint   Patient presents with    Cough     Pt reports dry cough x2 weeks reports now causing him to feel SOB   Shortness of Breath     Monica Cunningham is a 62 y o   male with PMH of hypertension, hyperlipidemia, insulin-dependent diabetes mellitus, depression, paroxysmal AFib on Eliquis who presents to the emergency department with cough  The patient states the last 5 months he has had a persistent dry cough  He states the last 2 weeks cough has become progressively worse and today he was not able to sleep secondary to the cough  He states he told his primary care physician about this problem passing was prescribed extremity source for him as well as benzonatate which did not really work  He states he has not tried any medications today  At times cough is productive but is not currently productive  Symptoms are constant and present all times but due worsened with activity  Denies any chest pain or shortness of breath  Does admit to some posttussive emesis at times  Patient admits to approximately 20 cigarettes a day  Patient denies any other symptoms including headache, dizziness, lightheadedness, chest pain, palpitations, fevers, chills, abdominal pain, nausea, , diarrhea, lower extremity pain swelling or edema              History provided by:  Patient   used: No    Cough  Cough characteristics:  Non-productive  Sputum characteristics:  Nondescript  Severity:  Moderate  Onset quality:  Gradual  Duration:  5 weeks  Timing:  Constant  Progression:  Worsening  Chronicity:  New  Smoker: yes    Context: not animal exposure, not exposure to allergens, not fumes, not occupational exposure, not sick contacts, not smoke exposure, not upper respiratory infection, not weather changes and not with activity    Relieved by:  Nothing  Worsened by:  Nothing  Ineffective treatments:  None tried  Associated symptoms: no chest pain, no chills, no ear fullness, no eye discharge, no fever, no headaches, no myalgias, no rash, no rhinorrhea, no shortness of breath, no sinus congestion, no sore throat, no weight loss and no wheezing    Risk factors: no chemical exposure, no recent infection and no recent travel        Prior to Admission Medications   Prescriptions Last Dose Informant Patient Reported? Taking? ALPRAZolam (XANAX) 0 25 mg tablet   No No   Sig: TAKE 1 TABLET BY MOUTH TWICE DAILY AS NEEDED FOR ANXIETY   Insulin Pen Needle (Pen Needles) 31G X 8 MM MISC   No No   Sig: Apply topically 4 (four) times a day   Lancets Misc  (Accu-Chek FastClix Lancet) KIT   No No   Sig: Inject under the skin 2 (two) times a day   PARoxetine (PAXIL) 20 mg tablet   No No   Sig: Take 1 tablet (20 mg total) by mouth daily   albuterol (PROVENTIL HFA,VENTOLIN HFA) 90 mcg/act inhaler   No No   Sig: Inhale 2 puffs every 6 (six) hours as needed for wheezing or shortness of breath   amLODIPine (NORVASC) 10 mg tablet   No No   Sig: Take 1 tablet (10 mg total) by mouth daily   apixaban (Eliquis) 5 mg   No No   Sig: Take 1 tablet (5 mg total) by mouth 2 (two) times a day   benzonatate (TESSALON PERLES) 100 mg capsule   No No   Sig: Take 1 capsule (100 mg total) by mouth 3 (three) times a day as needed for cough   Patient not taking: Reported on 11/15/2021    dextromethorphan-guaiFENesin (ROBITUSSIN DM)  mg/5 mL syrup   No No   Sig: Take 5 mL by mouth 3 (three) times a day as needed for cough   fluticasone (FLONASE) 50 mcg/act nasal spray   No No   Si spray into each nostril daily   fluticasone (Flovent HFA) 44 mcg/act inhaler   No No   Sig: Inhale 2 puffs 2 (two) times a day Rinse mouth after use     glipiZIDE (GLUCOTROL) 5 mg tablet   No No   Sig: Take 1 tablet (5 mg total) by mouth 2 (two) times a day before meals   glucose blood test strip   No No   Si each by Other route 2 (two) times a day Use as instructed   insulin glargine (Lantus SoloStar) 100 units/mL injection pen   No No   Sig: Inject 35 Units under the skin daily at bedtime   liraglutide (VICTOZA) injection   No No   Sig: Inject 0 1 mL (0 6 mg total) under the skin daily   losartan-hydrochlorothiazide (HYZAAR) 100-25 MG per tablet   No No   Sig: Take 1 tablet by mouth daily   losartan-hydrochlorothiazide (HYZAAR) 50-12 5 mg per tablet   No No   Sig: Take 2 tablets by mouth daily   metFORMIN (GLUCOPHAGE) 1000 MG tablet   No No   Sig: Take 1 tablet (1,000 mg total) by mouth 2 (two) times a day with meals   oxyCODONE (ROXICODONE) 30 MG immediate release tablet   Yes No   pantoprazole (PROTONIX) 40 mg tablet   No No   Sig: Take 1 tablet (40 mg total) by mouth daily   simvastatin (ZOCOR) 40 mg tablet   No No   Sig: Take 1 tablet (40 mg total) by mouth daily at bedtime   tamsulosin (FLOMAX) 0 4 mg   No No   Sig: Take 1 capsule (0 4 mg total) by mouth daily with dinner   varenicline (CHANTIX JOSH) 0 5 MG X 11 & 1 MG X 42 tablet   No No   Sig: Take one 0 5 mg tablet by mouth once daily for 3 days, then one 0 5 mg tablet by mouth twice daily for 4 days, then one 1 mg tablet by mouth twice daily  Patient not taking: Reported on 8/16/2021   varenicline (CHANTIX) 0 5 mg tablet   No No   Sig: Take 1 tablet (0 5 mg total) by mouth 2 (two) times a day   Patient not taking: Reported on 8/16/2021   zolpidem (AMBIEN) 10 mg tablet   Yes No   Sig: Take 10 mg by mouth daily at bedtime as needed for sleep      Facility-Administered Medications: None       Past Medical History:   Diagnosis Date    Depression     Diabetes mellitus ([facility] [address] )     Hyperlipidemia     Hypertension     Tobacco abuse        Past Surgical History:   Procedure Laterality Date    APPENDECTOMY      SHOULDER ARTHROSCOPY         History reviewed  No pertinent family history  I have reviewed and agree with the history as documented      E-Cigarette/Vaping     E-Cigarette/Vaping Substances     Social History     Tobacco Use    Smoking status: Current Every Day Smoker     Packs/day: 1 00 Types: Cigarettes    Smokeless tobacco: Current User   Substance Use Topics    Alcohol use: No    Drug use: No       Review of Systems   Constitutional: Negative for activity change, appetite change, chills, fever, unexpected weight change and weight loss  HENT: Negative for congestion, ear discharge, postnasal drip, rhinorrhea, sinus pressure, sinus pain and sore throat  Eyes: Negative for discharge  Respiratory: Positive for cough  Negative for apnea, choking, chest tightness, shortness of breath, wheezing and stridor  Cardiovascular: Negative for chest pain, palpitations and leg swelling  Gastrointestinal: Negative for abdominal pain, blood in stool, constipation, diarrhea and nausea  Genitourinary: Negative for dysuria, flank pain, frequency and urgency  Musculoskeletal: Negative for arthralgias, myalgias and neck stiffness  Skin: Negative for color change, pallor, rash and wound  Neurological: Negative for dizziness, tremors, seizures, syncope, facial asymmetry, light-headedness, numbness and headaches  All other systems reviewed and are negative  Physical Exam  Physical Exam  Vitals and nursing note reviewed  Constitutional:       General: He is not in acute distress  Appearance: Normal appearance  He is normal weight  He is not ill-appearing or toxic-appearing  HENT:      Head: Normocephalic and atraumatic  Nose: Nose normal       Mouth/Throat:      Mouth: Mucous membranes are moist       Pharynx: Oropharynx is clear  No oropharyngeal exudate  Eyes:      Pupils: Pupils are equal, round, and reactive to light  Cardiovascular:      Rate and Rhythm: Normal rate and regular rhythm  Pulses: Normal pulses  Heart sounds: Normal heart sounds  No murmur heard  No friction rub  No gallop  Pulmonary:      Effort: Pulmonary effort is normal  No respiratory distress  Breath sounds: No stridor, decreased air movement or transmitted upper airway sounds  Examination of the right-upper field reveals wheezing  Examination of the left-upper field reveals wheezing  Examination of the left-middle field reveals wheezing  Wheezing present  No decreased breath sounds, rhonchi or rales  Comments: Mild expiratory wheeze diffusely  Dry cough present  Abdominal:      General: Abdomen is flat  Bowel sounds are normal  There is no distension  Palpations: Abdomen is soft  There is no mass  Tenderness: There is no abdominal tenderness  There is no guarding or rebound  Hernia: No hernia is present  Musculoskeletal:         General: No swelling, tenderness or deformity  Normal range of motion  Cervical back: Normal range of motion  No rigidity or tenderness  Skin:     General: Skin is warm and dry  Capillary Refill: Capillary refill takes less than 2 seconds  Neurological:      General: No focal deficit present  Mental Status: He is alert and oriented to person, place, and time  Mental status is at baseline  Cranial Nerves: No cranial nerve deficit  Sensory: No sensory deficit  Motor: No weakness           Vital Signs  ED Triage Vitals   Temperature Pulse Respirations Blood Pressure SpO2   02/06/22 1910 02/06/22 1911 02/06/22 1911 02/06/22 1911 02/06/22 1911   98 8 °F (37 1 °C) 95 18 131/62 98 %      Temp Source Heart Rate Source Patient Position - Orthostatic VS BP Location FiO2 (%)   02/06/22 1910 02/06/22 1911 02/06/22 1911 02/06/22 1911 --   Oral Monitor Sitting Right arm       Pain Score       02/06/22 2128       No Pain           Vitals:    02/06/22 1911 02/06/22 2128   BP: 131/62 115/75   Pulse: 95 92   Patient Position - Orthostatic VS: Sitting Lying         Visual Acuity      ED Medications  Medications   albuterol inhalation solution 2 5 mg (2 5 mg Nebulization Given 2/6/22 2026)   ipratropium (ATROVENT) 0 02 % inhalation solution 0 5 mg (0 5 mg Nebulization Given 2/6/22 2026)   benzonatate (TESSALON PERLES) capsule 100 mg (100 mg Oral Given 2/6/22 2025)   albuterol (PROVENTIL HFA,VENTOLIN HFA) inhaler 2 puff (2 puffs Inhalation Given 2/6/22 2156)       Diagnostic Studies  Results Reviewed     Procedure Component Value Units Date/Time    HS Troponin 0hr (reflex protocol) [[id number]]  (Normal) Collected: 02/06/22 2004    Lab Status: Final result Specimen: Blood from Arm, Right Updated: 02/06/22 2034     hs TnI 0hr 4 ng/L     Comprehensive metabolic panel [[id number]]  (Abnormal) Collected: 02/06/22 2004    Lab Status: Final result Specimen: Blood from Arm, Right Updated: 02/06/22 2029     Sodium 137 mmol/L      Potassium 3 5 mmol/L      Chloride 100 mmol/L      CO2 30 mmol/L      ANION GAP 7 mmol/L      BUN 15 mg/dL      Creatinine 1 13 mg/dL      Glucose 213 mg/dL      Calcium 8 7 mg/dL      AST 19 U/L      ALT 23 U/L      Alkaline Phosphatase 80 U/L      Total Protein 8 2 g/dL      Albumin 4 1 g/dL      Total Bilirubin 0 58 mg/dL      eGFR 70 ml/min/1 73sq m     Narrative:      [facility] guidelines for Chronic Kidney Disease (CKD):     Stage 1 with normal or high GFR (GFR > 90 mL/min/1 73 square meters)    Stage 2 Mild CKD (GFR = 60-89 mL/min/1 73 square meters)    Stage 3A Moderate CKD (GFR = 45-59 mL/min/1 73 square meters)    Stage 3B Moderate CKD (GFR = 30-44 mL/min/1 73 square meters)    Stage 4 Severe CKD (GFR = 15-29 mL/min/1 73 square meters)    Stage 5 End Stage CKD (GFR <15 mL/min/1 73 square meters)  Note: GFR calculation is accurate only with a steady state creatinine    CBC and differential [[id number]]  (Abnormal) Collected: 02/06/22 2004    Lab Status: Final result Specimen: Blood from Arm, Right Updated: 02/06/22 2008     WBC 13 55 Thousand/uL      RBC 4 37 Million/uL      Hemoglobin 13 4 g/dL      Hematocrit 39 7 %      MCV 91 fL      MCH 30 7 pg      MCHC 33 8 g/dL      RDW 12 7 %      MPV 10 5 fL      Platelets 105 Thousands/uL      nRBC 0 /100 WBCs      Neutrophils Relative 76 % Immat GRANS % 0 %      Lymphocytes Relative 16 %      Monocytes Relative 7 %      Eosinophils Relative 1 %      Basophils Relative 0 %      Neutrophils Absolute 10 16 Thousands/µL      Immature Grans Absolute 0 05 Thousand/uL      Lymphocytes Absolute 2 17 Thousands/µL      Monocytes Absolute 0 95 Thousand/µL      Eosinophils Absolute 0 16 Thousand/µL      Basophils Absolute 0 06 Thousands/µL                  XR chest 2 views   ED Interpretation by Ysabel Lewis PA-C (02/06 2035)   No infiltrate, cardiac silhouette normal, no pleural effusions or pulmonary edema  Procedures  ECG 12 Lead Documentation Only    Date/Time: 2/6/2022 8:25 PM  Performed by: Ysabel Lewis PA-C  Authorized by: Ysabel Lewis PA-C     Indications / Diagnosis:  Cough  ECG reviewed by me, the ED Provider: yes    Patient location:  ED  Previous ECG:     Previous ECG:  Unavailable  Interpretation:     Interpretation: non-specific    Rate:     ECG rate:  90    ECG rate assessment: normal    Rhythm:     Rhythm: sinus rhythm    Ectopy:     Ectopy: PAC    QRS:     QRS axis:  Normal    QRS intervals:  Normal  Conduction:     Conduction: normal    ST segments:     ST segments:  Normal  T waves:     T waves: normal    Comments:        EKG Interpretation:  EKG as interpreted by me  Rate: 90  Rhythm: NSR with PAC  ST/T abnormalities: None   Axis: Normal  QRS Width: 94  QTc: 411      EKG does not suggest:  Ischemia (IRON, or DeWinters T waves)    Wellens (symetrically deeply inverted T waves in V2 and V3 or Biphasic T waves in V2, V3)  Heart block  ARVD (epsilon wave, large TWI, localized qrs widening of 110ms in V1-V3, paroxysmal episodes of VT, prolonged s wave upstroke in v1-v3)  HOCM (massive TWI, dagger Qs in lateral/inferior leads)  Arrhythmia  WPW (delta wave)  Long QT syndrome from drugs such as TCAs, fluconazole, Reglan, methadone, SSRIs etc  Short QT  Brugada (coved st elevation with TWI, saddleback IRON or a small amount of IRON)               ED Course                               SBIRT 22yo+      Most Recent Value   SBIRT (24 yo +)    In order to provide better care to our patients, we are screening all of our patients for alcohol and drug use  Would it be okay to ask you these screening questions? Yes Filed at: 02/06/2022 1920   Initial Alcohol Screen: US AUDIT-C     1  How often do you have a drink containing alcohol? 0 Filed at: 02/06/2022 1920   2  How many drinks containing alcohol do you have on a typical day you are drinking? 0 Filed at: 02/06/2022 1920   3a  Male UNDER 65: How often do you have five or more drinks on one occasion? 0 Filed at: 02/06/2022 1920   3b  FEMALE Any Age, or MALE 65+: How often do you have 4 or more drinks on one occassion? 0 Filed at: 02/06/2022 1920   Audit-C Score 0 Filed at: 02/06/2022 1920   COLLETTE: How many times in the past year have you    Used an illegal drug or used a prescription medication for non-medical reasons? Never Filed at: 02/06/2022 1920                    MDM  Number of Diagnoses or Management Options  Bronchitis: new and requires workup  Chronic cough: established and worsening  Diagnosis management comments: Patient was seen and examined  in the emergency department for chief complaint of cough  The patient presented approximately 5 months of cough that has worsened over last 2 weeks and the patient has been unable to sleep for the last 2 days due to cough  Chronic smoker of 1 pack per day  No diagnosis of asthma or COPD  He is on losartan  Otherwise no other chest pain, shortness of breath or palpitations  Does admit to a few episodes of posttussive emesis when he coughs very hard  No fevers, chills, dizziness, lightheadedness  On exam patient is in no acute distress and does have a dry nonproductive cough  Expiratory wheezes present  Regular rate rhythm, no murmurs rubs or gallops  Abdomen soft nontender nondistended    No lower extremity edema      DDx including but not limited to:  URI, bronchitis, pneumonia, GERD, aspiration pneumonitis, viral illness, COVID 19, smoke inhalation, adverse medication reaction  Workup: Will obtain CBC, CMP, troponin EKG, chest x-ray  Will attempt to treat symptomatically    Workup is generally reassuring  No acute changes on EKG or chest x-ray  No delta due to like the symptoms  At this point differential remains broad the likely nonacute process  In setting worsening cough diagnosis bronchitis and given albuterol inhaler  Instructed to follow-up PCP for further workup and management including questions regarding losartan  Continue benzonatate and albuterol inhaler as needed  Other over-the-counter medications as needed  Disposition:  General impression 52-year-old female with bronchitis  Treated symptomatically here and felt much better prior to discharge  Albuterol script sent given inhaler here  Benzonatate sent to the pharmacy as as previous script was completed  The patient was discharged in stable condition  Patient ambulated off the department  Extensive return to emergency department precautions were discussed  Follow up with appropriate providers including primary care physician was discussed  Patient and/or their  primary decision maker expressed understanding  Patient remained stable during entire emergency department stay           Amount and/or Complexity of Data Reviewed  Clinical lab tests: ordered and reviewed  Tests in the radiology section of CPT®: ordered and reviewed  Tests in the medicine section of CPT®: ordered and reviewed  Review and summarize past medical records: yes  Independent visualization of images, tracings, or specimens: yes    Risk of Complications, Morbidity, and/or Mortality  Presenting problems: moderate  Diagnostic procedures: moderate  Management options: moderate    Patient Progress  Patient progress: stable      Disposition  Final diagnoses:   Bronchitis Chronic cough     Time reflects when diagnosis was documented in both MDM as applicable and the Disposition within this note     Time User Action Codes Description Comment    2/6/2022  9:44 PM Delaney Basilio Add [J40] Bronchitis     2/6/2022  9:44 PM Delaney Basilio Add [R05 3] Chronic cough       ED Disposition     ED Disposition Condition Date/Time Comment    Discharge Stable Sun Feb 6, 2022  9:44 PM Gulshan Dentanshuljustin discharge to home/self care  Follow-up Information     Follow up With Specialties Details Why [address] [facility] Emergency Department Emergency Medicine Go to  As needed, If symptoms worsen [facility] [id number]  [address] [facility] Emergency Department, [address] [facility] , [place], [place], [facility] [address]  Call  To schedule an appointment with a primary care physician [phone]             Discharge Medication List as of 2/6/2022  9:48 PM      START taking these medications    Details   !! albuterol (PROVENTIL HFA,VENTOLIN HFA) 90 mcg/act inhaler Inhale 2 puffs every 6 (six) hours as needed for wheezing or shortness of breath for up to 14 days, Starting Sun 2/6/2022, Until Sun 2/20/2022 at 2359, Print      !! benzonatate (TESSALON PERLES) 100 mg capsule Take 1 capsule (100 mg total) by mouth every 8 (eight) hours, Starting Sun 2/6/2022, Normal       !! - Potential duplicate medications found  Please discuss with provider        CONTINUE these medications which have NOT CHANGED    Details   !! albuterol (PROVENTIL HFA,VENTOLIN HFA) 90 mcg/act inhaler Inhale 2 puffs every 6 (six) hours as needed for wheezing or shortness of breath, Starting Mon 11/9/2020, Normal      ALPRAZolam (XANAX) 0 25 mg tablet TAKE 1 TABLET BY MOUTH TWICE DAILY AS NEEDED FOR ANXIETY, Normal      amLODIPine (NORVASC) 10 mg tablet Take 1 tablet (10 mg total) by mouth daily, Starting Mon 11/15/2021, Normal apixaban (Eliquis) 5 mg Take 1 tablet (5 mg total) by mouth 2 (two) times a day, Starting Mon 8/16/2021, Normal      !! benzonatate (TESSALON PERLES) 100 mg capsule Take 1 capsule (100 mg total) by mouth 3 (three) times a day as needed for cough, Starting Mon 8/16/2021, Normal      dextromethorphan-guaiFENesin (ROBITUSSIN DM)  mg/5 mL syrup Take 5 mL by mouth 3 (three) times a day as needed for cough, Starting Mon 8/16/2021, Normal      fluticasone (FLONASE) 50 mcg/act nasal spray 1 spray into each nostril daily, Starting Mon 8/16/2021, Normal      fluticasone (Flovent HFA) 44 mcg/act inhaler Inhale 2 puffs 2 (two) times a day Rinse mouth after use , Starting Mon 8/16/2021, Normal      glipiZIDE (GLUCOTROL) 5 mg tablet Take 1 tablet (5 mg total) by mouth 2 (two) times a day before meals, Starting Mon 11/15/2021, Normal      glucose blood test strip 1 each by Other route 2 (two) times a day Use as instructed, Starting Mon 11/9/2020, Normal      insulin glargine (Lantus SoloStar) 100 units/mL injection pen Inject 35 Units under the skin daily at bedtime, Starting Mon 8/16/2021, Normal      Insulin Pen Needle (Pen Needles) 31G X 8 MM MISC Apply topically 4 (four) times a day, Starting Mon 11/9/2020, Normal      Lancets Misc   (Accu-Chek FastClix Lancet) KIT Inject under the skin 2 (two) times a day, Starting Mon 11/9/2020, Normal      liraglutide (VICTOZA) injection Inject 0 1 mL (0 6 mg total) under the skin daily, Starting Mon 9/16/2019, Until Wed 10/16/2019, Normal      losartan-hydrochlorothiazide (HYZAAR) 100-25 MG per tablet Take 1 tablet by mouth daily, Starting Mon 8/16/2021, Normal      losartan-hydrochlorothiazide (HYZAAR) 50-12 5 mg per tablet Take 2 tablets by mouth daily, Starting Mon 11/15/2021, Normal      metFORMIN (GLUCOPHAGE) 1000 MG tablet Take 1 tablet (1,000 mg total) by mouth 2 (two) times a day with meals, Starting Mon 11/15/2021, Normal      oxyCODONE (ROXICODONE) 30 MG immediate release tablet Starting Sun 11/1/2020, Historical Med      pantoprazole (PROTONIX) 40 mg tablet Take 1 tablet (40 mg total) by mouth daily, Starting Mon 8/16/2021, Normal      PARoxetine (PAXIL) 20 mg tablet Take 1 tablet (20 mg total) by mouth daily, Starting Mon 8/16/2021, Normal      simvastatin (ZOCOR) 40 mg tablet Take 1 tablet (40 mg total) by mouth daily at bedtime, Starting Mon 8/16/2021, Normal      tamsulosin (FLOMAX) 0 4 mg Take 1 capsule (0 4 mg total) by mouth daily with dinner, Starting Mon 8/16/2021, Normal      varenicline (CHANTIX JOSH) 0 5 MG X 11 & 1 MG X 42 tablet Take one 0 5 mg tablet by mouth once daily for 3 days, then one 0 5 mg tablet by mouth twice daily for 4 days, then one 1 mg tablet by mouth twice daily  , Normal      varenicline (CHANTIX) 0 5 mg tablet Take 1 tablet (0 5 mg total) by mouth 2 (two) times a day, Starting Mon 11/9/2020, Normal      zolpidem (AMBIEN) 10 mg tablet Take 10 mg by mouth daily at bedtime as needed for sleep, Historical Med       !! - Potential duplicate medications found  Please discuss with provider  No discharge procedures on file      PDMP Review     None          ED Provider  Electronically Signed by           Oswaldo Wilks PA-C  02/06/22 8130

## 2022-02-07 NOTE — DISCHARGE INSTRUCTIONS
You were seen in the emergency department today for cough  Laboratory analysis, EKG, and Radiologic imaging did not reveal any acute abnormalities  Please follow-up with your primary care physician regarding your symptoms  Return to the Emergency Department sooner if increased pain, fever, vomiting, difficulty breathing, rash  Albuterol and benzonetate as needed   OTC dextromethorphan

## 2023-04-27 ENCOUNTER — OFFICE VISIT (OUTPATIENT)
Dept: FAMILY MEDICINE CLINIC | Facility: CLINIC | Age: 63
End: 2023-04-27

## 2023-04-27 VITALS
BODY MASS INDEX: 30.73 KG/M2 | SYSTOLIC BLOOD PRESSURE: 146 MMHG | HEIGHT: 64 IN | RESPIRATION RATE: 18 BRPM | TEMPERATURE: 97.8 F | WEIGHT: 180 LBS | DIASTOLIC BLOOD PRESSURE: 92 MMHG | OXYGEN SATURATION: 93 % | HEART RATE: 83 BPM

## 2023-04-27 DIAGNOSIS — I48.0 PAROXYSMAL A-FIB (HCC): ICD-10-CM

## 2023-04-27 DIAGNOSIS — E11.9 TYPE 2 DIABETES MELLITUS WITHOUT COMPLICATION, WITH LONG-TERM CURRENT USE OF INSULIN (HCC): ICD-10-CM

## 2023-04-27 DIAGNOSIS — R05.9 COUGH, UNSPECIFIED TYPE: ICD-10-CM

## 2023-04-27 DIAGNOSIS — Z79.4 TYPE 2 DIABETES MELLITUS WITHOUT COMPLICATION, WITH LONG-TERM CURRENT USE OF INSULIN (HCC): ICD-10-CM

## 2023-04-27 DIAGNOSIS — R05.9 COUGH: ICD-10-CM

## 2023-04-27 DIAGNOSIS — Z65.9 SOCIAL PROBLEM: Primary | ICD-10-CM

## 2023-04-27 DIAGNOSIS — I10 ESSENTIAL HYPERTENSION: ICD-10-CM

## 2023-04-27 LAB — SL AMB POCT HEMOGLOBIN AIC: 9.9 (ref ?–6.5)

## 2023-04-27 RX ORDER — SIMVASTATIN 40 MG
40 TABLET ORAL
Qty: 90 TABLET | Refills: 1 | Status: SHIPPED | OUTPATIENT
Start: 2023-04-27

## 2023-04-27 RX ORDER — BENZONATATE 100 MG/1
100 CAPSULE ORAL 3 TIMES DAILY PRN
Qty: 20 CAPSULE | Refills: 0 | Status: SHIPPED | OUTPATIENT
Start: 2023-04-27

## 2023-04-27 RX ORDER — LOSARTAN POTASSIUM AND HYDROCHLOROTHIAZIDE 25; 100 MG/1; MG/1
1 TABLET ORAL DAILY
Qty: 90 TABLET | Refills: 1 | Status: SHIPPED | OUTPATIENT
Start: 2023-04-27

## 2023-04-27 RX ORDER — ALBUTEROL SULFATE 90 UG/1
2 AEROSOL, METERED RESPIRATORY (INHALATION) EVERY 6 HOURS PRN
Qty: 8 G | Refills: 1 | Status: SHIPPED | OUTPATIENT
Start: 2023-04-27

## 2023-04-27 RX ORDER — INSULIN GLARGINE 100 [IU]/ML
20 INJECTION, SOLUTION SUBCUTANEOUS
Qty: 21 ML | Refills: 1 | Status: SHIPPED | OUTPATIENT
Start: 2023-04-27

## 2023-04-27 RX ORDER — GLIPIZIDE 5 MG/1
5 TABLET ORAL
Qty: 90 TABLET | Refills: 1 | Status: SHIPPED | OUTPATIENT
Start: 2023-04-27

## 2023-04-27 RX ORDER — FLUTICASONE PROPIONATE 44 UG/1
2 AEROSOL, METERED RESPIRATORY (INHALATION) 2 TIMES DAILY
Qty: 10.6 G | Refills: 1 | Status: SHIPPED | OUTPATIENT
Start: 2023-04-27

## 2023-04-27 RX ORDER — AMLODIPINE BESYLATE 10 MG/1
10 TABLET ORAL DAILY
Qty: 90 TABLET | Refills: 1 | Status: SHIPPED | OUTPATIENT
Start: 2023-04-27

## 2023-04-27 NOTE — PROGRESS NOTES
Name: Tanya Garica      : 1960      MRN: [id number]  Encounter Provider: Marina Villalpando MD  Encounter Date: 2023   Encounter department: [address] [place] [address]     1  Social problem  -     Ambulatory Referral to Financial Counseling Program; Future  -     Ambulatory Referral to Social Work Care Management Program; Future    2  Essential hypertension  Assessment & Plan:  Assessment   Medically stable from cardiovascular standpoint with /92 mmHg  Suboptimally controlled  Blood Pressure goal as per JNC-8 less than 140/90 mmHg,   Currently on amlodipine 10 mg qd Hyzaar 100-25 mg qd, poor compliance  EKG from 22: SR with possible premature complexes with aberrant conduction and nonspecific ST changes in the inferior and lateral wall  Plan:   · Continue pt on current treatment, educated on compliance, medication refilled  · BP goals and possible side effects reviewed with patient, recommended to call the office/schedule appointment  if need prior to his next visit if needed   · Blood pressure diary, no need for bp cuff   · Recommended to  bring BP diary in visit  · Will recommend sodium and fat reduction and to increase fruit consumption  · Reassess BP 2 weeks          Orders:  -     amLODIPine (NORVASC) 10 mg tablet; Take 1 tablet (10 mg total) by mouth daily  -     losartan-hydrochlorothiazide (HYZAAR) 100-25 MG per tablet; Take 1 tablet by mouth daily  -     simvastatin (ZOCOR) 40 mg tablet; Take 1 tablet (40 mg total) by mouth daily at bedtime    3   Type 2 diabetes mellitus without complication, with long-term current use of insulin (HCC)  Assessment & Plan:    Lab Results   Component Value Date    HGBA1C 9 9 (A) 2023   Assessment:   · Stable, asymptomatic   · Goal less than 7%, with FBG between 7-130 mg/d and 2 hr postprandial glucose of less than 180 mg/dl, poorly controlled  · Currently on metformin 1000 mg bid, glipizide 5 mg bid  · Blood pressure controlled and lipid; on ACE's inhibitor  Plan:   · Will continue current therapy, medications refilled  · A1C goals reviewed with patient  · The patient was educated on the importance of medication compliance and to monitor blood glucose at home on a daily basis  · Encourage life style changes including diabetes diet and exercise  · Will repeat a1c every  3 months until goal is reached, then q6 months  · Ambulatory referred to diabetes education  · Monitor log blood glucose levels at home on daily basis for further review at next office visit with labs  · Will recommend to schedule visit for iris/DM feet exam       Orders:  -     POCT hemoglobin A1c  -     metFORMIN (GLUCOPHAGE) 1000 MG tablet; Take 1 tablet (1,000 mg total) by mouth 2 (two) times a day with meals  -     Insulin Glargine Solostar (Lantus SoloStar) 100 UNIT/ML SOPN; Inject 0 2 mL (20 Units total) under the skin daily at bedtime  -     glipiZIDE (GLUCOTROL) 5 mg tablet; Take 1 tablet (5 mg total) by mouth 2 (two) times a day before meals  -     simvastatin (ZOCOR) 40 mg tablet; Take 1 tablet (40 mg total) by mouth daily at bedtime    4  Cough, unspecified type    5  Cough  -     benzonatate (TESSALON PERLES) 100 mg capsule; Take 1 capsule (100 mg total) by mouth 3 (three) times a day as needed for cough  -     albuterol (PROVENTIL HFA,VENTOLIN HFA) 90 mcg/act inhaler; Inhale 2 puffs every 6 (six) hours as needed for wheezing or shortness of breath  -     fluticasone (Flovent HFA) 44 mcg/act inhaler; Inhale 2 puffs 2 (two) times a day Rinse mouth after use  -     XR chest pa & lateral; Future; Expected date: 04/27/2023    6  Paroxysmal A-fib [facility])  Assessment & Plan:  Assessment   Stable from cardiorespiratory stand point at today's visit   HR regular through the encounter  Previously on eliquis 5 mg qd, poor compliance due to cost and poor office f/u       Plan   Medication refilled, continue current therapy  Referred to social work for resources, since patient has medicare  Follow up for in two weeks   Orders:  -     apixaban (Eliquis) 5 mg; Take 1 tablet (5 mg total) by mouth 2 (two) times a day         Subjective      Source of information: patient   Information obtained in [address]  First time seen this patient, not well know  It was a pleasure to see Gulshan Vega is a 65 y o   male with multiple chronic conditions who presents today for DM follow up  Patient hasn't been evaluated at our office in more the a year, last seen in 11/2021  Today pt reports that needs refills of his medications, that has been unable to follow up due to the cost of his medical expenses  Last HBA1C @9 5% in 2021  Per EMR patient last seen on 11/2021 at that time patient was on metformin and glipizide, despite reporting compliance at the time, glucose was suboptimally controlled  An attempt to start pt on NPH was made, but patient refused  Please see below for information of DM  Denies unintentional weight loss, fever, chills, fatigue, weakness, headaches, dizziness, rashes, blurred vision, nausea, palpitation, chest pain, SOB, abdominal pain, urinary changes, bowel changes, legs swelling/pain, sleep problems,  sick contacts or recent travel  Patient's medical conditions are stable unless noted otherwise above   Patient has not had any recent hospitalizations, or medical emergencies since last visit  Patient reports no compliance with his medications  Overall patient reports feeling well  Patient has no further complaints other than what is mentioned in the ROS  Review of Systems   Constitutional: Negative for activity change, appetite change, chills and fever  HENT: Negative for congestion, postnasal drip, rhinorrhea and sore throat  Eyes: Negative for visual disturbance  Respiratory: Negative for cough, chest tightness, shortness of breath and wheezing  Cardiovascular: Negative for leg swelling  Gastrointestinal: Negative for abdominal distention, abdominal pain, blood in stool, constipation, diarrhea, nausea and vomiting  Genitourinary: Negative for difficulty urinating, dysuria and hematuria  Musculoskeletal: Negative for arthralgias and myalgias  Skin: Negative for rash  Neurological: Negative for syncope, light-headedness and numbness  Psychiatric/Behavioral: Negative for agitation, behavioral problems, self-injury, sleep disturbance and suicidal ideas  Current Outpatient Medications on File Prior to Visit   Medication Sig    ALPRAZolam (XANAX) 0 25 mg tablet TAKE 1 TABLET BY MOUTH TWICE DAILY AS NEEDED FOR ANXIETY    benzonatate (TESSALON PERLES) 100 mg capsule Take 1 capsule (100 mg total) by mouth every 8 (eight) hours    dextromethorphan-guaiFENesin (ROBITUSSIN DM)  mg/5 mL syrup Take 5 mL by mouth 3 (three) times a day as needed for cough    fluticasone (FLONASE) 50 mcg/act nasal spray 1 spray into each nostril daily    glucose blood test strip 1 each by Other route 2 (two) times a day Use as instructed    Insulin Pen Needle (Pen Needles) 31G X 8 MM MISC Apply topically 4 (four) times a day    Lancets Misc  (Accu-Chek FastClix Lancet) KIT Inject under the skin 2 (two) times a day    liraglutide (VICTOZA) injection Inject 0 1 mL (0 6 mg total) under the skin daily    oxyCODONE (ROXICODONE) 30 MG immediate release tablet     pantoprazole (PROTONIX) 40 mg tablet Take 1 tablet (40 mg total) by mouth daily    PARoxetine (PAXIL) 20 mg tablet Take 1 tablet (20 mg total) by mouth daily    tamsulosin (FLOMAX) 0 4 mg Take 1 capsule (0 4 mg total) by mouth daily with dinner    varenicline (CHANTIX JOSH) 0 5 MG X 11 & 1 MG X 42 tablet Take one 0 5 mg tablet by mouth once daily for 3 days, then one 0 5 mg tablet by mouth twice daily for 4 days, then one 1 mg tablet by mouth twice daily   (Patient not taking: Reported on 8/16/2021) "   varenicline (CHANTIX) 0 5 mg tablet Take 1 tablet (0 5 mg total) by mouth 2 (two) times a day (Patient not taking: Reported on 8/16/2021)    zolpidem (AMBIEN) 10 mg tablet Take 10 mg by mouth daily at bedtime as needed for sleep       Objective     /92 (BP Location: Right arm, Patient Position: Sitting, Cuff Size: Adult)   Pulse 83   Temp 97 8 °F (36 6 °C) (Temporal)   Resp 18   Ht 5' 4\" (1 626 m)   Wt 81 6 kg (180 lb)   SpO2 93%   BMI 30 90 kg/m²     Physical Exam  Vitals and nursing note reviewed  Constitutional:       General: He is not in acute distress  Appearance: He is well-developed  He is obese  He is not ill-appearing, toxic-appearing or diaphoretic  HENT:      Head: Normocephalic and atraumatic  Eyes:      General: No scleral icterus  Extraocular Movements: Extraocular movements intact  Cardiovascular:      Rate and Rhythm: Normal rate and regular rhythm  No extrasystoles are present  Pulses:           Radial pulses are 2+ on the right side and 2+ on the left side  Heart sounds: Normal heart sounds, S1 normal and S2 normal  No murmur heard  No friction rub  No gallop  Pulmonary:      Effort: Pulmonary effort is normal  No respiratory distress  Breath sounds: Normal breath sounds and air entry  Abdominal:      General: Bowel sounds are normal       Palpations: Abdomen is soft  There is no mass  Tenderness: There is no abdominal tenderness  There is no right CVA tenderness, left CVA tenderness, guarding or rebound  Musculoskeletal:         General: No swelling, tenderness, deformity or signs of injury  Normal range of motion  Cervical back: Normal range of motion  Right lower leg: No edema  Left lower leg: No edema  Feet:      Right foot:      Skin integrity: No ulcer, skin breakdown, erythema, warmth, callus or dry skin  Left foot:      Skin integrity: No ulcer, skin breakdown, erythema, warmth, callus or dry skin   " Skin:     General: Skin is warm  Findings: No rash  Neurological:      General: No focal deficit present  Mental Status: He is alert         Gene Palencia MD

## 2023-05-02 NOTE — ASSESSMENT & PLAN NOTE
Assessment   Stable from cardiorespiratory stand point at today's visit   HR regular through the encounter  Previously on eliquis 5 mg qd, poor compliance due to cost and poor office f/u  Plan   Medication refilled, continue current therapy  Referred to social work for resources, since patient has medicare  Follow up for in two weeks

## 2023-05-02 NOTE — ASSESSMENT & PLAN NOTE
Assessment   Medically stable from cardiovascular standpoint with /92 mmHg  Suboptimally controlled  Blood Pressure goal as per JNC-8 less than 140/90 mmHg,   Currently on amlodipine 10 mg qd Hyzaar 100-25 mg qd, poor compliance  EKG from 2/6/22: SR with possible premature complexes with aberrant conduction and nonspecific ST changes in the inferior and lateral wall  Plan:   · Continue pt on current treatment, educated on compliance, medication refilled  · BP goals and possible side effects reviewed with patient, recommended to call the office/schedule appointment  if need prior to his next visit if needed   · Blood pressure diary, no need for bp cuff   · Recommended to  bring BP diary in visit  · Will recommend sodium and fat reduction and to increase fruit consumption      · Reassess BP 2 weeks

## 2023-05-02 NOTE — ASSESSMENT & PLAN NOTE
Assessment  · Negative for Xanthomas or arcus cornealis   · Recently lipid panel:  Cholesterol   Date Value Ref Range Status   09/29/2016 184 50 - 200 mg/dL Final   ·    Triglycerides   Date Value Ref Range Status   09/29/2016 328 (H) <=150 mg/dL Final     Comment:     Specimen collection should occur prior to N-Acetylcysteine or Metamizole administration due to the potential for falsely depressed results  ·    HDL, Direct   Date Value Ref Range Status   09/29/2016 24 (L) 40 - 60 mg/dL Final     Comment:     Specimen collection should occur prior to Metamizole administration due to the potential for falsely depressed results  ·    LDL Calculated   Date Value Ref Range Status   09/29/2016 94 0 - 100 mg/dL Final   ·    · Goal is cholesterol less than 200 mg/dl; LDL less than 100 mg/dl; HDL more than 40 mg/dl and triglycerides less than 159 mg/dl  · At goal    · Currently pt is on zocor 40 mg daily  Plan: Will continue with current treatment

## 2023-05-02 NOTE — ASSESSMENT & PLAN NOTE
Lab Results   Component Value Date    HGBA1C 9 9 (A) 04/27/2023   Assessment:   · Stable, asymptomatic   · Goal less than 7%, with FBG between 7-130 mg/d and 2 hr postprandial glucose of less than 180 mg/dl, poorly controlled  · Currently on metformin 1000 mg bid, glipizide 5 mg bid  · Blood pressure controlled and lipid; on ACE's inhibitor  Plan:   · Will continue current therapy, medications refilled  · A1C goals reviewed with patient  · The patient was educated on the importance of medication compliance and to monitor blood glucose at home on a daily basis  · Encourage life style changes including diabetes diet and exercise  · Will repeat a1c every  3 months until goal is reached, then q6 months  · Ambulatory referred to diabetes education  · Monitor log blood glucose levels at home on daily basis for further review at next office visit with labs     · Will recommend to schedule visit for iris/DM feet exam

## 2023-08-31 ENCOUNTER — PATIENT OUTREACH (OUTPATIENT)
Dept: FAMILY MEDICINE CLINIC | Facility: CLINIC | Age: 63
End: 2023-08-31

## 2023-08-31 NOTE — PROGRESS NOTES
SILVINA HOYOS did receive a new referral from provider stating that although patient has medicare, reports that is unable to pay for his medications and studies. Per chart review this seems there is a FC referral. After chart review SILVINA HOYOS did call Pt and Pt's wife Suha López answered the phone. SILVINA HOYOS introduced herself, her role, assisted  in Danish and explained her the purpose of this call. Suha López seems understanding and did put the telephone in speaker since Pt can listen as well. SILVINA HOYOS explained Pt that provider placed a referral to assist him with medication since his current insurance which is Medicare part A and B does not cover his medications. Pt stated that there is one medication which is the most expensive (Eliquis). SILVINA HOYOS informed Pt that he can applies for MA, if his income does not exceed the amount that he should show as a proof of income. SILVINA HOYOS encouraged Pt to meet up with FC to apply for a secondary insurance (MA). Also, SILVINA HOYOS encouraged Pt to call or come in to the clinic to schedule an appointment with provider. Per chart review this seems Pt does not have upcoming appointments. Pt seems understanding. SILVINA HOYOS asked Pt if is stable financially, with housing, transportation and adequate food as well. Pt expressed that he receives SSI $1,091 and his wife $780.00 monthly, they own house, and Pt drives himself. Pt stated that he attempted to apply for SNAP but due to high income was denied. In addition, SILVINA HOYOS explained Pt that SILVINA HOYOS will contact [address] [place] [address] Patient [address] [place] [address] at ([phone]) to inquire whether or not Pt qualifies for PAP since this company provides the medication above. SILVINA HOYOS explained Pt that she will contact him once SILVINA HOYOS get the information from the company above. SILVINA HOYOS did a research online and got information about the company above and based in the information Pt might be would be eligible for PAP.  SILVINA HOYOS placed a call to   [facility] Patient Assistance [facility] at ([phone]). SILVINA HOYOS spoke with Staff Cy Keller. SILVINA HOYOS was told that Pt would need to attach to the application proof of income, a list of medications from the pharmacy and copy of insurance. The application can be mailed or faxed. Once they receive the application will review to determine whether or not Pt qualifies to receive the (Eliquis) for free about a year. SILVINA HOYOS expressed gratitude to Cy Keller for her assistance and time. Outgoing call. Late entry    SILVINA HOYOS did call Pt to provide the information above. SILVINA HOYOS explained Pt that he should bring the proof of income, list of medication from the pharmacy, copy of insurance as well. SILVINA HOYOS asked Pt which day he is available to come in to the office to bring the documents above and sign the application. Pt stated that he can come 9/6/23. SILVINA HOYOS explained Pt that SILVINA HOYOS will work remote that week, however, SILVINA HOYOS will complete the application and will leave it at her desk so that her co-worker could assists him once he arrives at the clinic. Pt seems understanding. SILVINA HOYOS asked Pt if there is any other social needs that he would like to share in order to assist him as needed. Pt expressed that there is no other social needs at this time. SILVINA HOYOS informed Pt that SILVINA HOYOS will contact him to follow-up, also, Pt can reach the SILVINA  for further support. Pt seems understanding and thankful for the SILVINA HOYOS assistance and time. SILVINA HOYOS is remain available for further assistance as needed. SILVINA HOYOS will continue to follow-up.

## 2023-09-01 ENCOUNTER — PATIENT OUTREACH (OUTPATIENT)
Dept: FAMILY MEDICINE CLINIC | Facility: CLINIC | Age: 63
End: 2023-09-01

## 2023-09-01 NOTE — PROGRESS NOTES
SILVINA HOYOS met with provider Brittany. Provider signed the form. SILVINA HOYOS called Pt, explained him that the form has been completed, and Pt should bring the documents as requested. Pt seems understanding. SILVINA HOYOS will leave the application at the office for her co-worker provides it to Pt. Once Pt sign the form can be faxed to [facility]. SILVINA HOYOS is remain available for further assistance as needed. SILVINA HOYOS will continue to follow-up.

## 2023-09-06 ENCOUNTER — PATIENT OUTREACH (OUTPATIENT)
Dept: FAMILY MEDICINE CLINIC | Facility: CLINIC | Age: 63
End: 2023-09-06

## 2023-09-06 NOTE — PROGRESS NOTES
SILVINA HOYOS received prior notification from [address] that pt will be coming in on 9/6/2023 at 7525 to sign PAP application. Pt did not come in and SILVINA HOYOS called pt via  [phone], however, the call went to voicemail. SILVINA HOYOS left message requesting a return call. SILVINA HOYOS later received return call and called pt via  [phone]. SILVINA HOYOS spoke to pt's spouse and she stated that pt's sister passed away in [place] and he will be going down there and unable to come in. SILVINA HOYOS made aware that they should be able to come in some time next week. SILVINA HOYOS provided the contact number for [facility]IL[facility] for them to call when they come back and will come in. Note routed to [address] SILVINA HOYOS will continue to be available for any additional needs as requested.

## 2023-09-21 ENCOUNTER — PATIENT OUTREACH (OUTPATIENT)
Dept: FAMILY MEDICINE CLINIC | Facility: CLINIC | Age: 63
End: 2023-09-21

## 2023-09-21 NOTE — PROGRESS NOTES
SILVINA HOYOS did place a call to Pt to inform that [facility] will follow-up his application for medication assistance program. [employer] and SILVINA HOYOS discussed about Pt's medication. Pt did not  the phone. SILVINA HOYOS was unable to leave  and will attempt to reach Pt a later time. SILVINA HOYOS is remain available for further assistance as needed. SILVINA HOYOS will continue to follow-up.

## 2023-09-25 ENCOUNTER — PATIENT OUTREACH (OUTPATIENT)
Dept: FAMILY MEDICINE CLINIC | Facility: CLINIC | Age: 63
End: 2023-09-25

## 2023-09-25 NOTE — PROGRESS NOTES
Medication patient assistance program (MPAP) specialist called patient at [phone] via  Moe Gunter ID # [id number] regarding application for Eliquis patient assistance program from [facility]. MPAP specialist called to ask for income verification and signatures from patient to continue application. MPAP specialist tried to leave voice message, but phone just continued ringing with no answer.  tried to call twice with same result. Phone rang with no answer. MPAP specialist will attempt another time to reach patient regarding application.

## 2023-10-06 ENCOUNTER — PATIENT OUTREACH (OUTPATIENT)
Dept: FAMILY MEDICINE CLINIC | Facility: CLINIC | Age: 63
End: 2023-10-06

## 2023-10-06 NOTE — PROGRESS NOTES
Chart review. Per chart review this indicates that [employer] (MPAP) attempted to reach Pt but he did not  the phone. Since Eyal Pabon will follow-up Pt SILVINA HOYOS will close this case today. SILVINA HOYOS is closing case today due to [facility] Util[employer] will assist the Pt. SILVINA HOYOS is remain available for further assistance as needed.

## 2024-01-03 NOTE — ASSESSMENT & PLAN NOTE
Stable from cardiorespiratory stand point at today's visit   HR regular on physical   ECHO from 9/2016: EF of 55-60%  with LV mild concentric hypertrophy.  Previously on eliquis 5 mg qd, poor compliance due to cost and poor office f/u.   Last seen by Cardiology on 2018  Social work on board      Plan   Referral to Cardiology to reestablish care and evaluate the need for anticoagulation

## 2024-01-03 NOTE — ASSESSMENT & PLAN NOTE
A1c from 4/27/23 at 9.9; Today's A1c: 9.5   Currently on metformin 1000 mg bid, glipizide 5 mg bid, Lantus 28 U HS   Denies hypoglycemic symptoms   Last eye exam: patient can't recall   Last foot exam: Reports last visit   Microalbuminuria /Cr abnormal at 113 in 2019  Diet: reports well balanced diet   Exercise: denies formal exercise   Denies polyuria, polydipsia or weight loss   Patient is a smoker (1 pack per week currently, used to smoke one pack a day)      Plan:   Stop glipizide  Start Trulicity q weekly and continue metformin and Lantus   A1C goals reviewed with patient.    The patient was educated on the importance of medication compliance and to monitor blood glucose at home on a daily basis  Encourage life style changes including diabetes diet and exercise.   IRIS exam ordered   Microalbumin/cr ratio  CMP, lipid panel

## 2024-01-03 NOTE — ASSESSMENT & PLAN NOTE
BP today 126/74 mmHg.   Well controlled, at goal as per JNC 8  Currently on amlodipine 10 mg qd Hyzaar 100-25 mg qd  EKG from 2/6/22: SR with possible premature complexes with aberrant conduction and nonspecific ST changes in the inferior and lateral wall.     Plan:   Continue current treatment,   BP goals reviewed with patient  DASH diet recommended   Recommended to  bring BP diary in visit.

## 2024-01-03 NOTE — PROGRESS NOTES
Name: Gulshan Arias      : 1960      MRN: [id number]  Encounter Provider: Yahaira Hussein MD  Encounter Date: 2024   Encounter department: [facility]    Assessment & Plan     1. Type 2 diabetes mellitus without complication, with long-term current use of insulin ([facility])  Assessment & Plan:  A1c from 23 at 9.9; Today's A1c: 9.5   Currently on metformin 1000 mg bid, glipizide 5 mg bid, Lantus 28 U HS   Denies hypoglycemic symptoms   Last eye exam: patient can't recall   Last foot exam: Reports last visit   Microalbuminuria /Cr abnormal at 113 in 2019  Diet: reports well balanced diet   Exercise: denies formal exercise   Denies polyuria, polydipsia or weight loss   Patient is a smoker (1 pack per week currently, used to smoke one pack a day)      Plan:   Stop glipizide  Start Trulicity q weekly and continue metformin and Lantus   A1C goals reviewed with patient.    The patient was educated on the importance of medication compliance and to monitor blood glucose at home on a daily basis  Encourage life style changes including diabetes diet and exercise.   IRIS exam ordered   Microalbumin/cr ratio  CMP, lipid panel          Orders:  -     IRIS Diabetic eye exam  -     Comprehensive metabolic panel; Future  -     POCT hemoglobin A1c  -     Insulin Glargine Solostar (Lantus SoloStar) 100 UNIT/ML SOPN; Inject 0.28 mL (28 Units total) under the skin daily at bedtime  -     metFORMIN (GLUCOPHAGE) 1000 MG tablet; Take 1 tablet (1,000 mg total) by mouth 2 (two) times a day with meals  -     Insulin Pen Needle (Pen Needles) 31G X 8 MM MISC; Apply topically 4 (four) times a day  -     Lancets Misc. (Accu-Chek FastClix Lancet) KIT; Inject under the skin 2 (two) times a day  -     Ambulatory Referral to Diabetic Education; Future  -     Lipid panel; Future  -     dulaglutide (Trulicity) 0.75 MG/0.5ML injection; Inject 0.5 mL (0.75 mg total) under the skin every 7  days    2. Essential hypertension  Assessment & Plan:  BP today 126/74 mmHg.   Well controlled, at goal as per JNC 8  Currently on amlodipine 10 mg qd Hyzaar 100-25 mg qd  EKG from 2/6/22: SR with possible premature complexes with aberrant conduction and nonspecific ST changes in the inferior and lateral wall.     Plan:   Continue current treatment,   BP goals reviewed with patient  DASH diet recommended   Recommended to  bring BP diary in visit.       Orders:  -     Ambulatory Referral to Cardiology; Future  -     losartan-hydrochlorothiazide (HYZAAR) 100-25 MG per tablet; Take 1 tablet by mouth daily  -     amLODIPine (NORVASC) 10 mg tablet; Take 1 tablet (10 mg total) by mouth daily  -     Lipid panel; Future    3. Paroxysmal A-fib (HCC)  Assessment & Plan:  Stable from cardiorespiratory stand point at today's visit   HR regular on physical   ECHO from 9/2016: EF of 55-60%  with LV mild concentric hypertrophy.  Previously on eliquis 5 mg qd, poor compliance due to cost and poor office f/u.   Last seen by Cardiology on 2018  Social work on board      Plan   Referral to Cardiology to reestablish care and evaluate the need for anticoagulation     Orders:  -     Ambulatory Referral to Cardiology; Future    4. Generalized anxiety disorder  -     PARoxetine (PAXIL) 20 mg tablet; Take 1 tablet (20 mg total) by mouth daily           Anusha Gaffney  is a 62 y.o.  male with multiple chronic conditions including T2DM, HTN, GERD and Afib who presents today for DM follow up.    He denies unintentional weight loss, fever, chills, fatigue, weakness, headaches, dizziness, rashes, blurred vision, nausea, palpitation, chest pain, SOB, abdominal pain, urinary changes, bowel changes, legs swelling/pain, sleep problems,  sick contacts or recent travel.            Review of Systems   Constitutional:  Negative for chills and fever.   Eyes:  Negative for pain and visual disturbance.   Respiratory:  Negative for shortness of  breath.    Cardiovascular:  Negative for chest pain, palpitations and leg swelling.   Gastrointestinal:  Negative for abdominal pain, constipation, diarrhea and vomiting.   Genitourinary:  Negative for dysuria and hematuria.   Musculoskeletal:  Negative for arthralgias and back pain.   Skin:  Negative for color change and rash.   Neurological:  Negative for seizures and syncope.   All other systems reviewed and are negative.      Current Outpatient Medications on File Prior to Visit   Medication Sig    albuterol (PROVENTIL HFA,VENTOLIN HFA) 90 mcg/act inhaler Inhale 2 puffs every 6 (six) hours as needed for wheezing or shortness of breath    ALPRAZolam (XANAX) 0.25 mg tablet TAKE 1 TABLET BY MOUTH TWICE DAILY AS NEEDED FOR ANXIETY    apixaban (Eliquis) 5 mg Take 1 tablet (5 mg total) by mouth 2 (two) times a day    benzonatate (TESSALON PERLES) 100 mg capsule Take 1 capsule (100 mg total) by mouth every 8 (eight) hours    benzonatate (TESSALON PERLES) 100 mg capsule Take 1 capsule (100 mg total) by mouth 3 (three) times a day as needed for cough    dextromethorphan-guaiFENesin (ROBITUSSIN DM)  mg/5 mL syrup Take 5 mL by mouth 3 (three) times a day as needed for cough    fluticasone (FLONASE) 50 mcg/act nasal spray 1 spray into each nostril daily    fluticasone (Flovent HFA) 44 mcg/act inhaler Inhale 2 puffs 2 (two) times a day Rinse mouth after use.    glucose blood test strip 1 each by Other route 2 (two) times a day Use as instructed    oxyCODONE (ROXICODONE) 30 MG immediate release tablet     pantoprazole (PROTONIX) 40 mg tablet Take 1 tablet (40 mg total) by mouth daily    simvastatin (ZOCOR) 40 mg tablet Take 1 tablet (40 mg total) by mouth daily at bedtime    tamsulosin (FLOMAX) 0.4 mg Take 1 capsule (0.4 mg total) by mouth daily with dinner    varenicline (CHANTIX JOSH) 0.5 MG X 11 & 1 MG X 42 tablet Take one 0.5 mg tablet by mouth once daily for 3 days, then one 0.5 mg tablet by mouth twice daily for 4  "days, then one 1 mg tablet by mouth twice daily. (Patient not taking: Reported on 8/16/2021)    varenicline (CHANTIX) 0.5 mg tablet Take 1 tablet (0.5 mg total) by mouth 2 (two) times a day (Patient not taking: Reported on 8/16/2021)    zolpidem (AMBIEN) 10 mg tablet Take 10 mg by mouth daily at bedtime as needed for sleep    [DISCONTINUED] amLODIPine (NORVASC) 10 mg tablet Take 1 tablet (10 mg total) by mouth daily    [DISCONTINUED] glipiZIDE (GLUCOTROL) 5 mg tablet Take 1 tablet (5 mg total) by mouth 2 (two) times a day before meals    [DISCONTINUED] Insulin Glargine Solostar (Lantus SoloStar) 100 UNIT/ML SOPN Inject 0.2 mL (20 Units total) under the skin daily at bedtime    [DISCONTINUED] Insulin Pen Needle (Pen Needles) 31G X 8 MM MISC Apply topically 4 (four) times a day    [DISCONTINUED] Lancets Misc. (Accu-Chek FastClix Lancet) KIT Inject under the skin 2 (two) times a day    [DISCONTINUED] liraglutide (VICTOZA) injection Inject 0.1 mL (0.6 mg total) under the skin daily    [DISCONTINUED] losartan-hydrochlorothiazide (HYZAAR) 100-25 MG per tablet Take 1 tablet by mouth daily    [DISCONTINUED] metFORMIN (GLUCOPHAGE) 1000 MG tablet Take 1 tablet (1,000 mg total) by mouth 2 (two) times a day with meals    [DISCONTINUED] PARoxetine (PAXIL) 20 mg tablet Take 1 tablet (20 mg total) by mouth daily       Objective     /74 (BP Location: Right arm, Patient Position: Sitting, Cuff Size: Large)   Pulse 86   Temp 98.6 °F (37 °C) (Temporal)   Resp 18   Ht 5' 4\" (1.626 m)   Wt 81.6 kg (180 lb)   SpO2 97%   BMI 30.90 kg/m²     Physical Exam  Constitutional:       General: He is not in acute distress.     Appearance: Normal appearance. He is not toxic-appearing.   HENT:      Head: Normocephalic.      Right Ear: Tympanic membrane and external ear normal.      Left Ear: Tympanic membrane and external ear normal.      Nose: Nose normal. No congestion or rhinorrhea.      Mouth/Throat:      Mouth: Mucous membranes are " moist.      Pharynx: Oropharynx is clear. No oropharyngeal exudate or posterior oropharyngeal erythema.   Eyes:      General: No scleral icterus.     Conjunctiva/sclera: Conjunctivae normal.   Cardiovascular:      Rate and Rhythm: Normal rate and regular rhythm.      Pulses: Normal pulses.      Heart sounds: No murmur heard.     No friction rub. No gallop.   Pulmonary:      Effort: Pulmonary effort is normal. No respiratory distress.      Breath sounds: Normal breath sounds. No wheezing, rhonchi or rales.   Abdominal:      General: There is no distension.      Palpations: Abdomen is soft.      Tenderness: There is no abdominal tenderness. There is no guarding.   Musculoskeletal:      Cervical back: Normal range of motion.      Right lower leg: No edema.      Left lower leg: No edema.   Skin:     General: Skin is warm and dry.      Capillary Refill: Capillary refill takes less than 2 seconds.   Neurological:      General: No focal deficit present.      Mental Status: He is alert and oriented to person, place, and time.   Psychiatric:         Mood and Affect: Mood normal.         Behavior: Behavior normal.       Yahaira Hussein MD

## 2024-01-04 ENCOUNTER — OFFICE VISIT (OUTPATIENT)
Dept: FAMILY MEDICINE CLINIC | Facility: CLINIC | Age: 64
End: 2024-01-04

## 2024-01-04 VITALS
BODY MASS INDEX: 30.73 KG/M2 | HEIGHT: 64 IN | HEART RATE: 86 BPM | DIASTOLIC BLOOD PRESSURE: 74 MMHG | SYSTOLIC BLOOD PRESSURE: 126 MMHG | OXYGEN SATURATION: 97 % | RESPIRATION RATE: 18 BRPM | TEMPERATURE: 98.6 F | WEIGHT: 180 LBS

## 2024-01-04 DIAGNOSIS — Z79.4 TYPE 2 DIABETES MELLITUS WITHOUT COMPLICATION, WITH LONG-TERM CURRENT USE OF INSULIN (HCC): Primary | ICD-10-CM

## 2024-01-04 DIAGNOSIS — I10 ESSENTIAL HYPERTENSION: ICD-10-CM

## 2024-01-04 DIAGNOSIS — F41.1 GENERALIZED ANXIETY DISORDER: ICD-10-CM

## 2024-01-04 DIAGNOSIS — I48.0 PAROXYSMAL A-FIB (HCC): ICD-10-CM

## 2024-01-04 DIAGNOSIS — E11.9 TYPE 2 DIABETES MELLITUS WITHOUT COMPLICATION, WITH LONG-TERM CURRENT USE OF INSULIN (HCC): Primary | ICD-10-CM

## 2024-01-04 LAB — SL AMB POCT HEMOGLOBIN AIC: 9.5 (ref ?–6.5)

## 2024-01-04 PROCEDURE — 99213 OFFICE O/P EST LOW 20 MIN: CPT | Performed by: FAMILY MEDICINE

## 2024-01-04 PROCEDURE — 83036 HEMOGLOBIN GLYCOSYLATED A1C: CPT | Performed by: FAMILY MEDICINE

## 2024-01-04 RX ORDER — PEN NEEDLE, DIABETIC 30 GX3/16"
NEEDLE, DISPOSABLE MISCELLANEOUS 4 TIMES DAILY
Qty: 100 EACH | Refills: 11 | Status: SHIPPED | OUTPATIENT
Start: 2024-01-04

## 2024-01-04 RX ORDER — AMLODIPINE BESYLATE 10 MG/1
10 TABLET ORAL DAILY
Qty: 90 TABLET | Refills: 1 | Status: SHIPPED | OUTPATIENT
Start: 2024-01-04

## 2024-01-04 RX ORDER — LANCING DEVICE/LANCETS
KIT MISCELLANEOUS 2 TIMES DAILY
Qty: 100 KIT | Refills: 2 | Status: SHIPPED | OUTPATIENT
Start: 2024-01-04

## 2024-01-04 RX ORDER — LOSARTAN POTASSIUM AND HYDROCHLOROTHIAZIDE 25; 100 MG/1; MG/1
1 TABLET ORAL DAILY
Qty: 90 TABLET | Refills: 1 | Status: SHIPPED | OUTPATIENT
Start: 2024-01-04

## 2024-01-04 RX ORDER — PAROXETINE HYDROCHLORIDE 20 MG/1
20 TABLET, FILM COATED ORAL DAILY
Qty: 30 TABLET | Refills: 2 | Status: SHIPPED | OUTPATIENT
Start: 2024-01-04

## 2024-01-04 RX ORDER — GLIPIZIDE 5 MG/1
5 TABLET ORAL
Qty: 90 TABLET | Refills: 1 | Status: SHIPPED | OUTPATIENT
Start: 2024-01-04 | End: 2024-01-04

## 2024-01-04 RX ORDER — INSULIN GLARGINE 100 [IU]/ML
28 INJECTION, SOLUTION SUBCUTANEOUS
Qty: 21 ML | Refills: 1 | Status: SHIPPED | OUTPATIENT
Start: 2024-01-04

## 2024-01-04 RX ORDER — DULAGLUTIDE 0.75 MG/.5ML
0.75 INJECTION, SOLUTION SUBCUTANEOUS
Qty: 6 ML | Refills: 1 | Status: SHIPPED | OUTPATIENT
Start: 2024-01-04

## 2024-01-30 ENCOUNTER — APPOINTMENT (OUTPATIENT)
Dept: LAB | Facility: HOSPITAL | Age: 64
End: 2024-01-30
Payer: MEDICARE

## 2024-01-30 DIAGNOSIS — E11.9 TYPE 2 DIABETES MELLITUS WITHOUT COMPLICATION, WITH LONG-TERM CURRENT USE OF INSULIN (HCC): ICD-10-CM

## 2024-01-30 DIAGNOSIS — I10 ESSENTIAL HYPERTENSION: ICD-10-CM

## 2024-01-30 DIAGNOSIS — Z79.4 TYPE 2 DIABETES MELLITUS WITHOUT COMPLICATION, WITH LONG-TERM CURRENT USE OF INSULIN (HCC): ICD-10-CM

## 2024-01-30 LAB
ALBUMIN SERPL BCP-MCNC: 4.4 G/DL (ref 3.5–5)
ALP SERPL-CCNC: 67 U/L (ref 34–104)
ALT SERPL W P-5'-P-CCNC: 15 U/L (ref 7–52)
ANION GAP SERPL CALCULATED.3IONS-SCNC: 10 MMOL/L
AST SERPL W P-5'-P-CCNC: 13 U/L (ref 13–39)
BILIRUB SERPL-MCNC: 0.39 MG/DL (ref 0.2–1)
BUN SERPL-MCNC: 24 MG/DL (ref 5–25)
CALCIUM SERPL-MCNC: 9.2 MG/DL (ref 8.4–10.2)
CHLORIDE SERPL-SCNC: 95 MMOL/L (ref 96–108)
CHOLEST SERPL-MCNC: 184 MG/DL
CO2 SERPL-SCNC: 33 MMOL/L (ref 21–32)
CREAT SERPL-MCNC: 1.51 MG/DL (ref 0.6–1.3)
GFR SERPL CREATININE-BSD FRML MDRD: 48 ML/MIN/1.73SQ M
GLUCOSE P FAST SERPL-MCNC: 260 MG/DL (ref 65–99)
HDLC SERPL-MCNC: 30 MG/DL
NONHDLC SERPL-MCNC: 154 MG/DL
POTASSIUM SERPL-SCNC: 3.7 MMOL/L (ref 3.5–5.3)
PROT SERPL-MCNC: 7.7 G/DL (ref 6.4–8.4)
SODIUM SERPL-SCNC: 138 MMOL/L (ref 135–147)
TRIGL SERPL-MCNC: 417 MG/DL

## 2024-01-30 PROCEDURE — 80061 LIPID PANEL: CPT

## 2024-01-30 PROCEDURE — 80053 COMPREHEN METABOLIC PANEL: CPT

## 2024-01-30 PROCEDURE — 36415 COLL VENOUS BLD VENIPUNCTURE: CPT

## 2024-01-31 PROBLEM — Z71.6 TOBACCO ABUSE COUNSELING: Status: RESOLVED | Noted: 2019-09-16 | Resolved: 2024-01-31

## 2024-01-31 NOTE — PROGRESS NOTES
Name: Gulshan Arias      : 1960      MRN: [id number]  Encounter Provider: Yahaira Hussein MD  Encounter Date: 2024   Encounter department: [facility]    Assessment & Plan     1. Financial difficulties  Assessment & Plan:  Unable to afford his prescriptions for Afib and T2DM control   Old application has    Referral to reconnect patient with office resources  PACE program information provided      Orders:  -     Ambulatory Referral to Financial Counseling Program; Future    2. Paroxysmal A-fib (HCC)  Assessment & Plan:  Stable from cardiorespiratory stand point at today's visit   Previously on eliquis 5 mg qd, poor compliance due to cost and poor office f/u.   Last seen by Cardiology on      Plan   Cardiology appointment scheduled to 1/15/24   Resumed Eliquis   ECHO  Information for PACE program was given      Orders:  -     Echo complete w/ contrast if indicated; Future; Expected date: 2024    3. Cough  -     albuterol (PROVENTIL HFA,VENTOLIN HFA) 90 mcg/act inhaler; Inhale 2 puffs every 6 (six) hours as needed for wheezing or shortness of breath  -     benzonatate (TESSALON PERLES) 100 mg capsule; Take 1 capsule (100 mg total) by mouth 3 (three) times a day as needed for cough  -     fluticasone (Flovent HFA) 44 mcg/act inhaler; Inhale 2 puffs 2 (two) times a day Rinse mouth after use.    4. Type 2 diabetes mellitus without complication, with long-term current use of insulin ([facility])  -     Insulin Glargine Solostar (Lantus SoloStar) 100 UNIT/ML SOPN; Inject 0.28 mL (28 Units total) under the skin daily at bedtime  -     Insulin Pen Needle (TechLite Pen Needles) 31G X 8 MM MISC; Apply topically 4 (four) times a day  -     Lancets Misc. (Accu-Chek FastClix Lancet) KIT; Inject under the skin 2 (two) times a day  -     Glucometer  -     simvastatin (ZOCOR) 40 mg tablet; Take 1 tablet (40 mg total) by mouth daily at bedtime  -     dulaglutide  (Trulicity) 0.75 MG/0.5ML injection; Inject 0.5 mL (0.75 mg total) under the skin every 7 days    5. Gastroesophageal reflux disease without esophagitis  -     pantoprazole (PROTONIX) 40 mg tablet; Take 1 tablet (40 mg total) by mouth daily    6. Essential hypertension  -     simvastatin (ZOCOR) 40 mg tablet; Take 1 tablet (40 mg total) by mouth daily at bedtime           Anusha Gaffney  is a 62 y.o.  male with multiple chronic conditions including T2DM, HTN, GERD and Afib who presents today for lab review, medication reconciliation and to request refills.   During Med rec patient reported that he is not taking Eliquis neither Trulicity because they were too expensive. Has been off Eliquis for the past year and never started Trulicity.   He also has not been taking atorvastatin for a couple months which is probably the reason why his lipid levels are elevated.   Regarding his Afib:  denies CP, palpitation or SOB. HR regular on physical   ECHO from 9/2016: EF of 55-60%  with LV mild concentric hypertrophy      Review of Systems   Respiratory:  Negative for shortness of breath.    Cardiovascular:  Negative for chest pain and palpitations.   All other systems reviewed and are negative.      Current Outpatient Medications on File Prior to Visit   Medication Sig    ALPRAZolam (XANAX) 0.25 mg tablet TAKE 1 TABLET BY MOUTH TWICE DAILY AS NEEDED FOR ANXIETY    amLODIPine (NORVASC) 10 mg tablet Take 1 tablet (10 mg total) by mouth daily    apixaban (Eliquis) 5 mg Take 1 tablet (5 mg total) by mouth 2 (two) times a day    fluticasone (FLONASE) 50 mcg/act nasal spray 1 spray into each nostril daily    glucose blood test strip 1 each by Other route 2 (two) times a day Use as instructed    losartan-hydrochlorothiazide (HYZAAR) 100-25 MG per tablet Take 1 tablet by mouth daily    metFORMIN (GLUCOPHAGE) 1000 MG tablet Take 1 tablet (1,000 mg total) by mouth 2 (two) times a day with meals    oxyCODONE (ROXICODONE) 30 MG  immediate release tablet     PARoxetine (PAXIL) 20 mg tablet Take 1 tablet (20 mg total) by mouth daily    zolpidem (AMBIEN) 10 mg tablet Take 10 mg by mouth daily at bedtime as needed for sleep    [DISCONTINUED] albuterol (PROVENTIL HFA,VENTOLIN HFA) 90 mcg/act inhaler Inhale 2 puffs every 6 (six) hours as needed for wheezing or shortness of breath    [DISCONTINUED] benzonatate (TESSALON PERLES) 100 mg capsule Take 1 capsule (100 mg total) by mouth every 8 (eight) hours    [DISCONTINUED] benzonatate (TESSALON PERLES) 100 mg capsule Take 1 capsule (100 mg total) by mouth 3 (three) times a day as needed for cough    [DISCONTINUED] dextromethorphan-guaiFENesin (ROBITUSSIN DM)  mg/5 mL syrup Take 5 mL by mouth 3 (three) times a day as needed for cough    [DISCONTINUED] dulaglutide (Trulicity) 0.75 MG/0.5ML injection Inject 0.5 mL (0.75 mg total) under the skin every 7 days    [DISCONTINUED] fluticasone (Flovent HFA) 44 mcg/act inhaler Inhale 2 puffs 2 (two) times a day Rinse mouth after use.    [DISCONTINUED] Insulin Glargine Solostar (Lantus SoloStar) 100 UNIT/ML SOPN Inject 0.28 mL (28 Units total) under the skin daily at bedtime    [DISCONTINUED] Insulin Pen Needle (Pen Needles) 31G X 8 MM MISC Apply topically 4 (four) times a day    [DISCONTINUED] Lancets Misc. (Accu-Chek FastClix Lancet) KIT Inject under the skin 2 (two) times a day    [DISCONTINUED] pantoprazole (PROTONIX) 40 mg tablet Take 1 tablet (40 mg total) by mouth daily    [DISCONTINUED] simvastatin (ZOCOR) 40 mg tablet Take 1 tablet (40 mg total) by mouth daily at bedtime    [DISCONTINUED] tamsulosin (FLOMAX) 0.4 mg Take 1 capsule (0.4 mg total) by mouth daily with dinner    [DISCONTINUED] varenicline (CHANTIX JOSH) 0.5 MG X 11 & 1 MG X 42 tablet Take one 0.5 mg tablet by mouth once daily for 3 days, then one 0.5 mg tablet by mouth twice daily for 4 days, then one 1 mg tablet by mouth twice daily. (Patient not taking: Reported on 8/16/2021)     "[DISCONTINUED] varenicline (CHANTIX) 0.5 mg tablet Take 1 tablet (0.5 mg total) by mouth 2 (two) times a day (Patient not taking: Reported on 8/16/2021)       Objective     /70 (BP Location: Right arm, Patient Position: Sitting, Cuff Size: Standard)   Pulse 96   Temp 98.7 °F (37.1 °C) (Temporal)   Resp 16   Ht 5' 4\" (1.626 m)   Wt 83 kg (183 lb)   SpO2 97%   BMI 31.41 kg/m²     Physical Exam  Constitutional:       General: He is not in acute distress.     Appearance: Normal appearance. He is not toxic-appearing.   HENT:      Head: Normocephalic.      Right Ear: External ear normal.      Left Ear: External ear normal.      Nose: Nose normal. No congestion or rhinorrhea.   Eyes:      General: No scleral icterus.     Conjunctiva/sclera: Conjunctivae normal.   Cardiovascular:      Rate and Rhythm: Normal rate and regular rhythm.      Pulses: Normal pulses.      Heart sounds: No murmur heard.  Pulmonary:      Effort: Pulmonary effort is normal. No respiratory distress.      Breath sounds: Normal breath sounds. No wheezing, rhonchi or rales.   Musculoskeletal:      Cervical back: Normal range of motion.      Right lower leg: No edema.      Left lower leg: No edema.   Skin:     General: Skin is warm and dry.      Capillary Refill: Capillary refill takes less than 2 seconds.   Neurological:      General: No focal deficit present.      Mental Status: He is alert and oriented to person, place, and time.   Psychiatric:         Mood and Affect: Mood normal.         Behavior: Behavior normal.       Yahaira Hussein MD    "

## 2024-02-01 ENCOUNTER — TELEPHONE (OUTPATIENT)
Dept: FAMILY MEDICINE CLINIC | Facility: CLINIC | Age: 64
End: 2024-02-01

## 2024-02-01 ENCOUNTER — OFFICE VISIT (OUTPATIENT)
Dept: FAMILY MEDICINE CLINIC | Facility: CLINIC | Age: 64
End: 2024-02-01

## 2024-02-01 VITALS
HEIGHT: 64 IN | OXYGEN SATURATION: 97 % | BODY MASS INDEX: 31.24 KG/M2 | WEIGHT: 183 LBS | TEMPERATURE: 98.7 F | DIASTOLIC BLOOD PRESSURE: 70 MMHG | HEART RATE: 96 BPM | RESPIRATION RATE: 16 BRPM | SYSTOLIC BLOOD PRESSURE: 130 MMHG

## 2024-02-01 DIAGNOSIS — I48.0 PAROXYSMAL A-FIB (HCC): ICD-10-CM

## 2024-02-01 DIAGNOSIS — Z79.4 TYPE 2 DIABETES MELLITUS WITHOUT COMPLICATION, WITH LONG-TERM CURRENT USE OF INSULIN (HCC): ICD-10-CM

## 2024-02-01 DIAGNOSIS — E11.9 TYPE 2 DIABETES MELLITUS WITHOUT COMPLICATION, WITH LONG-TERM CURRENT USE OF INSULIN (HCC): ICD-10-CM

## 2024-02-01 DIAGNOSIS — Z59.9 FINANCIAL DIFFICULTIES: Primary | ICD-10-CM

## 2024-02-01 DIAGNOSIS — I10 ESSENTIAL HYPERTENSION: ICD-10-CM

## 2024-02-01 DIAGNOSIS — K21.9 GASTROESOPHAGEAL REFLUX DISEASE WITHOUT ESOPHAGITIS: ICD-10-CM

## 2024-02-01 DIAGNOSIS — R05.9 COUGH: ICD-10-CM

## 2024-02-01 PROCEDURE — 99213 OFFICE O/P EST LOW 20 MIN: CPT | Performed by: FAMILY MEDICINE

## 2024-02-01 RX ORDER — ALBUTEROL SULFATE 90 UG/1
2 AEROSOL, METERED RESPIRATORY (INHALATION) EVERY 6 HOURS PRN
Qty: 8 G | Refills: 1 | Status: SHIPPED | OUTPATIENT
Start: 2024-02-01

## 2024-02-01 RX ORDER — INSULIN GLARGINE 100 [IU]/ML
28 INJECTION, SOLUTION SUBCUTANEOUS
Qty: 21 ML | Refills: 1 | Status: SHIPPED | OUTPATIENT
Start: 2024-02-01

## 2024-02-01 RX ORDER — BENZONATATE 100 MG/1
100 CAPSULE ORAL 3 TIMES DAILY PRN
Qty: 20 CAPSULE | Refills: 0 | Status: SHIPPED | OUTPATIENT
Start: 2024-02-01

## 2024-02-01 RX ORDER — DULAGLUTIDE 0.75 MG/.5ML
0.75 INJECTION, SOLUTION SUBCUTANEOUS
Qty: 6 ML | Refills: 1 | Status: SHIPPED | OUTPATIENT
Start: 2024-02-01

## 2024-02-01 RX ORDER — FLUTICASONE PROPIONATE 44 UG/1
2 AEROSOL, METERED RESPIRATORY (INHALATION) 2 TIMES DAILY
Qty: 10.6 G | Refills: 1 | Status: SHIPPED | OUTPATIENT
Start: 2024-02-01

## 2024-02-01 RX ORDER — SIMVASTATIN 40 MG
40 TABLET ORAL
Qty: 90 TABLET | Refills: 1 | Status: SHIPPED | OUTPATIENT
Start: 2024-02-01

## 2024-02-01 RX ORDER — PANTOPRAZOLE SODIUM 40 MG/1
40 TABLET, DELAYED RELEASE ORAL DAILY
Qty: 30 TABLET | Refills: 1 | Status: SHIPPED | OUTPATIENT
Start: 2024-02-01

## 2024-02-01 RX ORDER — PEN NEEDLE, DIABETIC 31 GX5/16"
NEEDLE, DISPOSABLE MISCELLANEOUS 4 TIMES DAILY
Qty: 360 EACH | Refills: 0 | Status: SHIPPED | OUTPATIENT
Start: 2024-02-01 | End: 2024-05-01

## 2024-02-01 RX ORDER — LANCING DEVICE/LANCETS
KIT MISCELLANEOUS 2 TIMES DAILY
Qty: 100 KIT | Refills: 2 | Status: SHIPPED | OUTPATIENT
Start: 2024-02-01

## 2024-02-01 SDOH — ECONOMIC STABILITY - INCOME SECURITY: PROBLEM RELATED TO HOUSING AND ECONOMIC CIRCUMSTANCES, UNSPECIFIED: Z59.9

## 2024-02-01 NOTE — ASSESSMENT & PLAN NOTE
Stable from cardiorespiratory stand point at today's visit   Previously on eliquis 5 mg qd, poor compliance due to cost and poor office f/u.   Last seen by Cardiology on 2018     Plan   Cardiology appointment scheduled to 1/15/24   Resumed Eliquis   ECHO  Information for PACE program was given

## 2024-02-01 NOTE — TELEPHONE ENCOUNTER
Patient used to be on eliquis but now is having cost issues.     Spoke with PCP and Ro Coelho in regards to this.     We recommended PACENET    Provided pt with pacenet number    Explained pacenet will cover anything on [employer] formulary (very likely including eliquis) for $15 or less    Pharmacist Tracking Tool  Reason For Outreach: Embedded Pharmacist  Demographics:  Intervention Method: In Person  Type of Intervention: New  Topics Addressed: Anticoagulation  Pharmacologic Interventions: N/A  Non-Pharmacologic Interventions: Care coordination and Cost  Time:  Direct Patient Care:  0  mins  Care Coordination:  5  mins  Recommendation Recipient: Provider  Outcome: Accepted and Pending/ Follow-up Required     Claudio Hi, PharmD, BCACP  Ambulatory Care Clinical Pharmacist

## 2024-02-01 NOTE — ASSESSMENT & PLAN NOTE
Unable to afford his prescriptions for Afib and T2DM control   Old application has    Referral to reconnect patient with office resources  PACE program information provided

## 2024-02-13 NOTE — PROGRESS NOTES
Cardiology Office Note  MD Wayne Mcgrath MD, [facility]  Ashok Sheppard DO, [facility]  MD Edna Harding DO, [facility]  Usman Mcdonald DO, [facility]  ----------------------------------------------------------------  [address] [address]    Gulshan Arias 63 y.o. male MRN: [id number]  Unit/Bed#:  Encounter: [id number]      History of Present Illness:  It was a pleasure to see Gulshan Arias in the office today for initial CV evaluation. He has a past medical history of paroxysmal atrial fibrillation, hypertension, dyslipidemia, type 2 diabetes mellitus, GERD and tobacco use.  He establish care with us in February 2024.  Patient was seen by Dr. Lakhani in 2018 for his atrial fibrillation.  At that time, he was recommended for Holter monitor and sleep study.  He was started on Eliquis and subsequently was lost to follow-up.  Patient unfortunately had been unable to afford Eliquis due to the high cost.  Over the course of 2023 into 2024, he began to experience a chronic cough.  He continued smoking at that time and noted some significant dyspnea on exertion.  No significant chest discomfort was associated with the symptoms.  He also had some inability to climb stairs due to his exertional dyspnea.  He was seen by primary care and noted to be back in atrial fibrillation and was referred to cardiology office for evaluation.  Denies lower extremity swelling orthopnea or paroxysmal nocturnal dyspnea.  Admitted to occasional palpitations.    Review of Systems:  Review of Systems   Constitutional: Positive for malaise/fatigue. Negative for decreased appetite, fever, weight gain and weight loss.   HENT:  Negative for congestion and sore throat.    Eyes:  Negative for visual disturbance.   Cardiovascular:  Positive for dyspnea on exertion. Negative for chest pain, leg swelling, near-syncope and palpitations.   Respiratory:  Positive for  cough and shortness of breath.    Hematologic/Lymphatic: Negative for bleeding problem.   Skin:  Negative for rash.   Musculoskeletal:  Negative for myalgias and neck pain.   Gastrointestinal:  Negative for abdominal pain and nausea.   Neurological:  Negative for light-headedness and weakness.   Psychiatric/Behavioral:  Negative for depression.        Past Medical History:   Diagnosis Date    Depression     Diabetes mellitus (HCC)     Hyperlipidemia     Hypertension     Tobacco abuse        Past Surgical History:   Procedure Laterality Date    APPENDECTOMY      SHOULDER ARTHROSCOPY         Social History     Socioeconomic History    Marital status: /Civil Union     Spouse name: Not on file    Number of children: Not on file    Years of education: Not on file    Highest education level: Not on file   Occupational History    Not on file   Tobacco Use    Smoking status: Every Day     Current packs/day: 1.00     Types: Cigarettes     Passive exposure: Current    Smokeless tobacco: Current   Vaping Use    Vaping status: Never Used   Substance and Sexual Activity    Alcohol use: No    Drug use: No    Sexual activity: Not on file   Other Topics Concern    Not on file   Social History Narrative    Not on file     Social Determinants of Health     Financial Resource Strain: Low Risk  (1/4/2024)    Overall Financial Resource Strain (CARDIA)     Difficulty of Paying Living Expenses: Not hard at all   Food Insecurity: No Food Insecurity (1/4/2024)    Hunger Vital Sign     Worried About Running Out of Food in the Last Year: Never true     Ran Out of Food in the Last Year: Never true   Transportation Needs: No Transportation Needs (1/4/2024)    PRAPARE - Transportation     Lack of Transportation (Medical): No     Lack of Transportation (Non-Medical): No   Physical Activity: Not on file   Stress: Not on file   Social Connections: Not on file   Intimate Partner Violence: Not on file   Housing Stability: Not on file       No  family history on file.    No Known Allergies      Current Outpatient Medications:     ALPRAZolam (XANAX) 0.25 mg tablet, TAKE 1 TABLET BY MOUTH TWICE DAILY AS NEEDED FOR ANXIETY, Disp: 60 tablet, Rfl: 0    amLODIPine (NORVASC) 10 mg tablet, Take 1 tablet (10 mg total) by mouth daily, Disp: 90 tablet, Rfl: 1    glucose blood test strip, 1 each by Other route 2 (two) times a day Use as instructed, Disp: 100 each, Rfl: 1    Insulin Glargine Solostar (Lantus SoloStar) 100 UNIT/ML SOPN, Inject 0.28 mL (28 Units total) under the skin daily at bedtime, Disp: 21 mL, Rfl: 1    Insulin Pen Needle (TechLite Pen Needles) 31G X 8 MM MISC, Apply topically 4 (four) times a day, Disp: 360 each, Rfl: 0    Lancets Misc. (Accu-Chek FastClix Lancet) KIT, Inject under the skin 2 (two) times a day, Disp: 100 kit, Rfl: 2    losartan-hydrochlorothiazide (HYZAAR) 100-25 MG per tablet, Take 1 tablet by mouth daily, Disp: 90 tablet, Rfl: 1    metFORMIN (GLUCOPHAGE) 1000 MG tablet, Take 1 tablet (1,000 mg total) by mouth 2 (two) times a day with meals, Disp: 90 tablet, Rfl: 3    metoprolol succinate (TOPROL-XL) 25 mg 24 hr tablet, Take 1 tablet (25 mg total) by mouth daily, Disp: 90 tablet, Rfl: 3    oxyCODONE (ROXICODONE) 30 MG immediate release tablet, , Disp: , Rfl:     pantoprazole (PROTONIX) 40 mg tablet, Take 1 tablet (40 mg total) by mouth daily, Disp: 30 tablet, Rfl: 1    PARoxetine (PAXIL) 20 mg tablet, Take 1 tablet (20 mg total) by mouth daily, Disp: 30 tablet, Rfl: 2    zolpidem (AMBIEN) 10 mg tablet, Take 10 mg by mouth daily at bedtime as needed for sleep, Disp: , Rfl:     albuterol (PROVENTIL HFA,VENTOLIN HFA) 90 mcg/act inhaler, Inhale 2 puffs every 6 (six) hours as needed for wheezing or shortness of breath (Patient not taking: Reported on 2/15/2024), Disp: 8 g, Rfl: 1    apixaban (Eliquis) 5 mg, Take 1 tablet (5 mg total) by mouth 2 (two) times a day (Patient not taking: Reported on 2/15/2024), Disp: 120 tablet, Rfl: 2     "benzonatate (TESSALON PERLES) 100 mg capsule, Take 1 capsule (100 mg total) by mouth 3 (three) times a day as needed for cough (Patient not taking: Reported on 2/15/2024), Disp: 20 capsule, Rfl: 0    dulaglutide (Trulicity) 0.75 MG/0.5ML injection, Inject 0.5 mL (0.75 mg total) under the skin every 7 days (Patient not taking: Reported on 2/15/2024), Disp: 6 mL, Rfl: 1    fluticasone (FLONASE) 50 mcg/act nasal spray, 1 spray into each nostril daily (Patient not taking: Reported on 2/15/2024), Disp: 18.2 mL, Rfl: 1    fluticasone (Flovent HFA) 44 mcg/act inhaler, Inhale 2 puffs 2 (two) times a day Rinse mouth after use. (Patient not taking: Reported on 2/15/2024), Disp: 10.6 g, Rfl: 1    simvastatin (ZOCOR) 40 mg tablet, Take 1 tablet (40 mg total) by mouth daily at bedtime (Patient not taking: Reported on 2/15/2024), Disp: 90 tablet, Rfl: 1    Vitals:    02/15/24 1429   BP: 120/70   Pulse: 95   Weight: 80 kg (176 lb 6.4 oz)   Height: 5' 4\" (1.626 m)     Body mass index is 30.28 kg/m².    PHYSICAL EXAMINATION:  Gen: Awake, Alert, NAD   Head/eyes: AT/NC, pupils equal and round, Anicteric  ENT: mmm  Neck: Supple, No elevated JVP, trachea midline  Resp: Decreased breath sounds otherwise clear  CV: irreg irreg +S1, S2, No m/r/g  Abd: Soft, NT/ND + BS  Ext: no LE edema bilaterally  Neuro: Follows commands, moves all extermities  Psych: Appropriate affect, normal mood, pleasant attitude, non-combative  Skin: warm; no rash, erythema or venous stasis changes on exposed skin    --------------------------------------------------------------------------------  TREADMILL STRESS  Results for orders placed during the hospital encounter of 16    Stress test only, exercise    Narrative  [address] [id number] [phone]    Stress Electrocardiography during Exercise    Patient: GAURI HOROWITZ  MR number: [id number]  Account number: [id number]  : " 1960  Age: 55 years  Gender: Male  Status: Outpatient  Location: 138 bpmStress lab  Height: 74 in  Weight: 188 lb  BP: /    Allergies: NO KNOWN ALLERGIES    Referring Physician:  Jamison Mejia MD  Interpreting Physician:  Tan Scott MD    INDICATIONS: Detection of coronary artery disease.    HISTORY: ecu pt here for exercise stress test walked in with cane The patient  is a 55 year old  male. Chest pain status: chest pain. Coronary artery  disease risk factors: dyslipidemia, hypertension, and diabetes mellitus.  Cardiovascular history: none significant. Co-morbidity: obesity. Medications: a  calcium channel blocker, aspirin, a lipid lowering agent, and diabetic  medications.    PHYSICAL EXAM: Baseline physical exam screening: no wheezes audible.    REST ECG: Atrial fibrillation. RVR. Nonspecific ST and T wave abnormalities  were present.    PROCEDURE: The study was performed in the stress lab. Treadmill exercise  testing was performed, using the [place] protocol. Stress electrocardiographic  evaluation was performed.    HAROON PROTOCOL:  Symptoms  Baseline none  DR SCOTT CALLED TO ASSESS PT. PRE STRESS ECG PATEINT IN UNCONTROLED ATRIAL  FIBRILLATION NOT PRESENT ON ER ECG. DISCUSSING SITUATION WITH PATIENT AND  RHYTHM RETURNED TO SINUS RHYTHM. DR SCOTT CANCELLED STRESS TEST AND SENT  PATIENT TO ER TO EVALUATION OF PAF    STRESS SUMMARY: CANCELLED BY CARDIOLOGY Rapid atrial fibrillation. The baseline  ECG was abnormal due to arrhythmia. Stress cancelled.    SUMMARY:  -  ECG conclusions: The baseline ECG was abnormal due to arrhythmia. Stress  cancelled.  -  Comparisons to previous: No previous study is available for comparison.    IMPRESSIONS: Not performed secondary to rapid AF. Nondiagnostic study.    PREVIOUS STUDY COMPARISON: No previous study is available for comparison.    Prepared and signed by    Tan Scott MD  Signed 09/28/2016 14:00:43      --------------------------------------------------------------------------------  NUCLEAR STRESS TEST: No results found for this or any previous visit.    No results found for this or any previous visit.      --------------------------------------------------------------------------------  CATH:  No results found for this or any previous visit.    --------------------------------------------------------------------------------  ECHO:   Results for orders placed during the hospital encounter of 16    Echo complete with contrast if indicated    [address] [address]  [phone]    Transthoracic Echocardiogram  2D, M-mode, Doppler, and Color Doppler    Study date:  29-Sep-2016    Patient: GAURI HOROWITZ  MR number: [id number]  Account number: [id number]  : 1960  Age: 55 years  Gender: Male  Status: Inpatient  Location: Bedside  Height: 64 in  Weight: 187 lb  BP: 180/ 96 mmHg    Indications: Atrial fibrillation    Diagnoses: I48.0 - Atrial fibrillation    Sonographer:  MOSHE Colon  Referring Physician:  Tariq PA-C  Group:  [facility]  Interpreting Physician:  Edna Ac, DO    SUMMARY    LEFT VENTRICLE:  Systolic function was normal. Ejection fraction was estimated in the range of  55 % to 60 %.  There were no regional wall motion abnormalities.  Wall thickness was mildly increased.  There was mild concentric hypertrophy.    MITRAL VALVE:  There was mild regurgitation.    AORTIC VALVE:  There was trace regurgitation.    HISTORY: PRIOR HISTORY: Hypertension, hyperlipidemia, DM, tobacco abuse    PROCEDURE: The procedure was performed at the bedside. This was a routine  study. The transthoracic approach was used. The study included complete 2D  imaging, M-mode, complete spectral Doppler, and color Doppler. The heart rate  was 76 bpm, at the start of the study. Images were obtained from  the  parasternal, apical, subcostal, and suprasternal notch acoustic windows. Image  quality was adequate.    LEFT VENTRICLE: Size was normal. Systolic function was normal. Ejection  fraction was estimated in the range of 55 % to 60 %. There were no regional  wall motion abnormalities. Wall thickness was mildly increased. There was mild  concentric hypertrophy. DOPPLER: Left ventricular diastolic function parameters  were normal.    RIGHT VENTRICLE: The size was normal. Systolic function was normal. Wall  thickness was normal.    LEFT ATRIUM: Size was normal.    RIGHT ATRIUM: Size was normal.    MITRAL VALVE: Valve structure was normal. There was normal leaflet separation.  DOPPLER: The transmitral velocity was within the normal range. There was no  evidence for stenosis. There was mild regurgitation.    AORTIC VALVE: The valve was trileaflet. Leaflets exhibited normal thickness and  normal cuspal separation. DOPPLER: Transaortic velocity was within the normal  range. There was no evidence for stenosis. There was trace regurgitation.    TRICUSPID VALVE: The valve structure was normal. There was normal leaflet  separation. DOPPLER: The transtricuspid velocity was within the normal range.  There was no evidence for stenosis. There was no regurgitation.    PULMONIC VALVE: Leaflets exhibited normal thickness, no calcification, and  normal cuspal separation. DOPPLER: The transpulmonic velocity was within the  normal range. There was no regurgitation.    PERICARDIUM: There was no pericardial effusion.    AORTA: The root exhibited normal size.    SYSTEMIC VEINS: IVC: The inferior vena cava was normal in size and course.  Respirophasic changes were normal.    PULMONARY ARTERY: DOPPLER: The tricuspid jet envelope definition was inadequate  for estimation of RV systolic pressure.    SYSTEM MEASUREMENT TABLES    2D  %FS: 32 %  Ao Diam: 3.5 cm  EDV(Teich): 99.5 ml  EF Biplane: 44.9 %  EF(Teich): 60.2 %  ESV(Teich): 39.6  ml  IVSd: 1 cm  LA Area: 19.1 cm2  LA Diam: 3.2 cm  LVEDV MOD A2C: 111.6 ml  LVEDV MOD A4C: 135.4 ml  LVEDV MOD BP: 129.3 ml  LVEF MOD A2C: 39 %  LVEF MOD A4C: 47.2 %  LVESV MOD A2C: 68 ml  LVESV MOD A4C: 71.5 ml  LVESV MOD BP: 71.3 ml  LVIDd: 4.6 cm  LVIDs: 3.2 cm  LVLd A2C: 8.4 cm  LVLd A4C: 9.4 cm  LVLs A2C: 7.7 cm  LVLs A4C: 7.3 cm  LVPWd: 1 cm  RA Area: 11.2 cm2  RVIDd: 3 cm  SV MOD A2C: 43.5 ml  SV MOD A4C: 63.9 ml  SV(Teich): 59.9 ml    CW  AR Dec [place]: 2.3 m/s2  AR Dec Time: 1633.1 ms  AR PHT: 473.6 ms  AR Vmax: 3.8 m/s  AR maxP.5 mmHg    MM  TAPSE: 2.2 cm    PW  E': 0.1 m/s  E/E': 10.2  MV A Francois: 0.7 m/s  MV Dec [place]: 5.1 m/s2  MV DecT: 160.6 ms  MV E Francois: 0.8 m/s  MV E/A Ratio: 1.3  MV PHT: 46.6 ms  MVA By PHT: 4.7 cm2    [facility]    Prepared and electronically signed by    Edna Ac DO  Signed 29-Sep-2016 15:25:44    No results found for this or any previous visit.    --------------------------------------------------------------------------------  HOLTER  No results found for this or any previous visit.    Results for orders placed during the hospital encounter of 18    Holter monitor - 48 hour    Narrative  PATIENT NAME:  Gulshan Arias    AGE:  57 y.o.       Sex:  male  MRN:  [id number]      PROCEDURE: Holter monitor - 48 hour      INDICATIONS:  Paroxysmal atrial fibrillation    HOLTER FINDINGS:    The patient was monitored for 48 continuous hours.  This revealed the patient to be in sinus rhythm with an average rate of 77 BPM; a minimum rate of 58 BPM; and a maximum rate of 146 BPM.    There were a total of 329 premature ventricular contractions.  There were no ventricular runs or arrhythmias. There were a total of 283 premature atrial contractions.  There was 1 atrial couplet, but no supraventricular runs or arrhythmias.  There was no evidence of atrial fibrillation or atrial flutter.  There was no significant  "bradycardia.  There was no evidence of heart block, and no significant pauses were seen.  There was approximately 1 hr of tachycardia. This was all sinus tachycardia.    Impression  1. The patient was in Sinus rhythm throughout the duration of the study.  2. The average heart rate was 77 bpm.  3. Occasional PACs and PVCs  4. No arrhythmias were noted.  5. No symptoms reported.    --------------------------------------------------------------------------------  CAROTIDS  No results found for this or any previous visit.     --------------------------------------------------------------------------------  ECGs:  Results for orders placed or performed in visit on 02/15/24   POCT ECG    Impression    Atrial fibrillation 95 bpm, PVCs or aberrant conduction, nonspecific ST-T wave abnormalities, possible prolonged QT        Lab Results   Component Value Date    WBC 13.55 (H) 02/06/2022    HGB 13.4 02/06/2022    HCT 39.7 02/06/2022    MCV 91 02/06/2022     02/06/2022      Lab Results   Component Value Date    SODIUM 138 01/30/2024    K 3.7 01/30/2024    CL 95 (L) 01/30/2024    CO2 33 (H) 01/30/2024    BUN 24 01/30/2024    CREATININE 1.51 (H) 01/30/2024    GLUC 213 (H) 02/06/2022    CALCIUM 9.2 01/30/2024      Lab Results   Component Value Date    HGBA1C 9.5 (A) 01/04/2024      No results found for: \"CHOL\"  Lab Results   Component Value Date    HDL 30 (L) 01/30/2024    HDL 24 (L) 09/29/2016     Lab Results   Component Value Date    LDLCALC  01/30/2024      Comment:      Calculated LDL invalid, triglycerides >400 mg/dl  This screening LDL is a calculated result.   It does not have the accuracy of the Direct Measured LDL in the monitoring of patients with hyperlipidemia and/or statin therapy.   Direct Measure LDL (RAH679) must be ordered separately in these patients.    LDLCALC 94 09/29/2016     Lab Results   Component Value Date    TRIG 417 (H) 01/30/2024    TRIG 328 (H) 09/29/2016     No results found for: \"CHOLHDL\" "   Lab Results   Component Value Date    INR 0.95 12/01/2017    INR 0.89 09/28/2016    PROTIME 12.7 12/01/2017    PROTIME 12.1 09/28/2016        1. Paroxysmal atrial fibrillation (HCC)  -     POCT ECG  -     metoprolol succinate (TOPROL-XL) 25 mg 24 hr tablet; Take 1 tablet (25 mg total) by mouth daily  -     AMB extended holter monitor; Future; Expected date: 02/15/2024    2. Essential hypertension  -     Ambulatory Referral to Cardiology  -     POCT ECG    3. Dyslipidemia    4. Type 2 diabetes mellitus without complication, with long-term current use of insulin (HCC)    5. Chronic cough  -     X-ray chest 2 views; Future; Expected date: 02/15/2024  -     Ambulatory referral to Pulmonology; Future    6. Dyspnea on exertion  -     NM myocardial perfusion spect (rx stress and/or rest); Future; Expected date: 02/15/2024    7. Tobacco use  -     Ambulatory referral to Pulmonology; Future        IMPRESSION:    Paroxysmal atrial fibrillation on Eliquis  Holter w/ SR avg HR 77 bpm, occasional PACs and PVCs, December 2018  Hypertension  LVEF 55-60%, mild LVH, normal diastolic function, mild MR, trace TR, September 2016  Dyslipidemia  Type 2 diabetes mellitus  Tobacco use    PLAN:  It was a pleasure to see Gulshan in the office today for initial CV evaluation.  He is here today due to his history of atrial fibrillation.  The patient has been having shortness of breath and cough that have been going on for the past several months.  He has no chest pain concerning for angina and no signs or symptoms of heart failure.  Clinically he examines to be euvolemic.  Blood pressure is stable and heart rate is upper limits of normal.  He is tolerating his current medications without any adverse effects.  Unfortunately, Eliquis cost has been high and he has not been able to take the medication for his atrial fibrillation.  He has been having significant coughing on a regular basis and this has been interfering with his activities of  "daily living.  Based on his clinical presentation, I have the following recommendations:    1.  Recommend checking a 1 week monitor to assess the extent and heart rates of his atrial fibrillation. Check TSH and BMP.  2.  Check 2D echocardiogram to assess his cardiac structure and function.  3.  Given his atrial fibrillation and shortness of breath with activity, I would check pharmacologic nuclear stress test to assess for any evidence of underlying myocardial ischemia.  4.  Start metoprolol 25 mg once daily to help with heart rate control.  5.  Start Xarelto 15 mg once daily with his mild kidney dysfunction.  If there is difficulty with affording Xarelto, we will change to Pradaxa and if there is issues with Pradaxa, we will try warfarin.  6.  Continue current blood pressure medications including losartan, hydrochlorothiazide and amlodipine.  7.  Continue simvastatin.  Goal LDL is less than 70 mg/dL.  Repeat lipid panel in 3 to 6 months.  8.  Follow-up with primary care for diabetic management.  9.  I have placed a referral to pulmonology given his progressive coughing and shortness of breath with smoking.  I will order a chest x-ray before his evaluation by pulmonology for them to review.  10.  If his symptoms worsen in severity or frequency, recommend seeking immediate medical attention/dial 911.  11.  We will follow-up in the office to discuss the possibility of cardioversion or any additional testing as necessary.    As always, please do not hesitate to call with any questions.    Portions of the record may have been created with voice recognition software. Occasional wrong word or \"sound a like\" substitutions may have occurred due to the inherent limitations of voice recognition software. Read the chart carefully and recognize, using context, where substitutions have occurred.      Signed: Ashok Sheppard DO, FACC, LEIGH ANN, FACP  "

## 2024-02-15 ENCOUNTER — CONSULT (OUTPATIENT)
Dept: CARDIOLOGY CLINIC | Facility: CLINIC | Age: 64
End: 2024-02-15
Payer: MEDICARE

## 2024-02-15 VITALS
HEART RATE: 95 BPM | WEIGHT: 176.4 LBS | BODY MASS INDEX: 30.11 KG/M2 | HEIGHT: 64 IN | SYSTOLIC BLOOD PRESSURE: 120 MMHG | DIASTOLIC BLOOD PRESSURE: 70 MMHG

## 2024-02-15 DIAGNOSIS — Z79.4 TYPE 2 DIABETES MELLITUS WITHOUT COMPLICATION, WITH LONG-TERM CURRENT USE OF INSULIN (HCC): ICD-10-CM

## 2024-02-15 DIAGNOSIS — R05.3 CHRONIC COUGH: ICD-10-CM

## 2024-02-15 DIAGNOSIS — Z72.0 TOBACCO USE: ICD-10-CM

## 2024-02-15 DIAGNOSIS — E78.5 DYSLIPIDEMIA: ICD-10-CM

## 2024-02-15 DIAGNOSIS — I48.0 PAROXYSMAL ATRIAL FIBRILLATION (HCC): Primary | ICD-10-CM

## 2024-02-15 DIAGNOSIS — E11.9 TYPE 2 DIABETES MELLITUS WITHOUT COMPLICATION, WITH LONG-TERM CURRENT USE OF INSULIN (HCC): ICD-10-CM

## 2024-02-15 DIAGNOSIS — I10 ESSENTIAL HYPERTENSION: ICD-10-CM

## 2024-02-15 DIAGNOSIS — R06.09 DYSPNEA ON EXERTION: ICD-10-CM

## 2024-02-15 PROCEDURE — 93000 ELECTROCARDIOGRAM COMPLETE: CPT | Performed by: INTERNAL MEDICINE

## 2024-02-15 PROCEDURE — 99204 OFFICE O/P NEW MOD 45 MIN: CPT | Performed by: INTERNAL MEDICINE

## 2024-02-15 RX ORDER — METOPROLOL SUCCINATE 25 MG/1
25 TABLET, EXTENDED RELEASE ORAL DAILY
Qty: 90 TABLET | Refills: 3 | Status: SHIPPED | OUTPATIENT
Start: 2024-02-15 | End: 2024-05-15

## 2024-02-20 ENCOUNTER — TELEPHONE (OUTPATIENT)
Dept: INTERNAL MEDICINE CLINIC | Facility: OTHER | Age: 64
End: 2024-02-20

## 2024-02-21 ENCOUNTER — HOSPITAL ENCOUNTER (OUTPATIENT)
Dept: RADIOLOGY | Facility: HOSPITAL | Age: 64
Discharge: HOME | End: 2024-02-21
Attending: INTERNAL MEDICINE
Payer: MEDICARE

## 2024-02-21 ENCOUNTER — HOSPITAL ENCOUNTER (OUTPATIENT)
Dept: NON INVASIVE DIAGNOSTICS | Facility: HOSPITAL | Age: 64
Discharge: HOME | End: 2024-02-21
Attending: INTERNAL MEDICINE
Payer: MEDICARE

## 2024-02-21 VITALS — WEIGHT: 176 LBS | BODY MASS INDEX: 30.05 KG/M2 | HEIGHT: 64 IN

## 2024-02-21 DIAGNOSIS — R06.09 DYSPNEA ON EXERTION: ICD-10-CM

## 2024-02-21 PROBLEM — R05.9 COUGH: Status: RESOLVED | Noted: 2020-11-09 | Resolved: 2024-02-21

## 2024-02-21 LAB
MAX HR PERCENT: 62 %
MAX HR: 98 BPM
NUC STRESS EJECTION FRACTION: 52 %
RATE PRESSURE PRODUCT: 194
SL CV REST NUCLEAR ISOTOPE DOSE: 10 MCI
SL CV STRESS NUCLEAR ISOTOPE DOSE: 30.8 MCI
SL CV STRESS RECOVERY BP: NORMAL MMHG
SL CV STRESS RECOVERY HR: 90 BPM
SL CV STRESS RECOVERY O2 SAT: 98 %
STRESS ANGINA INDEX: 1
STRESS BASELINE BP: NORMAL MMHG
STRESS BASELINE HR: 81 BPM
STRESS O2 SAT REST: 97 %
STRESS PEAK HR: 97 BPM
STRESS POST ESTIMATED WORKLOAD: 1 METS
STRESS POST O2 SAT PEAK: 98 %
STRESS POST PEAK BP: 2 MMHG
STRESS/REST PERFUSION RATIO: 1.07

## 2024-02-21 PROCEDURE — G1004 CDSM NDSC: HCPCS

## 2024-02-21 PROCEDURE — 78452 HT MUSCLE IMAGE SPECT MULT: CPT

## 2024-02-21 PROCEDURE — 93018 CV STRESS TEST I&R ONLY: CPT | Performed by: INTERNAL MEDICINE

## 2024-02-21 PROCEDURE — 78452 HT MUSCLE IMAGE SPECT MULT: CPT | Performed by: INTERNAL MEDICINE

## 2024-02-21 PROCEDURE — 93017 CV STRESS TEST TRACING ONLY: CPT

## 2024-02-21 PROCEDURE — A9502 TC99M TETROFOSMIN: HCPCS

## 2024-02-21 PROCEDURE — 93016 CV STRESS TEST SUPVJ ONLY: CPT | Performed by: INTERNAL MEDICINE

## 2024-02-21 RX ORDER — REGADENOSON 0.08 MG/ML
0.4 INJECTION, SOLUTION INTRAVENOUS ONCE
Status: DISCONTINUED | OUTPATIENT
Start: 2024-02-21 | End: 2024-02-22 | Stop reason: HOSPADM

## 2024-02-22 LAB
CHEST PAIN STATEMENT: NORMAL
MAX DIASTOLIC BP: 90 MMHG
MAX PREDICTED HEART RATE: 158 BPM
PROTOCOL NAME: NORMAL
REASON FOR TERMINATION: NORMAL
STRESS POST EXERCISE DUR MIN: 3 MIN
STRESS POST EXERCISE DUR SEC: 3 SEC
STRESS POST PEAK HR: 98 BPM
STRESS POST PEAK SYSTOLIC BP: 152 MMHG
TARGET HR FORMULA: NORMAL
TEST INDICATION: NORMAL

## 2024-03-15 ENCOUNTER — CLINICAL SUPPORT (OUTPATIENT)
Dept: CARDIOLOGY CLINIC | Facility: CLINIC | Age: 64
End: 2024-03-15
Payer: MEDICARE

## 2024-03-15 ENCOUNTER — TELEPHONE (OUTPATIENT)
Dept: INTERNAL MEDICINE CLINIC | Facility: OTHER | Age: 64
End: 2024-03-15

## 2024-03-15 DIAGNOSIS — I48.0 PAROXYSMAL ATRIAL FIBRILLATION (HCC): ICD-10-CM

## 2024-03-15 DIAGNOSIS — I48.0 PAROXYSMAL ATRIAL FIBRILLATION (HCC): Primary | ICD-10-CM

## 2024-03-15 PROCEDURE — 93244 EXT ECG>48HR<7D REV&INTERPJ: CPT | Performed by: INTERNAL MEDICINE

## 2024-03-15 NOTE — TELEPHONE ENCOUNTER
Per Dr. Sheppard, he would like patient to be started on antiarrhythmic drug for afib. Can double up on metoprolol if patient not able to get the amiodarone right away. He tried reaching out to patient but could not get a hold of patient and asked I try. He would also like patient to be referred back to Dr. Lakhani at .     I placed call to patient. I spoke with spouse. Spouse says he can start the amiodarone. I told her to take 2 metoprolol until he gets the new medication and I will tell Dr. Sheppard to order it.     [place] text sent to Dr. Sheppard.

## 2024-03-15 NOTE — TELEPHONE ENCOUNTER
Requested medication(s) are due for refill today: No  Patient has already received a courtesy refill: No  Other reason request has been forwarded to provider:  New script, please review and sign

## 2024-03-15 NOTE — TELEPHONE ENCOUNTER
Patient missed his 03.12 visit for dm education. Called pt to reschedule and spoke to wife. Pt is currently not home. Per wife the 03.12 visit was not noted. Wife would like to speak to  once he gets home to see if he is interested in the dm education and will call give me a call.

## 2024-03-18 RX ORDER — AMIODARONE HYDROCHLORIDE 200 MG/1
200 TABLET ORAL DAILY
Qty: 90 TABLET | Refills: 1 | Status: SHIPPED | OUTPATIENT
Start: 2024-03-18

## 2024-03-18 RX ORDER — AMIODARONE HYDROCHLORIDE 200 MG/1
200 TABLET ORAL 3 TIMES DAILY
Qty: 21 TABLET | Refills: 0 | Status: SHIPPED | OUTPATIENT
Start: 2024-03-18

## 2024-03-23 DIAGNOSIS — I48.0 PAROXYSMAL ATRIAL FIBRILLATION (HCC): Primary | ICD-10-CM

## 2024-03-27 ENCOUNTER — TELEPHONE (OUTPATIENT)
Dept: CARDIOLOGY CLINIC | Facility: CLINIC | Age: 64
End: 2024-03-27

## 2024-03-27 ENCOUNTER — HOSPITAL ENCOUNTER (EMERGENCY)
Facility: HOSPITAL | Age: 64
Discharge: HOME | End: 2024-03-28
Attending: EMERGENCY MEDICINE
Payer: MEDICARE

## 2024-03-27 ENCOUNTER — HOSPITAL ENCOUNTER (OUTPATIENT)
Dept: NON INVASIVE DIAGNOSTICS | Facility: HOSPITAL | Age: 64
Discharge: HOME | End: 2024-03-27
Payer: MEDICARE

## 2024-03-27 VITALS
DIASTOLIC BLOOD PRESSURE: 70 MMHG | HEIGHT: 64 IN | BODY MASS INDEX: 30.05 KG/M2 | SYSTOLIC BLOOD PRESSURE: 120 MMHG | WEIGHT: 176 LBS | HEART RATE: 93 BPM

## 2024-03-27 DIAGNOSIS — K59.00 CONSTIPATION: ICD-10-CM

## 2024-03-27 DIAGNOSIS — M54.9 BACK PAIN: ICD-10-CM

## 2024-03-27 DIAGNOSIS — I48.0 PAROXYSMAL A-FIB (HCC): ICD-10-CM

## 2024-03-27 DIAGNOSIS — E86.0 DEHYDRATION: ICD-10-CM

## 2024-03-27 DIAGNOSIS — R10.9 ABDOMINAL PAIN: Primary | ICD-10-CM

## 2024-03-27 LAB
ALBUMIN SERPL BCP-MCNC: 4.8 G/DL (ref 3.5–5)
ALP SERPL-CCNC: 60 U/L (ref 34–104)
ALT SERPL W P-5'-P-CCNC: 18 U/L (ref 7–52)
ANION GAP SERPL CALCULATED.3IONS-SCNC: 11 MMOL/L (ref 4–13)
AORTIC ROOT: 3.2 CM
APICAL FOUR CHAMBER EJECTION FRACTION: 55 %
APTT PPP: 28 SECONDS (ref 23–37)
AST SERPL W P-5'-P-CCNC: 24 U/L (ref 13–39)
BASOPHILS # BLD AUTO: 0.05 THOUSANDS/ÂΜL (ref 0–0.1)
BASOPHILS NFR BLD AUTO: 0 % (ref 0–1)
BILIRUB SERPL-MCNC: 0.87 MG/DL (ref 0.2–1)
BSA FOR ECHO PROCEDURE: 1.85 M2
BUN SERPL-MCNC: 20 MG/DL (ref 5–25)
CALCIUM SERPL-MCNC: 10.7 MG/DL (ref 8.4–10.2)
CHLORIDE SERPL-SCNC: 94 MMOL/L (ref 96–108)
CO2 SERPL-SCNC: 30 MMOL/L (ref 21–32)
CREAT SERPL-MCNC: 2 MG/DL (ref 0.6–1.3)
E WAVE DECELERATION TIME: 155 MS
E/A RATIO: 3.14
EOSINOPHIL # BLD AUTO: 0.12 THOUSAND/ÂΜL (ref 0–0.61)
EOSINOPHIL NFR BLD AUTO: 1 % (ref 0–6)
ERYTHROCYTE [DISTWIDTH] IN BLOOD BY AUTOMATED COUNT: 13.8 % (ref 11.6–15.1)
FRACTIONAL SHORTENING: 33 (ref 28–44)
GFR SERPL CREATININE-BSD FRML MDRD: 34 ML/MIN/1.73SQ M
GLUCOSE SERPL-MCNC: 246 MG/DL (ref 65–140)
HCT VFR BLD AUTO: 43.9 % (ref 36.5–49.3)
HGB BLD-MCNC: 15 G/DL (ref 12–17)
IMM GRANULOCYTES # BLD AUTO: 0.08 THOUSAND/UL (ref 0–0.2)
IMM GRANULOCYTES NFR BLD AUTO: 1 % (ref 0–2)
INR PPP: 1.33 (ref 0.84–1.19)
INTERVENTRICULAR SEPTUM IN DIASTOLE (PARASTERNAL SHORT AXIS VIEW): 1.3 CM
INTERVENTRICULAR SEPTUM: 1.3 CM (ref 0.6–1.1)
LAAS-AP2: 20.2 CM2
LAAS-AP4: 20 CM2
LACTATE SERPL-SCNC: 1.5 MMOL/L (ref 0.5–2)
LEFT ATRIUM SIZE: 3.7 CM
LEFT ATRIUM VOLUME (MOD BIPLANE): 56 ML
LEFT ATRIUM VOLUME INDEX (MOD BIPLANE): 30.3 ML/M2
LEFT INTERNAL DIMENSION IN SYSTOLE: 2.9 CM (ref 2.1–4)
LEFT VENTRICULAR INTERNAL DIMENSION IN DIASTOLE: 4.3 CM (ref 3.5–6)
LEFT VENTRICULAR POSTERIOR WALL IN END DIASTOLE: 1.3 CM
LEFT VENTRICULAR STROKE VOLUME: 50 ML
LVSV (TEICH): 50 ML
LYMPHOCYTES # BLD AUTO: 1.92 THOUSANDS/ÂΜL (ref 0.6–4.47)
LYMPHOCYTES NFR BLD AUTO: 14 % (ref 14–44)
MAGNESIUM SERPL-MCNC: 2 MG/DL (ref 1.9–2.7)
MCH RBC QN AUTO: 29.8 PG (ref 26.8–34.3)
MCHC RBC AUTO-ENTMCNC: 34.2 G/DL (ref 31.4–37.4)
MCV RBC AUTO: 87 FL (ref 82–98)
MONOCYTES # BLD AUTO: 0.94 THOUSAND/ÂΜL (ref 0.17–1.22)
MONOCYTES NFR BLD AUTO: 7 % (ref 4–12)
MV E'TISSUE VEL-SEP: 9 CM/S
MV PEAK A VEL: 0.28 M/S
MV PEAK E VEL: 88 CM/S
MV STENOSIS PRESSURE HALF TIME: 45 MS
MV VALVE AREA P 1/2 METHOD: 4.89
NEUTROPHILS # BLD AUTO: 10.87 THOUSANDS/ÂΜL (ref 1.85–7.62)
NEUTS SEG NFR BLD AUTO: 77 % (ref 43–75)
NRBC BLD AUTO-RTO: 0 /100 WBCS
PLATELET # BLD AUTO: 255 THOUSANDS/UL (ref 149–390)
PMV BLD AUTO: 10.7 FL (ref 8.9–12.7)
POTASSIUM SERPL-SCNC: 4.1 MMOL/L (ref 3.5–5.3)
PROT SERPL-MCNC: 8.3 G/DL (ref 6.4–8.4)
PROTHROMBIN TIME: 16.7 SECONDS (ref 11.6–14.5)
RA PRESSURE ESTIMATED: 3 MMHG
RBC # BLD AUTO: 5.04 MILLION/UL (ref 3.88–5.62)
RIGHT ATRIUM AREA SYSTOLE A4C: 16.3 CM2
RIGHT VENTRICLE ID DIMENSION: 2.8 CM
SL CV LEFT ATRIUM LENGTH A2C: 6.1 CM
SL CV LV EF: 55
SL CV PED ECHO LEFT VENTRICLE DIASTOLIC VOLUME (MOD BIPLANE) 2D: 82 ML
SL CV PED ECHO LEFT VENTRICLE SYSTOLIC VOLUME (MOD BIPLANE) 2D: 32 ML
SODIUM SERPL-SCNC: 135 MMOL/L (ref 135–147)
TRICUSPID ANNULAR PLANE SYSTOLIC EXCURSION: 2.1 CM
WBC # BLD AUTO: 13.98 THOUSAND/UL (ref 4.31–10.16)

## 2024-03-27 PROCEDURE — 93306 TTE W/DOPPLER COMPLETE: CPT

## 2024-03-27 PROCEDURE — 96361 HYDRATE IV INFUSION ADD-ON: CPT

## 2024-03-27 PROCEDURE — 96375 TX/PRO/DX INJ NEW DRUG ADDON: CPT

## 2024-03-27 PROCEDURE — 80053 COMPREHEN METABOLIC PANEL: CPT | Performed by: EMERGENCY MEDICINE

## 2024-03-27 PROCEDURE — 85025 COMPLETE CBC W/AUTO DIFF WBC: CPT | Performed by: EMERGENCY MEDICINE

## 2024-03-27 PROCEDURE — 85730 THROMBOPLASTIN TIME PARTIAL: CPT | Performed by: EMERGENCY MEDICINE

## 2024-03-27 PROCEDURE — 83735 ASSAY OF MAGNESIUM: CPT | Performed by: EMERGENCY MEDICINE

## 2024-03-27 PROCEDURE — 85610 PROTHROMBIN TIME: CPT | Performed by: EMERGENCY MEDICINE

## 2024-03-27 PROCEDURE — 99285 EMERGENCY DEPT VISIT HI MDM: CPT | Performed by: EMERGENCY MEDICINE

## 2024-03-27 PROCEDURE — 99284 EMERGENCY DEPT VISIT MOD MDM: CPT

## 2024-03-27 PROCEDURE — 36415 COLL VENOUS BLD VENIPUNCTURE: CPT | Performed by: EMERGENCY MEDICINE

## 2024-03-27 PROCEDURE — 83605 ASSAY OF LACTIC ACID: CPT | Performed by: EMERGENCY MEDICINE

## 2024-03-27 RX ORDER — FENTANYL CITRATE 50 UG/ML
50 INJECTION, SOLUTION INTRAMUSCULAR; INTRAVENOUS
Status: DISCONTINUED | OUTPATIENT
Start: 2024-03-27 | End: 2024-03-28 | Stop reason: HOSPADM

## 2024-03-27 RX ORDER — FENTANYL CITRATE 50 UG/ML
50 INJECTION, SOLUTION INTRAMUSCULAR; INTRAVENOUS ONCE
Status: DISCONTINUED | OUTPATIENT
Start: 2024-03-27 | End: 2024-03-27

## 2024-03-27 RX ORDER — ONDANSETRON 2 MG/ML
4 INJECTION INTRAMUSCULAR; INTRAVENOUS ONCE
Status: COMPLETED | OUTPATIENT
Start: 2024-03-27 | End: 2024-03-27

## 2024-03-27 RX ORDER — HYDROMORPHONE HCL/PF 1 MG/ML
1 SYRINGE (ML) INJECTION ONCE
Status: COMPLETED | OUTPATIENT
Start: 2024-03-27 | End: 2024-03-27

## 2024-03-27 RX ADMIN — SODIUM CHLORIDE 500 ML: 0.9 INJECTION, SOLUTION INTRAVENOUS at 23:35

## 2024-03-27 RX ADMIN — ONDANSETRON 4 MG: 2 INJECTION INTRAMUSCULAR; INTRAVENOUS at 23:35

## 2024-03-27 RX ADMIN — HYDROMORPHONE HYDROCHLORIDE 1 MG: 1 INJECTION, SOLUTION INTRAMUSCULAR; INTRAVENOUS; SUBCUTANEOUS at 23:31

## 2024-03-27 NOTE — TELEPHONE ENCOUNTER
----- Message from Ashok Sheppard DO sent at 3/26/2024  4:13 PM EDT -----  Please reach out to the patient and ensure that he is going for his Holter monitor and see if he is feeling better on the amiodarone.  Thank you.

## 2024-03-28 ENCOUNTER — APPOINTMENT (EMERGENCY)
Dept: CT IMAGING | Facility: HOSPITAL | Age: 64
End: 2024-03-28
Payer: MEDICARE

## 2024-03-28 VITALS
SYSTOLIC BLOOD PRESSURE: 110 MMHG | DIASTOLIC BLOOD PRESSURE: 53 MMHG | TEMPERATURE: 96.5 F | OXYGEN SATURATION: 95 % | HEART RATE: 62 BPM | RESPIRATION RATE: 18 BRPM

## 2024-03-28 LAB
ANION GAP SERPL CALCULATED.3IONS-SCNC: 9 MMOL/L (ref 4–13)
BACTERIA UR QL AUTO: ABNORMAL /HPF
BILIRUB UR QL STRIP: NEGATIVE
BUN SERPL-MCNC: 20 MG/DL (ref 5–25)
CALCIUM SERPL-MCNC: 9.7 MG/DL (ref 8.4–10.2)
CHLORIDE SERPL-SCNC: 97 MMOL/L (ref 96–108)
CLARITY UR: CLEAR
CO2 SERPL-SCNC: 30 MMOL/L (ref 21–32)
COLOR UR: YELLOW
CREAT SERPL-MCNC: 1.74 MG/DL (ref 0.6–1.3)
GFR SERPL CREATININE-BSD FRML MDRD: 41 ML/MIN/1.73SQ M
GLUCOSE SERPL-MCNC: 205 MG/DL (ref 65–140)
GLUCOSE UR STRIP-MCNC: ABNORMAL MG/DL
HGB UR QL STRIP.AUTO: NEGATIVE
HYALINE CASTS #/AREA URNS LPF: ABNORMAL /LPF
KETONES UR STRIP-MCNC: NEGATIVE MG/DL
LEUKOCYTE ESTERASE UR QL STRIP: NEGATIVE
MUCOUS THREADS UR QL AUTO: ABNORMAL
NITRITE UR QL STRIP: NEGATIVE
NON-SQ EPI CELLS URNS QL MICRO: ABNORMAL /HPF
PH UR STRIP.AUTO: 7 [PH]
POTASSIUM SERPL-SCNC: 3.8 MMOL/L (ref 3.5–5.3)
PROT UR STRIP-MCNC: ABNORMAL MG/DL
RBC #/AREA URNS AUTO: ABNORMAL /HPF
SODIUM SERPL-SCNC: 136 MMOL/L (ref 135–147)
SP GR UR STRIP.AUTO: 1.02 (ref 1–1.03)
UROBILINOGEN UR STRIP-ACNC: <2 MG/DL
WBC #/AREA URNS AUTO: ABNORMAL /HPF

## 2024-03-28 PROCEDURE — 81001 URINALYSIS AUTO W/SCOPE: CPT | Performed by: EMERGENCY MEDICINE

## 2024-03-28 PROCEDURE — 96376 TX/PRO/DX INJ SAME DRUG ADON: CPT

## 2024-03-28 PROCEDURE — 36415 COLL VENOUS BLD VENIPUNCTURE: CPT | Performed by: EMERGENCY MEDICINE

## 2024-03-28 PROCEDURE — 96375 TX/PRO/DX INJ NEW DRUG ADDON: CPT

## 2024-03-28 PROCEDURE — 96365 THER/PROPH/DIAG IV INF INIT: CPT

## 2024-03-28 PROCEDURE — 74174 CTA ABD&PLVS W/CONTRAST: CPT

## 2024-03-28 PROCEDURE — 80048 BASIC METABOLIC PNL TOTAL CA: CPT | Performed by: EMERGENCY MEDICINE

## 2024-03-28 RX ORDER — POLYETHYLENE GLYCOL 3350 17 G/17G
17 POWDER, FOR SOLUTION ORAL DAILY
Qty: 85 G | Refills: 0 | Status: SHIPPED | OUTPATIENT
Start: 2024-03-28 | End: 2024-04-02

## 2024-03-28 RX ORDER — ENEMA 19; 7 G/133ML; G/133ML
1 ENEMA RECTAL ONCE
Status: DISCONTINUED | OUTPATIENT
Start: 2024-03-28 | End: 2024-03-28 | Stop reason: HOSPADM

## 2024-03-28 RX ORDER — MAGNESIUM CARB/ALUMINUM HYDROX 105-160MG
296 TABLET,CHEWABLE ORAL ONCE
Status: COMPLETED | OUTPATIENT
Start: 2024-03-28 | End: 2024-03-28

## 2024-03-28 RX ORDER — LIDOCAINE HYDROCHLORIDE 20 MG/ML
1 JELLY TOPICAL ONCE
Status: COMPLETED | OUTPATIENT
Start: 2024-03-28 | End: 2024-03-28

## 2024-03-28 RX ORDER — SODIUM CHLORIDE, SODIUM GLUCONATE, SODIUM ACETATE, POTASSIUM CHLORIDE, MAGNESIUM CHLORIDE, SODIUM PHOSPHATE, DIBASIC, AND POTASSIUM PHOSPHATE .53; .5; .37; .037; .03; .012; .00082 G/100ML; G/100ML; G/100ML; G/100ML; G/100ML; G/100ML; G/100ML
1000 INJECTION, SOLUTION INTRAVENOUS ONCE
Status: COMPLETED | OUTPATIENT
Start: 2024-03-28 | End: 2024-03-28

## 2024-03-28 RX ORDER — POLYETHYLENE GLYCOL 3350 17 G/17G
17 POWDER, FOR SOLUTION ORAL ONCE
Status: COMPLETED | OUTPATIENT
Start: 2024-03-28 | End: 2024-03-28

## 2024-03-28 RX ORDER — ONDANSETRON 4 MG/1
4 TABLET, ORALLY DISINTEGRATING ORAL EVERY 6 HOURS PRN
Qty: 12 TABLET | Refills: 0 | Status: SHIPPED | OUTPATIENT
Start: 2024-03-28 | End: 2024-03-31

## 2024-03-28 RX ADMIN — POLYETHYLENE GLYCOL 3350 17 G: 17 POWDER, FOR SOLUTION ORAL at 05:20

## 2024-03-28 RX ADMIN — IOHEXOL 85 ML: 350 INJECTION, SOLUTION INTRAVENOUS at 00:28

## 2024-03-28 RX ADMIN — BE HEALTH MAGNESIUM CITRATE ORAL SOLUTION - LEMON 296 ML: 1.75 LIQUID ORAL at 05:15

## 2024-03-28 RX ADMIN — SODIUM CHLORIDE, SODIUM GLUCONATE, SODIUM ACETATE, POTASSIUM CHLORIDE, MAGNESIUM CHLORIDE, SODIUM PHOSPHATE, DIBASIC, AND POTASSIUM PHOSPHATE 1000 ML: .53; .5; .37; .037; .03; .012; .00082 INJECTION, SOLUTION INTRAVENOUS at 01:45

## 2024-03-28 RX ADMIN — LIDOCAINE HYDROCHLORIDE 1 APPLICATION: 20 JELLY TOPICAL at 03:17

## 2024-03-28 RX ADMIN — FENTANYL CITRATE 50 MCG: 50 INJECTION INTRAMUSCULAR; INTRAVENOUS at 02:05

## 2024-03-28 RX ADMIN — FENTANYL CITRATE 50 MCG: 50 INJECTION INTRAMUSCULAR; INTRAVENOUS at 03:58

## 2024-03-28 NOTE — ED PROVIDER NOTES
History  Chief Complaint   Patient presents with    Abdominal Pain    Back Pain     Pt with abd pain and flank pain started 1 hr ago.     Patient is a 63 year old male brought in by wife for back pain radiating in to anus and abdomen. Patient is Panamanian speaking only and used  services. Patient has a history of afib on Eliquis, Diabetes, GERD, HTN, anxiety coming in with reports he was trying to have a BM about an hour before coming into the ER. He then had onset of back pain and point to lumbar spine. The pain radiated into his anus and abdomen. He did have 2 episodes of non bilious or coffee ground emesis. No diarrhea and no melena. He feels nauseated and continued pain described as aching full pain. No hematuria, no dysuria. He was tolerating po well throughout the day. Patient reports he is compliant with his medications. No recent travel or trauma.no pain into bilateral le's. No saddle anesthesia or urinary retention      History provided by:  Patient, medical records and spouse   used: Yes (Milla [id number])    Abdominal Pain  Pain location:  Generalized  Pain quality: aching and fullness    Pain radiates to:  Anus  Pain severity:  Moderate  Onset quality:  Sudden  Duration:  2 hours  Timing:  Constant  Progression:  Unchanged  Chronicity:  New  Context comment:  BM  Relieved by:  Nothing  Worsened by:  Nothing  Ineffective treatments:  None tried  Associated symptoms: hematochezia, nausea and vomiting    Associated symptoms: no anorexia, no belching, no chest pain, no chills, no constipation, no cough, no diarrhea, no dysuria, no fatigue, no fever, no flatus, no hematemesis, no hematuria, no melena, no shortness of breath and no sore throat    Risk factors: being elderly and obesity    Risk factors: no alcohol abuse, no aspirin use, has not had multiple surgeries, no NSAID use and no recent hospitalization        Prior to Admission Medications   Prescriptions Last Dose Informant  Patient Reported? Taking?   ALPRAZolam (XANAX) 0.25 mg tablet  Spouse/Significant Other No No   Sig: TAKE 1 TABLET BY MOUTH TWICE DAILY AS NEEDED FOR ANXIETY   Insulin Glargine Solostar (Lantus SoloStar) 100 UNIT/ML SOPN  Spouse/Significant Other No No   Sig: Inject 0.28 mL (28 Units total) under the skin daily at bedtime   Insulin Pen Needle (TechLite Pen Needles) 31G X 8 MM MISC  Spouse/Significant Other No No   Sig: Apply topically 4 (four) times a day   Lancets Misc. (Accu-Chek FastClix Lancet) KIT  Spouse/Significant Other No No   Sig: Inject under the skin 2 (two) times a day   PARoxetine (PAXIL) 20 mg tablet  Spouse/Significant Other No No   Sig: Take 1 tablet (20 mg total) by mouth daily   albuterol (PROVENTIL HFA,VENTOLIN HFA) 90 mcg/act inhaler  Spouse/Significant Other No No   Sig: Inhale 2 puffs every 6 (six) hours as needed for wheezing or shortness of breath   Patient not taking: Reported on 2/15/2024   amLODIPine (NORVASC) 10 mg tablet  Spouse/Significant Other No No   Sig: Take 1 tablet (10 mg total) by mouth daily   amiodarone 200 mg tablet   No No   Sig: Take 1 tablet (200 mg total) by mouth 3 (three) times a day For 7 days   amiodarone 200 mg tablet   No No   Sig: Take 1 tablet (200 mg total) by mouth daily Starting 3/23/24   benzonatate (TESSALON PERLES) 100 mg capsule  Spouse/Significant Other No No   Sig: Take 1 capsule (100 mg total) by mouth 3 (three) times a day as needed for cough   Patient not taking: Reported on 2/15/2024   dulaglutide (Trulicity) 0.75 MG/0.5ML injection  Spouse/Significant Other No No   Sig: Inject 0.5 mL (0.75 mg total) under the skin every 7 days   Patient not taking: Reported on 2/15/2024   fluticasone (FLONASE) 50 mcg/act nasal spray  Spouse/Significant Other No No   Si spray into each nostril daily   Patient not taking: Reported on 2/15/2024   fluticasone (Flovent HFA) 44 mcg/act inhaler  Spouse/Significant Other No No   Sig: Inhale 2 puffs 2 (two) times a day  Rinse mouth after use.   Patient not taking: Reported on 2/15/2024   glucose blood test strip  Spouse/Significant Other No No   Si each by Other route 2 (two) times a day Use as instructed   losartan-hydrochlorothiazide (HYZAAR) 100-25 MG per tablet  Spouse/Significant Other No No   Sig: Take 1 tablet by mouth daily   metFORMIN (GLUCOPHAGE) 1000 MG tablet  Spouse/Significant Other No No   Sig: Take 1 tablet (1,000 mg total) by mouth 2 (two) times a day with meals   metoprolol succinate (TOPROL-XL) 25 mg 24 hr tablet   No No   Sig: Take 1 tablet (25 mg total) by mouth daily   oxyCODONE (ROXICODONE) 30 MG immediate release tablet  Spouse/Significant Other Yes No   pantoprazole (PROTONIX) 40 mg tablet  Spouse/Significant Other No No   Sig: Take 1 tablet (40 mg total) by mouth daily   rivaroxaban (Xarelto) 15 mg tablet   No No   Sig: Take 1 tablet (15 mg total) by mouth daily with breakfast   simvastatin (ZOCOR) 40 mg tablet  Spouse/Significant Other No No   Sig: Take 1 tablet (40 mg total) by mouth daily at bedtime   Patient not taking: Reported on 2/15/2024   zolpidem (AMBIEN) 10 mg tablet  Spouse/Significant Other Yes No   Sig: Take 10 mg by mouth daily at bedtime as needed for sleep      Facility-Administered Medications: None       Past Medical History:   Diagnosis Date    Depression     Diabetes mellitus (HCC)     Hyperlipidemia     Hypertension     Tobacco abuse        Past Surgical History:   Procedure Laterality Date    APPENDECTOMY      SHOULDER ARTHROSCOPY         No family history on file.  I have reviewed and agree with the history as documented.    E-Cigarette/Vaping    E-Cigarette Use Never User      E-Cigarette/Vaping Substances    Nicotine Yes      Social History     Tobacco Use    Smoking status: Every Day     Current packs/day: 1.00     Types: Cigarettes     Passive exposure: Current    Smokeless tobacco: Current   Vaping Use    Vaping status: Never Used   Substance Use Topics    Alcohol use: No     Drug use: No       Review of Systems   Constitutional: Negative.  Negative for chills, fatigue and fever.   HENT: Negative.  Negative for ear pain and sore throat.    Eyes:  Negative for pain and visual disturbance.   Respiratory: Negative.  Negative for cough and shortness of breath.    Cardiovascular: Negative.  Negative for chest pain and palpitations.   Gastrointestinal:  Positive for abdominal pain, hematochezia, nausea and vomiting. Negative for anorexia, constipation, diarrhea, flatus, hematemesis and melena.   Genitourinary: Negative.  Negative for dysuria and hematuria.   Musculoskeletal: Negative.  Negative for arthralgias and back pain.   Skin:  Negative for color change and rash.   Neurological: Negative.  Negative for seizures and syncope.   Hematological: Negative.    Psychiatric/Behavioral: Negative.     All other systems reviewed and are negative.      Physical Exam  Physical Exam  Vitals and nursing note reviewed.   Constitutional:       General: He is not in acute distress.     Appearance: He is well-developed. He is obese.      Comments: Appears in pain   HENT:      Head: Normocephalic and atraumatic.      Comments: Patient maintaining airway and secretions. No stridor . No brawniness under tongue.       Eyes:      General: Lids are normal. Gaze aligned appropriately.      Extraocular Movements: Extraocular movements intact.      Conjunctiva/sclera: Conjunctivae normal.      Pupils: Pupils are equal, round, and reactive to light.   Cardiovascular:      Rate and Rhythm: Normal rate and regular rhythm.      Heart sounds: No murmur heard.  Pulmonary:      Effort: Pulmonary effort is normal. No respiratory distress.      Breath sounds: Normal breath sounds.   Abdominal:      General: There is distension.      Palpations: Abdomen is soft.      Tenderness: There is generalized abdominal tenderness. There is guarding. There is no right CVA tenderness, left CVA tenderness or rebound. Negative signs  include Sánchez's sign, Rovsing's sign and McBurney's sign.   Musculoskeletal:         General: No swelling.      Cervical back: Neck supple.   Skin:     General: Skin is warm and dry.      Capillary Refill: Capillary refill takes less than 2 seconds.   Neurological:      General: No focal deficit present.      Mental Status: He is alert and oriented to person, place, and time.      GCS: GCS eye subscore is 4. GCS verbal subscore is 5. GCS motor subscore is 6.      Cranial Nerves: Cranial nerves 2-12 are intact.      Sensory: Sensation is intact.      Motor: Motor function is intact.      Coordination: Coordination is intact.   Psychiatric:         Mood and Affect: Mood normal.         Behavior: Behavior normal.         Vital Signs  ED Triage Vitals   Temperature Pulse Respirations Blood Pressure SpO2   03/27/24 2304 03/27/24 2304 03/27/24 2304 03/27/24 2304 03/27/24 2304   (!) 96.5 °F (35.8 °C) 70 16 112/68 99 %      Temp Source Heart Rate Source Patient Position - Orthostatic VS BP Location FiO2 (%)   03/27/24 2304 03/27/24 2304 03/27/24 2304 03/27/24 2304 --   Oral Monitor Sitting Right arm       Pain Score       03/27/24 2331       8           Vitals:    03/27/24 2304 03/28/24 0128 03/28/24 0130 03/28/24 0215   BP: 112/68 119/64 119/64 110/53   Pulse: 70 64 66 62   Patient Position - Orthostatic VS: Sitting Sitting Lying Lying         Visual Acuity      ED Medications  Medications   ondansetron (ZOFRAN) injection 4 mg (4 mg Intravenous Given 3/27/24 2335)   sodium chloride 0.9 % bolus 500 mL (0 mL Intravenous Stopped 3/28/24 0036)   HYDROmorphone (DILAUDID) injection 1 mg (1 mg Intravenous Given 3/27/24 2331)   iohexol (OMNIPAQUE) 350 MG/ML injection (MULTI-DOSE) 85 mL (85 mL Intravenous Given 3/28/24 0028)   multi-electrolyte (ISOLYTE-S PH 7.4) bolus 1,000 mL (0 mL Intravenous Stopped 3/28/24 0307)   lidocaine (URO-JET) 2 % urethral/mucosal gel 1 Application (1 Application Urethral Given 3/28/24 5013)    polyethylene glycol (MIRALAX) packet 17 g (17 g Oral Given 3/28/24 0520)   magnesium citrate (CITROMA) oral solution 296 mL (296 mL Oral Given 3/28/24 0515)       Diagnostic Studies  Results Reviewed       Procedure Component Value Units Date/Time    Basic metabolic panel [[id number]]  (Abnormal) Collected: 03/28/24 0321    Lab Status: Final result Specimen: Blood from Arm, Right Updated: 03/28/24 0344     Sodium 136 mmol/L      Potassium 3.8 mmol/L      Chloride 97 mmol/L      CO2 30 mmol/L      ANION GAP 9 mmol/L      BUN 20 mg/dL      Creatinine 1.74 mg/dL      Glucose 205 mg/dL      Calcium 9.7 mg/dL      eGFR 41 ml/min/1.73sq m     Narrative:      National Kidney Disease Foundation guidelines for Chronic Kidney Disease (CKD):     Stage 1 with normal or high GFR (GFR > 90 mL/min/1.73 square meters)    Stage 2 Mild CKD (GFR = 60-89 mL/min/1.73 square meters)    Stage 3A Moderate CKD (GFR = 45-59 mL/min/1.73 square meters)    Stage 3B Moderate CKD (GFR = 30-44 mL/min/1.73 square meters)    Stage 4 Severe CKD (GFR = 15-29 mL/min/1.73 square meters)    Stage 5 End Stage CKD (GFR <15 mL/min/1.73 square meters)  Note: GFR calculation is accurate only with a steady state creatinine    Urine Microscopic [[id number]]  (Abnormal) Collected: 03/28/24 0125    Lab Status: Final result Specimen: Urine, Clean Catch Updated: 03/28/24 0153     RBC, UA 1-2 /hpf      WBC, UA 1-2 /hpf      Epithelial Cells None Seen /hpf      Bacteria, UA None Seen /hpf      MUCUS THREADS Occasional     Hyaline Casts, UA 25-50 /lpf     UA (URINE) with reflex to Scope [[id number]]  (Abnormal) Collected: 03/28/24 0125    Lab Status: Final result Specimen: Urine, Clean Catch Updated: 03/28/24 0136     Color, UA Yellow     Clarity, UA Clear     Specific Gravity, UA 1.017     pH, UA 7.0     Leukocytes, UA Negative     Nitrite, UA Negative     Protein, UA Trace mg/dl      Glucose, UA Trace mg/dl      Ketones, UA Negative mg/dl      Urobilinogen, UA <2.0  mg/dl      Bilirubin, UA Negative     Occult Blood, UA Negative    Lactic acid, plasma (w/reflex if result > 2.0) [[id number]]  (Normal) Collected: 03/27/24 2328    Lab Status: Final result Specimen: Blood from Arm, Right Updated: 03/27/24 2354     LACTIC ACID 1.5 mmol/L     Narrative:      Result may be elevated if tourniquet was used during collection.    Comprehensive metabolic panel [[id number]]  (Abnormal) Collected: 03/27/24 2328    Lab Status: Final result Specimen: Blood from Arm, Right Updated: 03/27/24 2354     Sodium 135 mmol/L      Potassium 4.1 mmol/L      Chloride 94 mmol/L      CO2 30 mmol/L      ANION GAP 11 mmol/L      BUN 20 mg/dL      Creatinine 2.00 mg/dL      Glucose 246 mg/dL      Calcium 10.7 mg/dL      AST 24 U/L      ALT 18 U/L      Alkaline Phosphatase 60 U/L      Total Protein 8.3 g/dL      Albumin 4.8 g/dL      Total Bilirubin 0.87 mg/dL      eGFR 34 ml/min/1.73sq m     Narrative:      National Kidney Disease Foundation guidelines for Chronic Kidney Disease (CKD):     Stage 1 with normal or high GFR (GFR > 90 mL/min/1.73 square meters)    Stage 2 Mild CKD (GFR = 60-89 mL/min/1.73 square meters)    Stage 3A Moderate CKD (GFR = 45-59 mL/min/1.73 square meters)    Stage 3B Moderate CKD (GFR = 30-44 mL/min/1.73 square meters)    Stage 4 Severe CKD (GFR = 15-29 mL/min/1.73 square meters)    Stage 5 End Stage CKD (GFR <15 mL/min/1.73 square meters)  Note: GFR calculation is accurate only with a steady state creatinine    Magnesium [[id number]]  (Normal) Collected: 03/27/24 2328    Lab Status: Final result Specimen: Blood from Arm, Right Updated: 03/27/24 2354     Magnesium 2.0 mg/dL     APTT [[id number]]  (Normal) Collected: 03/27/24 2328    Lab Status: Final result Specimen: Blood from Arm, Right Updated: 03/27/24 2352     PTT 28 seconds     Protime-INR [[id number]]  (Abnormal) Collected: 03/27/24 2328    Lab Status: Final result Specimen: Blood from Arm, Right Updated: 03/27/24 0485     Protime  "16.7 seconds      INR 1.33    CBC and differential [[id number]]  (Abnormal) Collected: 03/27/24 2328    Lab Status: Final result Specimen: Blood from Arm, Right Updated: 03/27/24 2334     WBC 13.98 Thousand/uL      RBC 5.04 Million/uL      Hemoglobin 15.0 g/dL      Hematocrit 43.9 %      MCV 87 fL      MCH 29.8 pg      MCHC 34.2 g/dL      RDW 13.8 %      MPV 10.7 fL      Platelets 255 Thousands/uL      nRBC 0 /100 WBCs      Neutrophils Relative 77 %      Immature Grans % 1 %      Lymphocytes Relative 14 %      Monocytes Relative 7 %      Eosinophils Relative 1 %      Basophils Relative 0 %      Neutrophils Absolute 10.87 Thousands/µL      Absolute Immature Grans 0.08 Thousand/uL      Absolute Lymphocytes 1.92 Thousands/µL      Absolute Monocytes 0.94 Thousand/µL      Eosinophils Absolute 0.12 Thousand/µL      Basophils Absolute 0.05 Thousands/µL                    CTA abdomen pelvis w wo contrast   Final Result by Umm Kraft MD (03/28 0147)      Unremarkable mesenteric arterial vasculature. No evidence of acute intra-abdominal or pelvic pathology         Workstation performed: [id number]                    Procedures  Procedures         ED Course  ED Course as of 03/28/24 2121   Wed Mar 27, 2024   2320 Patient is a 63 year old male here with wife coming in with sudden onset abdominal/back/rectal pain while trying to have a BM. On exam, patient appears in pain and complaining of rectal pain and diffuse abdominal pain. Will check labs, urine, and CT angio      Disclosure: Voice to text software was used in the preparation of this document and could have resulted in translational errors.      Occasional wrong word or \"sound a like\" substitutions may have occurred due to the inherent limitations of voice recognition software.  Read the chart carefully and recognize, using context, where substitutions have occurred.       I have independently reviewed external records are available to me to the level of detail possible " "within the time constraints of my patient care responsibilities in the ED.       2355 Patient with increase in Cr to 2.0 - he is receiving IVF.    Thu Mar 28, 2024   0025 Patient to CT. Pending Urine   0123 Patient sitting outside on the chair stating he \"feels something coming out of his butt.  Looking at the CT concern for fecal retainment.  Patient did improve with pain medications. Will repeat IVF a   0139 Urine without any blood.  Glucose and protein noted.   0206 CT without acute findings. However does have large rectal stool and will give enema. BMP sent.      Patient did require soapsuds enema as well as fleets enema and molasses enema.  Dr. Simpson for formal disposition    Repeat BMP improved with IVF                          SBIRT 20yo+      Flowsheet Row Most Recent Value   Initial Alcohol Screen: US AUDIT-C     1. How often do you have a drink containing alcohol? 0 Filed at: 03/28/2024 0344   2. How many drinks containing alcohol do you have on a typical day you are drinking?  0 Filed at: 03/28/2024 0344   3a. Male UNDER 65: How often do you have five or more drinks on one occasion? 0 Filed at: 03/28/2024 0344   3b. FEMALE Any Age, or MALE 65+: How often do you have 4 or more drinks on one occassion? 0 Filed at: 03/28/2024 0344   Audit-C Score 0 Filed at: 03/28/2024 0344   COLLETTE: How many times in the past year have you...    Used an illegal drug or used a prescription medication for non-medical reasons? Never Filed at: 03/28/2024 0344                      Medical Decision Making  Given history and physical and age concerning for possible AAA versus mesenteric ischemia versus renal infarct versus acute cholecystitis versus nephrolithiasis.  Will obtain a CT angio    Differential diagnosis includes but not limited to:  Appendicitis, viral syndrome, constipation, AMI, NSTEMI, pneumonia, pneuothorax, gerd, gastritis,  mesenteric ischemia, mesenteric adenitis, pancreatitis, cholecystitis, " choledocholithiasis, hepatitis, bowel obstruction, ileus, gastroenteritis, colitis, malignancy, AAA, perforation, toxicologic poisoning, renal infarct, acute kidney injury, splenic infarct, splenic injury, nephrolithiasis, UTI, muscular strain, intra-abdominal hematoma, hernia, testicular torsion, epididymitis, varicocele, hydrocele, phimosis, paraphimosis, balanitis proctitis, rectal pain, rectal prolapse, hemorrhoids, perirectal abscess, anorectal fistula       Amount and/or Complexity of Data Reviewed  Independent Historian: spouse     Details: Wife at bedside  External Data Reviewed: notes.  Labs: ordered. Decision-making details documented in ED Course.     Details: Mild leukocytosis. No bandemia. No TCP   Cr increased from 1.5 to 2.0 . Glucose elevated without AGA  LA WNL  Radiology: ordered. Decision-making details documented in ED Course.     Details: CT abdomen and pelvis interpreted by radiologist as no acute findings however on my read shows large amount of stool in rectum    Risk  Prescription drug management.             Disposition  Final diagnoses:   Abdominal pain   Back pain   Constipation   Dehydration     Time reflects when diagnosis was documented in both MDM as applicable and the Disposition within this note       Time User Action Codes Description Comment    3/28/2024  2:07 AM Bendock, Camelia L Add [R10.9] Abdominal pain     3/28/2024  2:07 AM Bendock, Camelia L Add [M54.9] Back pain     3/28/2024  2:07 AM Bendock, Camelia L Add [K59.00] Constipation     3/28/2024  2:07 AM Bendock, Camelia L Add [E86.0] Dehydration           ED Disposition       ED Disposition   Discharge    Condition   Stable    Date/Time   Thu Mar 28, 2024 0208    Comment   Gulshan Arias discharge to home/self care.                   Follow-up Information       Follow up With Specialties Details Why Contact Info Additional Information    [facility] Schedule an  appointment as soon as possible for a visit in 1 week  [address] [place] [address], [address]  [facility] [address] [phone] [facility], [address], [address], [place], [place], [address] [phone]            Discharge Medication List as of 3/28/2024  2:08 AM        START taking these medications    Details   ondansetron (Zofran ODT) 4 mg disintegrating tablet Take 1 tablet (4 mg total) by mouth every 6 (six) hours as needed for nausea or vomiting for up to 3 days, Starting Thu 3/28/2024, Until Sun 3/31/2024 at 2359, Normal      polyethylene glycol (MIRALAX) 17 g packet Take 17 g by mouth daily for 5 days, Starting u 3/28/2024, Until Tue 4/2/2024, Normal           CONTINUE these medications which have NOT CHANGED    Details   albuterol (PROVENTIL HFA,VENTOLIN HFA) 90 mcg/act inhaler Inhale 2 puffs every 6 (six) hours as needed for wheezing or shortness of breath, Starting Thu 2/1/2024, Normal      ALPRAZolam (XANAX) 0.25 mg tablet TAKE 1 TABLET BY MOUTH TWICE DAILY AS NEEDED FOR ANXIETY, Normal      !! amiodarone 200 mg tablet Take 1 tablet (200 mg total) by mouth 3 (three) times a day For 7 days, Starting Mon 3/18/2024, Normal      !! amiodarone 200 mg tablet Take 1 tablet (200 mg total) by mouth daily Starting 3/23/24, Starting Mon 3/18/2024, Normal      amLODIPine (NORVASC) 10 mg tablet Take 1 tablet (10 mg total) by mouth daily, Starting Thu 1/4/2024, Normal      benzonatate (TESSALON PERLES) 100 mg capsule Take 1 capsule (100 mg total) by mouth 3 (three) times a day as needed for cough, Starting u 2/1/2024, Normal      dulaglutide (Trulicity) 0.75 MG/0.5ML injection Inject 0.5 mL (0.75 mg total) under the skin every 7 days, Starting u 2/1/2024, Normal      fluticasone (FLONASE) 50 mcg/act nasal spray 1 spray into each nostril daily, Starting Mon 8/16/2021, Normal      fluticasone (Flovent HFA) 44 mcg/act inhaler Inhale 2 puffs 2 (two) times a day  Rinse mouth after use., Starting Thu 2/1/2024, Normal      glucose blood test strip 1 each by Other route 2 (two) times a day Use as instructed, Starting Mon 11/9/2020, Normal      Insulin Glargine Solostar (Lantus SoloStar) 100 UNIT/ML SOPN Inject 0.28 mL (28 Units total) under the skin daily at bedtime, Starting Thu 2/1/2024, Normal      Insulin Pen Needle (TechLite Pen Needles) 31G X 8 MM MISC Apply topically 4 (four) times a day, Starting Thu 2/1/2024, Until Wed 5/1/2024, Normal      Lancets Misc. (Accu-Chek FastClix Lancet) KIT Inject under the skin 2 (two) times a day, Starting Thu 2/1/2024, Normal      losartan-hydrochlorothiazide (HYZAAR) 100-25 MG per tablet Take 1 tablet by mouth daily, Starting Thu 1/4/2024, Normal      metFORMIN (GLUCOPHAGE) 1000 MG tablet Take 1 tablet (1,000 mg total) by mouth 2 (two) times a day with meals, Starting Thu 1/4/2024, Normal      metoprolol succinate (TOPROL-XL) 25 mg 24 hr tablet Take 1 tablet (25 mg total) by mouth daily, Starting Thu 2/15/2024, Until Wed 5/15/2024, Normal      oxyCODONE (ROXICODONE) 30 MG immediate release tablet Starting Sun 11/1/2020, Historical Med      pantoprazole (PROTONIX) 40 mg tablet Take 1 tablet (40 mg total) by mouth daily, Starting Thu 2/1/2024, Normal      PARoxetine (PAXIL) 20 mg tablet Take 1 tablet (20 mg total) by mouth daily, Starting Thu 1/4/2024, Normal      rivaroxaban (Xarelto) 15 mg tablet Take 1 tablet (15 mg total) by mouth daily with breakfast, Starting Thu 2/15/2024, Until Wed 5/15/2024, Normal      simvastatin (ZOCOR) 40 mg tablet Take 1 tablet (40 mg total) by mouth daily at bedtime, Starting Thu 2/1/2024, Normal      zolpidem (AMBIEN) 10 mg tablet Take 10 mg by mouth daily at bedtime as needed for sleep, Historical Med       !! - Potential duplicate medications found. Please discuss with provider.          No discharge procedures on file.    PDMP Review       None            ED Provider  Electronically Signed by              Camelia Vargas,   03/28/24 2126

## 2024-03-28 NOTE — ED NOTES
Pt ambulated to bathroom and RN instructed pt and assisted pt with milk of molasses enema     Caleb Aguilar RN  03/28/24 1127

## 2024-03-28 NOTE — DISCHARGE INSTRUCTIONS
For constipation, take the colace regularly, twice daily.     Also  start taking the Miralax daily, do not take this for more than 7 days at a time.     If, despite taking the Miralax you still do not have a bowel movement in 3 days, add the bisacodyl daily.     It is recommended that you continue taking colace after your bowel movements become regular.          Increase fiber intake by encouraging whole grains, fruits, vegetables, peanut butter, dried fruits, and salads.   Increase her fluid intake in the diet. Drink water each day in addition to liquids such as Galdamez's grape juice, pineapple juice, grapefruit juice, and white grape juice.  Decrease the child's intake of highly refined starch (e.g., pasta, pizza, macaroni, cheese, noodles, bread, and potatoes).

## 2024-03-28 NOTE — ED NOTES
Pt ambulated to restroom and this RN instructed and assisted with soap krista enema.     Heena Max RN  03/28/24 5034

## 2024-03-28 NOTE — ED NOTES
Pt able to pass small amounts of BM after soap suds enema. Pt still reporting pain at 6/10     Heena Max RN  03/28/24 [id number]

## 2024-03-28 NOTE — ED NOTES
Patient given ice chips and lip moisturizer, patient instructed that he should not eat or drinking anything at this time until cleared by provider.     Aziza Peña RN  03/27/24 5630

## 2024-04-18 ENCOUNTER — TELEPHONE (OUTPATIENT)
Dept: CARDIOLOGY CLINIC | Facility: CLINIC | Age: 64
End: 2024-04-18

## 2024-04-18 DIAGNOSIS — I48.0 PAROXYSMAL ATRIAL FIBRILLATION (HCC): Primary | ICD-10-CM

## 2024-04-18 NOTE — TELEPHONE ENCOUNTER
Haris Potts # [id number]    Spoke with wife, Jackie, advised we have been trying to reach patient, states he is out or the country until 5/3/24  Advised Dr Sheppard would like him to go for Holter monitor ASAP. She will have him get it when he returns.   States patient taking his medication and is doing well.    Transferred to  to schedule follow up with Dr Sheppard    Reviewed with Dr Sheppard.  Placed another referral to EP.

## 2024-04-18 NOTE — TELEPHONE ENCOUNTER
----- Message from Ashok Sheppard DO sent at 4/17/2024  8:38 PM EDT -----  If he cannot get a hold of the patient tomorrow, please send him a letter that his monitor is extremely abnormal and requires urgent follow-up.  Thank you.

## 2024-05-02 ENCOUNTER — TELEPHONE (OUTPATIENT)
Dept: INTERNAL MEDICINE CLINIC | Facility: OTHER | Age: 64
End: 2024-05-02

## 2024-05-02 NOTE — TELEPHONE ENCOUNTER
3rd message: left detailed message in Hungarian. Requested pt return my call to set up visit for DM nutrition education

## 2024-06-02 ENCOUNTER — HOSPITAL ENCOUNTER (INPATIENT)
Facility: HOSPITAL | Age: 64
LOS: 2 days | Discharge: HOME | DRG: 322 | End: 2024-06-04
Attending: EMERGENCY MEDICINE | Admitting: INTERNAL MEDICINE
Payer: MEDICARE

## 2024-06-02 ENCOUNTER — APPOINTMENT (EMERGENCY)
Dept: RADIOLOGY | Facility: HOSPITAL | Age: 64
DRG: 322 | End: 2024-06-02
Payer: MEDICARE

## 2024-06-02 DIAGNOSIS — I21.4 NSTEMI (NON-ST ELEVATED MYOCARDIAL INFARCTION) (HCC): ICD-10-CM

## 2024-06-02 DIAGNOSIS — R07.9 CHEST PAIN: Primary | ICD-10-CM

## 2024-06-02 DIAGNOSIS — F17.200 TOBACCO DEPENDENCE: ICD-10-CM

## 2024-06-02 DIAGNOSIS — I48.0 PAROXYSMAL ATRIAL FIBRILLATION (HCC): ICD-10-CM

## 2024-06-02 DIAGNOSIS — I48.91 RAPID ATRIAL FIBRILLATION (HCC): ICD-10-CM

## 2024-06-02 PROBLEM — E87.1 HYPONATREMIA: Status: ACTIVE | Noted: 2024-06-02

## 2024-06-02 LAB
2HR DELTA HS TROPONIN: 2223 NG/L
4HR DELTA HS TROPONIN: 9864 NG/L
ALBUMIN SERPL BCP-MCNC: 4.7 G/DL (ref 3.5–5)
ALP SERPL-CCNC: 67 U/L (ref 34–104)
ALT SERPL W P-5'-P-CCNC: 20 U/L (ref 7–52)
ANION GAP SERPL CALCULATED.3IONS-SCNC: 12 MMOL/L (ref 4–13)
APTT PPP: 24 SECONDS (ref 23–37)
AST SERPL W P-5'-P-CCNC: 31 U/L (ref 13–39)
ATRIAL RATE: 241 BPM
ATRIAL RATE: 326 BPM
ATRIAL RATE: 394 BPM
BASOPHILS # BLD AUTO: 0.05 THOUSANDS/ÂΜL (ref 0–0.1)
BASOPHILS NFR BLD AUTO: 0 % (ref 0–1)
BILIRUB SERPL-MCNC: 0.6 MG/DL (ref 0.2–1)
BNP SERPL-MCNC: 154 PG/ML (ref 0–100)
BUN SERPL-MCNC: 13 MG/DL (ref 5–25)
CALCIUM SERPL-MCNC: 9.6 MG/DL (ref 8.4–10.2)
CARDIAC TROPONIN I PNL SERPL HS: 1215 NG/L (ref 8–18)
CARDIAC TROPONIN I PNL SERPL HS: 3132 NG/L
CARDIAC TROPONIN I PNL SERPL HS: 909 NG/L
CARDIAC TROPONIN I PNL SERPL HS: ABNORMAL NG/L
CHLORIDE SERPL-SCNC: 93 MMOL/L (ref 96–108)
CO2 SERPL-SCNC: 24 MMOL/L (ref 21–32)
CREAT SERPL-MCNC: 1.15 MG/DL (ref 0.6–1.3)
D DIMER PPP FEU-MCNC: 0.52 UG/ML FEU
EOSINOPHIL # BLD AUTO: 0.08 THOUSAND/ÂΜL (ref 0–0.61)
EOSINOPHIL NFR BLD AUTO: 1 % (ref 0–6)
ERYTHROCYTE [DISTWIDTH] IN BLOOD BY AUTOMATED COUNT: 12.7 % (ref 11.6–15.1)
GFR SERPL CREATININE-BSD FRML MDRD: 67 ML/MIN/1.73SQ M
GLUCOSE SERPL-MCNC: 268 MG/DL (ref 65–140)
GLUCOSE SERPL-MCNC: 356 MG/DL (ref 65–140)
HCT VFR BLD AUTO: 47.3 % (ref 36.5–49.3)
HGB BLD-MCNC: 16.3 G/DL (ref 12–17)
IMM GRANULOCYTES # BLD AUTO: 0.05 THOUSAND/UL (ref 0–0.2)
IMM GRANULOCYTES NFR BLD AUTO: 0 % (ref 0–2)
INR PPP: 0.91 (ref 0.84–1.19)
LYMPHOCYTES # BLD AUTO: 2.42 THOUSANDS/ÂΜL (ref 0.6–4.47)
LYMPHOCYTES NFR BLD AUTO: 17 % (ref 14–44)
MCH RBC QN AUTO: 31.2 PG (ref 26.8–34.3)
MCHC RBC AUTO-ENTMCNC: 34.5 G/DL (ref 31.4–37.4)
MCV RBC AUTO: 91 FL (ref 82–98)
MONOCYTES # BLD AUTO: 1.02 THOUSAND/ÂΜL (ref 0.17–1.22)
MONOCYTES NFR BLD AUTO: 7 % (ref 4–12)
NEUTROPHILS # BLD AUTO: 10.8 THOUSANDS/ÂΜL (ref 1.85–7.62)
NEUTS SEG NFR BLD AUTO: 75 % (ref 43–75)
NRBC BLD AUTO-RTO: 0 /100 WBCS
PLATELET # BLD AUTO: 259 THOUSANDS/UL (ref 149–390)
PMV BLD AUTO: 10.7 FL (ref 8.9–12.7)
POTASSIUM SERPL-SCNC: 4.2 MMOL/L (ref 3.5–5.3)
PROT SERPL-MCNC: 8.2 G/DL (ref 6.4–8.4)
PROTHROMBIN TIME: 12.5 SECONDS (ref 11.6–14.5)
QRS AXIS: 74 DEGREES
QRS AXIS: 99 DEGREES
QRS AXIS: 99 DEGREES
QRSD INTERVAL: 86 MS
QRSD INTERVAL: 92 MS
QRSD INTERVAL: 92 MS
QT INTERVAL: 364 MS
QT INTERVAL: 396 MS
QT INTERVAL: 460 MS
QTC INTERVAL: 497 MS
QTC INTERVAL: 501 MS
QTC INTERVAL: 540 MS
RBC # BLD AUTO: 5.22 MILLION/UL (ref 3.88–5.62)
SODIUM SERPL-SCNC: 129 MMOL/L (ref 135–147)
T WAVE AXIS: -66 DEGREES
T WAVE AXIS: -67 DEGREES
T WAVE AXIS: 167 DEGREES
VENTRICULAR RATE: 114 BPM
VENTRICULAR RATE: 83 BPM
VENTRICULAR RATE: 95 BPM
WBC # BLD AUTO: 14.42 THOUSAND/UL (ref 4.31–10.16)

## 2024-06-02 PROCEDURE — 99223 1ST HOSP IP/OBS HIGH 75: CPT | Performed by: INTERNAL MEDICINE

## 2024-06-02 PROCEDURE — 80053 COMPREHEN METABOLIC PANEL: CPT

## 2024-06-02 PROCEDURE — 93010 ELECTROCARDIOGRAM REPORT: CPT | Performed by: INTERNAL MEDICINE

## 2024-06-02 PROCEDURE — 93005 ELECTROCARDIOGRAM TRACING: CPT

## 2024-06-02 PROCEDURE — 96375 TX/PRO/DX INJ NEW DRUG ADDON: CPT

## 2024-06-02 PROCEDURE — 84484 ASSAY OF TROPONIN QUANT: CPT

## 2024-06-02 PROCEDURE — 71045 X-RAY EXAM CHEST 1 VIEW: CPT

## 2024-06-02 PROCEDURE — 99285 EMERGENCY DEPT VISIT HI MDM: CPT

## 2024-06-02 PROCEDURE — 99285 EMERGENCY DEPT VISIT HI MDM: CPT | Performed by: EMERGENCY MEDICINE

## 2024-06-02 PROCEDURE — 96374 THER/PROPH/DIAG INJ IV PUSH: CPT

## 2024-06-02 PROCEDURE — 83880 ASSAY OF NATRIURETIC PEPTIDE: CPT

## 2024-06-02 PROCEDURE — 84484 ASSAY OF TROPONIN QUANT: CPT | Performed by: INTERNAL MEDICINE

## 2024-06-02 PROCEDURE — 82948 REAGENT STRIP/BLOOD GLUCOSE: CPT

## 2024-06-02 PROCEDURE — 85025 COMPLETE CBC W/AUTO DIFF WBC: CPT

## 2024-06-02 PROCEDURE — 36415 COLL VENOUS BLD VENIPUNCTURE: CPT

## 2024-06-02 PROCEDURE — 85730 THROMBOPLASTIN TIME PARTIAL: CPT

## 2024-06-02 PROCEDURE — 96361 HYDRATE IV INFUSION ADD-ON: CPT

## 2024-06-02 PROCEDURE — 85610 PROTHROMBIN TIME: CPT

## 2024-06-02 PROCEDURE — 85379 FIBRIN DEGRADATION QUANT: CPT

## 2024-06-02 RX ORDER — AMLODIPINE BESYLATE 10 MG/1
10 TABLET ORAL DAILY
Status: DISCONTINUED | OUTPATIENT
Start: 2024-06-03 | End: 2024-06-04 | Stop reason: HOSPADM

## 2024-06-02 RX ORDER — FENTANYL CITRATE 50 UG/ML
50 INJECTION, SOLUTION INTRAMUSCULAR; INTRAVENOUS ONCE
Status: COMPLETED | OUTPATIENT
Start: 2024-06-02 | End: 2024-06-02

## 2024-06-02 RX ORDER — DILTIAZEM HYDROCHLORIDE 5 MG/ML
15 INJECTION INTRAVENOUS ONCE
Status: COMPLETED | OUTPATIENT
Start: 2024-06-02 | End: 2024-06-02

## 2024-06-02 RX ORDER — ACETAMINOPHEN 325 MG/1
650 TABLET ORAL EVERY 4 HOURS PRN
Status: DISCONTINUED | OUTPATIENT
Start: 2024-06-02 | End: 2024-06-04 | Stop reason: HOSPADM

## 2024-06-02 RX ORDER — HYDROMORPHONE HCL/PF 1 MG/ML
1 SYRINGE (ML) INJECTION EVERY 6 HOURS PRN
Status: DISCONTINUED | OUTPATIENT
Start: 2024-06-02 | End: 2024-06-02

## 2024-06-02 RX ORDER — HEPARIN SODIUM 1000 [USP'U]/ML
4000 INJECTION, SOLUTION INTRAVENOUS; SUBCUTANEOUS EVERY 6 HOURS PRN
Status: DISCONTINUED | OUTPATIENT
Start: 2024-06-02 | End: 2024-06-03

## 2024-06-02 RX ORDER — ALPRAZOLAM 0.25 MG/1
0.25 TABLET ORAL 2 TIMES DAILY PRN
Status: DISCONTINUED | OUTPATIENT
Start: 2024-06-02 | End: 2024-06-04 | Stop reason: HOSPADM

## 2024-06-02 RX ORDER — ATORVASTATIN CALCIUM 40 MG/1
40 TABLET, FILM COATED ORAL
Status: DISCONTINUED | OUTPATIENT
Start: 2024-06-03 | End: 2024-06-02

## 2024-06-02 RX ORDER — METOPROLOL SUCCINATE 25 MG/1
25 TABLET, EXTENDED RELEASE ORAL DAILY
Status: DISCONTINUED | OUTPATIENT
Start: 2024-06-03 | End: 2024-06-02

## 2024-06-02 RX ORDER — ALBUTEROL SULFATE 90 UG/1
2 AEROSOL, METERED RESPIRATORY (INHALATION) EVERY 6 HOURS PRN
Status: DISCONTINUED | OUTPATIENT
Start: 2024-06-02 | End: 2024-06-04 | Stop reason: HOSPADM

## 2024-06-02 RX ORDER — METOPROLOL TARTRATE 1 MG/ML
5 INJECTION, SOLUTION INTRAVENOUS ONCE
Status: COMPLETED | OUTPATIENT
Start: 2024-06-02 | End: 2024-06-02

## 2024-06-02 RX ORDER — INSULIN GLARGINE 100 [IU]/ML
28 INJECTION, SOLUTION SUBCUTANEOUS
Status: DISCONTINUED | OUTPATIENT
Start: 2024-06-02 | End: 2024-06-04 | Stop reason: HOSPADM

## 2024-06-02 RX ORDER — SODIUM CHLORIDE 9 MG/ML
150 INJECTION, SOLUTION INTRAVENOUS CONTINUOUS
Status: DISCONTINUED | OUTPATIENT
Start: 2024-06-02 | End: 2024-06-02

## 2024-06-02 RX ORDER — INSULIN LISPRO 100 [IU]/ML
1-6 INJECTION, SOLUTION INTRAVENOUS; SUBCUTANEOUS
Status: DISCONTINUED | OUTPATIENT
Start: 2024-06-02 | End: 2024-06-04 | Stop reason: HOSPADM

## 2024-06-02 RX ORDER — ASPIRIN 81 MG/1
324 TABLET, CHEWABLE ORAL ONCE
Status: COMPLETED | OUTPATIENT
Start: 2024-06-02 | End: 2024-06-02

## 2024-06-02 RX ORDER — HEPARIN SODIUM 1000 [USP'U]/ML
2000 INJECTION, SOLUTION INTRAVENOUS; SUBCUTANEOUS EVERY 6 HOURS PRN
Status: DISCONTINUED | OUTPATIENT
Start: 2024-06-02 | End: 2024-06-03

## 2024-06-02 RX ORDER — NICOTINE 21 MG/24HR
1 PATCH, TRANSDERMAL 24 HOURS TRANSDERMAL DAILY
Status: DISCONTINUED | OUTPATIENT
Start: 2024-06-03 | End: 2024-06-04 | Stop reason: HOSPADM

## 2024-06-02 RX ORDER — HEPARIN SODIUM 1000 [USP'U]/ML
4000 INJECTION, SOLUTION INTRAVENOUS; SUBCUTANEOUS ONCE
Status: COMPLETED | OUTPATIENT
Start: 2024-06-02 | End: 2024-06-02

## 2024-06-02 RX ORDER — PAROXETINE HYDROCHLORIDE 20 MG/1
20 TABLET, FILM COATED ORAL DAILY
Status: DISCONTINUED | OUTPATIENT
Start: 2024-06-03 | End: 2024-06-04 | Stop reason: HOSPADM

## 2024-06-02 RX ORDER — ONDANSETRON 2 MG/ML
4 INJECTION INTRAMUSCULAR; INTRAVENOUS ONCE
Status: COMPLETED | OUTPATIENT
Start: 2024-06-02 | End: 2024-06-02

## 2024-06-02 RX ORDER — LOSARTAN POTASSIUM 50 MG/1
100 TABLET ORAL DAILY
Status: DISCONTINUED | OUTPATIENT
Start: 2024-06-03 | End: 2024-06-04 | Stop reason: HOSPADM

## 2024-06-02 RX ORDER — OXYCODONE HYDROCHLORIDE 10 MG/1
30 TABLET ORAL EVERY 12 HOURS PRN
Status: DISCONTINUED | OUTPATIENT
Start: 2024-06-02 | End: 2024-06-04 | Stop reason: HOSPADM

## 2024-06-02 RX ORDER — NITROGLYCERIN 0.4 MG/1
0.4 TABLET SUBLINGUAL ONCE
Status: COMPLETED | OUTPATIENT
Start: 2024-06-02 | End: 2024-06-02

## 2024-06-02 RX ORDER — ONDANSETRON 2 MG/ML
4 INJECTION INTRAMUSCULAR; INTRAVENOUS EVERY 4 HOURS PRN
Status: DISCONTINUED | OUTPATIENT
Start: 2024-06-02 | End: 2024-06-04 | Stop reason: HOSPADM

## 2024-06-02 RX ORDER — OXYCODONE HYDROCHLORIDE 10 MG/1
10 TABLET ORAL EVERY 6 HOURS PRN
Status: DISCONTINUED | OUTPATIENT
Start: 2024-06-02 | End: 2024-06-04 | Stop reason: HOSPADM

## 2024-06-02 RX ORDER — SODIUM CHLORIDE 9 MG/ML
3 INJECTION INTRAVENOUS
Status: DISCONTINUED | OUTPATIENT
Start: 2024-06-02 | End: 2024-06-04 | Stop reason: HOSPADM

## 2024-06-02 RX ORDER — ATORVASTATIN CALCIUM 80 MG/1
80 TABLET, FILM COATED ORAL
Status: DISCONTINUED | OUTPATIENT
Start: 2024-06-03 | End: 2024-06-02

## 2024-06-02 RX ORDER — HEPARIN SODIUM 10000 [USP'U]/100ML
3-20 INJECTION, SOLUTION INTRAVENOUS
Status: DISCONTINUED | OUTPATIENT
Start: 2024-06-02 | End: 2024-06-03

## 2024-06-02 RX ORDER — ZOLPIDEM TARTRATE 5 MG/1
10 TABLET ORAL
Status: DISCONTINUED | OUTPATIENT
Start: 2024-06-02 | End: 2024-06-04 | Stop reason: HOSPADM

## 2024-06-02 RX ORDER — HYDROMORPHONE HCL/PF 1 MG/ML
1 SYRINGE (ML) INJECTION EVERY 4 HOURS PRN
Status: DISCONTINUED | OUTPATIENT
Start: 2024-06-02 | End: 2024-06-03

## 2024-06-02 RX ORDER — ATORVASTATIN CALCIUM 80 MG/1
80 TABLET, FILM COATED ORAL
Status: DISCONTINUED | OUTPATIENT
Start: 2024-06-02 | End: 2024-06-04 | Stop reason: HOSPADM

## 2024-06-02 RX ORDER — PANTOPRAZOLE SODIUM 40 MG/1
40 TABLET, DELAYED RELEASE ORAL
Status: DISCONTINUED | OUTPATIENT
Start: 2024-06-03 | End: 2024-06-04 | Stop reason: HOSPADM

## 2024-06-02 RX ORDER — AMIODARONE HYDROCHLORIDE 200 MG/1
200 TABLET ORAL DAILY
Status: DISCONTINUED | OUTPATIENT
Start: 2024-06-03 | End: 2024-06-02

## 2024-06-02 RX ORDER — MORPHINE SULFATE 4 MG/ML
4 INJECTION, SOLUTION INTRAMUSCULAR; INTRAVENOUS ONCE
Status: COMPLETED | OUTPATIENT
Start: 2024-06-02 | End: 2024-06-02

## 2024-06-02 RX ADMIN — MORPHINE SULFATE 4 MG: 4 INJECTION INTRAVENOUS at 18:25

## 2024-06-02 RX ADMIN — OXYCODONE HYDROCHLORIDE 10 MG: 10 TABLET ORAL at 22:14

## 2024-06-02 RX ADMIN — ASPIRIN 81 MG CHEWABLE TABLET 324 MG: 81 TABLET CHEWABLE at 17:45

## 2024-06-02 RX ADMIN — ONDANSETRON 4 MG: 2 INJECTION INTRAMUSCULAR; INTRAVENOUS at 23:33

## 2024-06-02 RX ADMIN — HEPARIN SODIUM 11.8 UNITS/KG/HR: 10000 INJECTION, SOLUTION INTRAVENOUS at 18:54

## 2024-06-02 RX ADMIN — DILTIAZEM HYDROCHLORIDE 15 MG: 5 INJECTION, SOLUTION INTRAVENOUS at 18:20

## 2024-06-02 RX ADMIN — METOROPROLOL TARTRATE 5 MG: 5 INJECTION, SOLUTION INTRAVENOUS at 19:09

## 2024-06-02 RX ADMIN — ACETAMINOPHEN 650 MG: 325 TABLET, FILM COATED ORAL at 21:38

## 2024-06-02 RX ADMIN — INSULIN GLARGINE 28 UNITS: 100 INJECTION, SOLUTION SUBCUTANEOUS at 21:10

## 2024-06-02 RX ADMIN — INSULIN LISPRO 3 UNITS: 100 INJECTION, SOLUTION INTRAVENOUS; SUBCUTANEOUS at 21:10

## 2024-06-02 RX ADMIN — ONDANSETRON 4 MG: 2 INJECTION INTRAMUSCULAR; INTRAVENOUS at 19:05

## 2024-06-02 RX ADMIN — METOPROLOL TARTRATE 25 MG: 25 TABLET, FILM COATED ORAL at 18:38

## 2024-06-02 RX ADMIN — SODIUM CHLORIDE 1000 ML: 0.9 INJECTION, SOLUTION INTRAVENOUS at 18:18

## 2024-06-02 RX ADMIN — NITROGLYCERIN 0.4 MG: 0.4 TABLET SUBLINGUAL at 18:43

## 2024-06-02 RX ADMIN — NITROGLYCERIN 1 INCH: 20 OINTMENT TOPICAL at 20:20

## 2024-06-02 RX ADMIN — ATORVASTATIN CALCIUM 80 MG: 80 TABLET, FILM COATED ORAL at 21:38

## 2024-06-02 RX ADMIN — HEPARIN SODIUM 4000 UNITS: 1000 INJECTION INTRAVENOUS; SUBCUTANEOUS at 18:53

## 2024-06-02 RX ADMIN — HYDROMORPHONE HYDROCHLORIDE 1 MG: 1 INJECTION, SOLUTION INTRAMUSCULAR; INTRAVENOUS; SUBCUTANEOUS at 21:09

## 2024-06-02 RX ADMIN — FENTANYL CITRATE 50 MCG: 50 INJECTION, SOLUTION INTRAMUSCULAR; INTRAVENOUS at 17:54

## 2024-06-02 NOTE — ED ATTENDING ATTESTATION
6/2/2024  ICamelia DO, saw and evaluated the patient. I have discussed the patient with the resident/non-physician practitioner and agree with the resident's/non-physician practitioner's findings, Plan of Care, and MDM as documented in the resident's/non-physician practitioner's note, except where noted. All available labs and Radiology studies were reviewed.  I was present for key portions of any procedure(s) performed by the resident/non-physician practitioner and I was immediately available to provide assistance.    Patient is a 63-year-old male with a history of A-fib on Eliquis, diabetes, GERD, hypertension anxiety coming in for chest pain that started last night progressively worsening to today.  Patient on my exam is complaining of persistent chest pain despite fentanyl.  Patient's wife and daughter at bedside.  Daughter helps translate and states that he admits to not taking his medicine but states he has not taken it for a week.  This was different from a prior history.  Patient complains of pain in his chest described as squeezing sharp pain that radiates to the neck and left upper extremity.      On my examination of patient:   /99 (BP Location: Right arm)   Pulse (!) 127   Temp 98.6 °F (37 °C) (Oral)   Resp 20   Wt 86.3 kg (190 lb 4.1 oz)   SpO2 98%   BMI 32.66 kg/m²   The patient is awake, alert and oriented.  Patient appears uncomfortable  Well-nourished, well-developed. Appears stated age.   Speaking normally in full sentences.  No conversational dyspnea  Head: Normocephalic, atraumatic, nontender.    External examination of the ears is unremarkable  Pupils are equal round and reactive to light, there is no conjunctival injection or scleral icterus noted  Nares are patent without rhinorrhea.  The oropharynx is moist without injection.  No malocclusion. No stridor. Normal phonation. No drooling. Normal swallowing.  No brawniness under the tongue  Neck: Symmetric, trachea  "midline. No JVD.  Supple  Lungs: Unlabored,  No retractions, CTAB, lungs sounds equal bilateral. No tachypnea.   Heart: Irregular without any murmurs.  On my exam it is a regular rate  Abdomen: Soft and nontender. There is no rebound or guarding  Musculoskeletal: Normal range of motion with grossly normal strength  Neuro: Cranial nerves II through XII grossly intact. Nonfocal exam  Skin: No rash noted, well perfused to the exposed areas  Psych: Mood and affect are appropriate      7:09 PM    On my exam patient does have A-fib and on initial EKG was tachycardic however now has A-fib in a rate controlled setting.  He still complains of diffuse chest pain with radiation.  Morphine was ordered and he had no relief.  Will give nitro.  Dr. Garcia has been speaking with cardiology and will continue to plan for admission.    It is also noted patient did have mild leukocytosis however did have elevated glucose without anion gap.  His troponin is elevated with elevated proBNP.  D-dimer 0.52 but patient age-adjusted this rules out for CT angio.    Conversations with cardiology is recommending ongoing aggressive rate control, serial EKGs as well as troponins.  Low threshold to catheterization.  Heparin drip ordered as well as metoprolol for rate control.    Patient did have a CT angio of abdomen pelvis in March with unremarkable mesenteric arterial vasculature.  Echo in March 2024 showed EF of 55%.  Stress test in February 2024 showed there is no perfusion deficit    Patient will be admitted to medicine with continued pain control as well as rate control.    Disclosure: Voice to text software was used in the preparation of this document and could have resulted in translational errors.      Occasional wrong word or \"sound a like\" substitutions may have occurred due to the inherent limitations of voice recognition software.  Read the chart carefully and recognize, using context, where substitutions have occurred.       I have " "independently reviewed external records are available to me to the level of detail possible within the time constraints of my patient care responsibilities in the ED.          Disclosure: Voice to text software was used in the preparation of this document and could have resulted in translational errors.      Occasional wrong word or \"sound a like\" substitutions may have occurred due to the inherent limitations of voice recognition software.  Read the chart carefully and recognize, using context, where substitutions have occurred.       I have independently reviewed external records are available to me to the level of detail possible within the time constraints of my patient care responsibilities in the ED.      "

## 2024-06-02 NOTE — LETTER
[facility]  [address] [address]  Dept: [phone]    June 4, 2024     Patient: Gulshan Arias   YOB: 1960   Date of Visit: 6/2/2024       To Whom it May Concern:    Gulshan Arias is under my professional care. He was seen in the hospital from 6/2/2024 to 06/04/24. His daughter, Yamil Mejia, was here visiting during that time.  Please excuse her absence from work.    If you have any questions or concerns, please don't hesitate to call.         Sincerely,          JACOB Leahy

## 2024-06-02 NOTE — H&P
"[facility]  H&P  Name: Gulshan Arias 63 y.o. male I MRN: [id number]  Unit/Bed#: ED-26 I Date of Admission: 6/2/2024   Date of Service: 6/2/2024 I Hospital Day: 0      Assessment & Plan   * Rapid atrial fibrillation (HCC)  Assessment & Plan  History of atrial fibrillation insulin-dependent diabetes hypertension anxiety and chronic pain presented to the hospital with chest pain found to have elevated cardiac enzymes and atrial fibrillation/RVR  Plan of care discussed by ED with cardiology: Recommending metoprolol 25 mg every 6 hours with heparin infusion.  Continue patient amiodarone.  Holding xarelto.  Caffeine consumption: Drinks 5 to 6 cups of coffee a day.  Advised to cut down as this may be contributing to his tachycardia.    NSTEMI (non-ST elevated myocardial infarction) ([facility])  Assessment & Plan  Chest pain with elevated cardiac enzymes secondary to atrial fibrillation/tachycardia.    T wave inversions lateral leads  Cardiology recommended heparin infusion.  Will keep n.p.o. after midnight in case catheterization is warranted    Lab Results   Component Value Date    HSTNI0 909 (H) 06/02/2024    HSTNI 1,215 (H) 06/02/2024       Hyponatremia  Assessment & Plan  Partially due to hyperglycemia  Will hold HCTZ component of losartan/HCT  Restart HCTZ when appropriate    Results from last 7 days   Lab Units 06/02/24  1747   SODIUM mmol/L 129*       Generalized anxiety disorder  Assessment & Plan  Continue paroxetine with alprazolam as needed    Chronic midline low back pain without sciatica  Assessment & Plan  Confirmed on .  On oxycodone 30 mg as needed    Primary insomnia  Assessment & Plan  Prior to admission on zolpidem.  Confirmed on .    Type 2 diabetes mellitus without complication, with long-term current use of insulin (HCC)  Assessment & Plan  Lab Results   Component Value Date    HGBA1C 9.5 (A) 01/04/2024       No results for input(s): \"POCGLU\" in the last 72 " hours.    Hyperglycemia upon arrival.  Continue glargine 28 units.  Add sliding scale.    Essential hypertension  Assessment & Plan  Elevated on arrival.  Continue amlodipine.  Holding HCTZ portion of losartan/HCT due to hyponatremia.  Metoprolol increased to 25 mg every 6 hours per ED discussed with cardiology for better rate control of atrial fibrillation    VTE Pharmacologic Prophylaxis: VTE Score: 3 Moderate Risk (Score 3-4) - Pharmacological DVT Prophylaxis Ordered: heparin drip.  Code Status: Level 1 - Full Code  Discussion with family:     Anticipated Length of Stay: Patient will be admitted on an inpatient basis with an anticipated length of stay of greater than 2 midnights secondary to rapid atrial fibrillation NSTEMI.    Total Time Spent on Date of Encounter in care of patient: This time was spent on one or more of the following: performing physical exam; counseling and coordination of care; obtaining or reviewing history; documenting in the medical record; reviewing/ordering tests, medications or procedures; communicating with other healthcare professionals and discussing with patient's family/caregivers.    Chief Complaint:     Chest Pain (Woke up with left sided cp, describes as sharp, non radiating)    History of Present Illness:    Gulshan Arias is a 63 y.o. male with a past medical history of atrial fibrillation hypertension insulin-dependent diabetes anxiety and chronic pain who presents with chest pain.  He woke up this morning with left-sided chest pain sharp and heavy.  It radiates up the neck into the left arm.  He came to the emergency department where he was found to have rapid atrial fibrillation and elevated cardiac enzymes.  Plan of care discussed by ED with cardiology with recommendation for metoprolol every 6 hours with heparin infusion.  On exam he still has pain in his left chest and left forearm.  He denies any nausea vomiting or diarrhea.  Unfortunately he still smokes  and drinks 5 to 6 cups of coffee a day.    Review of Systems:  Review of Systems   Constitutional:  Negative for fever.   HENT:  Negative for facial swelling.    Eyes:  Negative for visual disturbance.   Respiratory:  Negative for shortness of breath.    Cardiovascular:  Positive for chest pain and palpitations.   Gastrointestinal:  Negative for abdominal distention, abdominal pain, diarrhea, nausea and vomiting.   Genitourinary:  Negative for hematuria.   Musculoskeletal:  Negative for back pain and myalgias.   Skin:  Negative for rash.   Neurological:  Negative for seizures and speech difficulty.   Psychiatric/Behavioral:  The patient is not nervous/anxious.    All other systems reviewed and are negative.        Past Medical and Surgical History:   Past Medical History:   Diagnosis Date    Depression     Diabetes mellitus (HCC)     Hyperlipidemia     Hypertension     Tobacco abuse      Past Surgical History:   Procedure Laterality Date    APPENDECTOMY      SHOULDER ARTHROSCOPY       Meds/Allergies:  Allergies: No Known Allergies  Prior to Admission Medications   Prescriptions Last Dose Informant Patient Reported? Taking?   ALPRAZolam (XANAX) 0.25 mg tablet Past Week Spouse/Significant Other No Yes   Sig: TAKE 1 TABLET BY MOUTH TWICE DAILY AS NEEDED FOR ANXIETY   Insulin Glargine Solostar (Lantus SoloStar) 100 UNIT/ML SOPN  Spouse/Significant Other No No   Sig: Inject 0.28 mL (28 Units total) under the skin daily at bedtime   Insulin Pen Needle (TechLite Pen Needles) 31G X 8 MM MISC  Spouse/Significant Other No No   Sig: Apply topically 4 (four) times a day   Lancets Misc. (Accu-Chek FastClix Lancet) KIT  Spouse/Significant Other No No   Sig: Inject under the skin 2 (two) times a day   PARoxetine (PAXIL) 20 mg tablet  Spouse/Significant Other No No   Sig: Take 1 tablet (20 mg total) by mouth daily   albuterol (PROVENTIL HFA,VENTOLIN HFA) 90 mcg/act inhaler  Spouse/Significant Other No No   Sig: Inhale 2 puffs every  6 (six) hours as needed for wheezing or shortness of breath   amLODIPine (NORVASC) 10 mg tablet  Spouse/Significant Other No No   Sig: Take 1 tablet (10 mg total) by mouth daily   amiodarone 200 mg tablet   No No   Sig: Take 1 tablet (200 mg total) by mouth daily Starting 3/23/24   dulaglutide (Trulicity) 0.75 MG/0.5ML injection  Spouse/Significant Other No No   Sig: Inject 0.5 mL (0.75 mg total) under the skin every 7 days   fluticasone (Flovent HFA) 44 mcg/act inhaler  Spouse/Significant Other No No   Sig: Inhale 2 puffs 2 (two) times a day Rinse mouth after use.   Patient not taking: Reported on 2/15/2024   glucose blood test strip  Spouse/Significant Other No No   Si each by Other route 2 (two) times a day Use as instructed   losartan-hydrochlorothiazide (HYZAAR) 100-25 MG per tablet  Spouse/Significant Other No No   Sig: Take 1 tablet by mouth daily   metFORMIN (GLUCOPHAGE) 1000 MG tablet  Spouse/Significant Other No Yes   Sig: Take 1 tablet (1,000 mg total) by mouth 2 (two) times a day with meals   metoprolol succinate (TOPROL-XL) 25 mg 24 hr tablet   No Yes   Sig: Take 1 tablet (25 mg total) by mouth daily   oxyCODONE (ROXICODONE) 30 MG immediate release tablet  Spouse/Significant Other Yes No   pantoprazole (PROTONIX) 40 mg tablet  Spouse/Significant Other No No   Sig: Take 1 tablet (40 mg total) by mouth daily   polyethylene glycol (MIRALAX) 17 g packet   No No   Sig: Take 17 g by mouth daily for 5 days   rivaroxaban (Xarelto) 15 mg tablet   No Yes   Sig: Take 1 tablet (15 mg total) by mouth daily with breakfast   simvastatin (ZOCOR) 40 mg tablet  Spouse/Significant Other No No   Sig: Take 1 tablet (40 mg total) by mouth daily at bedtime   zolpidem (AMBIEN) 10 mg tablet  Spouse/Significant Other Yes No   Sig: Take 10 mg by mouth daily at bedtime as needed for sleep      Facility-Administered Medications: None     Social History:     Social History     Socioeconomic History    Marital status:  /Civil Union     Spouse name: Not on file    Number of children: Not on file    Years of education: Not on file    Highest education level: Not on file   Occupational History    Not on file   Tobacco Use    Smoking status: Every Day     Current packs/day: 1.00     Types: Cigarettes     Passive exposure: Current    Smokeless tobacco: Current   Vaping Use    Vaping status: Never Used   Substance and Sexual Activity    Alcohol use: No    Drug use: No    Sexual activity: Not on file   Other Topics Concern    Not on file   Social History Narrative    Not on file     Social Determinants of Health     Financial Resource Strain: Low Risk  (1/4/2024)    Overall Financial Resource Strain (CARDIA)     Difficulty of Paying Living Expenses: Not hard at all   Food Insecurity: No Food Insecurity (1/4/2024)    Hunger Vital Sign     Worried About Running Out of Food in the Last Year: Never true     Ran Out of Food in the Last Year: Never true   Transportation Needs: No Transportation Needs (1/4/2024)    PRAPARE - Transportation     Lack of Transportation (Medical): No     Lack of Transportation (Non-Medical): No   Physical Activity: Not on file   Stress: Not on file   Social Connections: Not on file   Intimate Partner Violence: Not on file   Housing Stability: Not on file     Patient Pre-hospital Living Situation:   Patient Pre-hospital Level of Mobility:   Patient Pre-hospital Diet Restrictions:     Family History:  History reviewed. No pertinent family history.  Physical Exam:   Vitals:   Blood Pressure: 141/74 (06/02/24 1930)  Pulse: 72 (06/02/24 1930)  Temperature: 98.6 °F (37 °C) (06/02/24 1742)  Temp Source: Oral (06/02/24 1742)  Respirations: 15 (06/02/24 1930)  Weight - Scale: 86.3 kg (190 lb 4.1 oz) (06/02/24 1735)  SpO2: 93 % (06/02/24 1930)    Physical Exam  Vitals reviewed.   Constitutional:       General: He is not in acute distress.  HENT:      Head: Atraumatic.   Eyes:      General: No scleral icterus.      Conjunctiva/sclera: Conjunctivae normal.   Cardiovascular:      Rate and Rhythm: Normal rate. Rhythm irregular.   Pulmonary:      Effort: Pulmonary effort is normal.      Breath sounds: No wheezing.   Abdominal:      General: Bowel sounds are normal. There is no distension.      Palpations: Abdomen is soft.      Tenderness: There is no abdominal tenderness.   Musculoskeletal:         General: No tenderness or deformity.      Cervical back: Neck supple.   Skin:     General: Skin is warm and dry.   Neurological:      General: No focal deficit present.      Mental Status: He is alert.      Motor: No weakness.   Psychiatric:         Mood and Affect: Mood normal.       Lab Results: I have personally reviewed pertinent reports.    Results from last 7 days   Lab Units 06/02/24  1747   WBC Thousand/uL 14.42*   HEMOGLOBIN g/dL 16.3   HEMATOCRIT % 47.3   PLATELETS Thousands/uL 259   SEGS PCT % 75   LYMPHO PCT % 17   MONO PCT % 7   EOS PCT % 1     Results from last 7 days   Lab Units 06/02/24  1747   SODIUM mmol/L 129*   POTASSIUM mmol/L 4.2   CHLORIDE mmol/L 93*   CO2 mmol/L 24   ANION GAP mmol/L 12   BUN mg/dL 13   CREATININE mg/dL 1.15   CALCIUM mg/dL 9.6   ALBUMIN g/dL 4.7   TOTAL BILIRUBIN mg/dL 0.60   ALK PHOS U/L 67   ALT U/L 20   AST U/L 31   EGFR ml/min/1.73sq m 67   GLUCOSE RANDOM mg/dL 356*     Results from last 7 days   Lab Units 06/02/24  1747   INR  0.91         Results from last 7 days   Lab Units 06/02/24  1747   HS TNI 0HR ng/L 909*         Results from last 7 days   Lab Units 06/02/24  1747   D-DIMER QUANTITATIVE ug/ml FEU 0.52*                          Lines/Drains  Invasive Devices       Peripheral Intravenous Line  Duration             Peripheral IV 06/02/24 Dorsal (posterior);Right Forearm <1 day    Peripheral IV 06/02/24 Dorsal (posterior);Right Hand <1 day                    Imaging: I have personally reviewed pertinent films in PACS  XR chest portable.  Personal impression: No infiltrates    EKG,  Pathology, and Other Studies Reviewed on Admission:   EKG  Result Date: 06/02/24  Personally reviewed strips with impression of: Atrial fibrillation 114 bpm T wave inversions lateral leads    ** Please Note: This note has been constructed using a voice recognition system. **

## 2024-06-02 NOTE — Clinical Note
The left coronary artery was injected and visualized. Multiple views of the injected vessel were taken. left/done

## 2024-06-02 NOTE — LETTER
[place] [address]  [address] [address]  Dept: [phone]    June 4, 2024     Patient: Gulshan Arias   YOB: 1960   Date of Visit: 6/2/2024       To Whom it May Concern:    Gulshan Arias is under my professional care. He was seen in the hospital from 6/2/2024 to 06/04/24. His daughter, Shaheen Avila, was here visiting during that time.  Please excuse her absence from work.    If you have any questions or concerns, please don't hesitate to call.         Sincerely,          JACOB Leahy

## 2024-06-02 NOTE — ED NOTES
2nd sublingual nitro given at this time per nitro order       Keisha Vaz, FRANCESCA  06/02/24 [id number]

## 2024-06-02 NOTE — LETTER
[facility]  [address] [address]  Dept: [phone]    June 4, 2024     Patient: Gulshan Arias   YOB: 1960   Date of Visit: 6/2/2024       To Whom it May Concern:    Gulshan Arias is under my professional care. He was seen in the hospital from 6/2/2024 to 06/04/24. His daughter, Tessa Mejia, was here visiting during that time.  Please excuse her absence from work.    If you have any questions or concerns, please don't hesitate to call.         Sincerely,          JACOB Leahy

## 2024-06-03 LAB
ALBUMIN SERPL BCP-MCNC: 4.3 G/DL (ref 3.5–5)
ALP SERPL-CCNC: 60 U/L (ref 34–104)
ALT SERPL W P-5'-P-CCNC: 25 U/L (ref 7–52)
ANION GAP SERPL CALCULATED.3IONS-SCNC: 8 MMOL/L (ref 4–13)
APTT PPP: 41 SECONDS (ref 23–37)
APTT PPP: 58 SECONDS (ref 23–37)
AST SERPL W P-5'-P-CCNC: 83 U/L (ref 13–39)
ATRIAL RATE: 129 BPM
ATRIAL RATE: 284 BPM
ATRIAL RATE: 71 BPM
ATRIAL RATE: 71 BPM
ATRIAL RATE: 88 BPM
BILIRUB SERPL-MCNC: 0.58 MG/DL (ref 0.2–1)
BUN SERPL-MCNC: 14 MG/DL (ref 5–25)
CALCIUM SERPL-MCNC: 9 MG/DL (ref 8.4–10.2)
CARDIAC TROPONIN I PNL SERPL HS: ABNORMAL NG/L (ref 8–18)
CHLORIDE SERPL-SCNC: 96 MMOL/L (ref 96–108)
CO2 SERPL-SCNC: 28 MMOL/L (ref 21–32)
CREAT SERPL-MCNC: 1.07 MG/DL (ref 0.6–1.3)
ERYTHROCYTE [DISTWIDTH] IN BLOOD BY AUTOMATED COUNT: 12.8 % (ref 11.6–15.1)
ERYTHROCYTE [DISTWIDTH] IN BLOOD BY AUTOMATED COUNT: 12.8 % (ref 11.6–15.1)
GFR SERPL CREATININE-BSD FRML MDRD: 73 ML/MIN/1.73SQ M
GLUCOSE SERPL-MCNC: 219 MG/DL (ref 65–140)
GLUCOSE SERPL-MCNC: 259 MG/DL (ref 65–140)
GLUCOSE SERPL-MCNC: 274 MG/DL (ref 65–140)
GLUCOSE SERPL-MCNC: 351 MG/DL (ref 65–140)
GLUCOSE SERPL-MCNC: 93 MG/DL (ref 65–140)
HCT VFR BLD AUTO: 42.5 % (ref 36.5–49.3)
HCT VFR BLD AUTO: 44.3 % (ref 36.5–49.3)
HGB BLD-MCNC: 14.4 G/DL (ref 12–17)
HGB BLD-MCNC: 15.1 G/DL (ref 12–17)
KCT BLD-ACNC: 401 SEC (ref 89–137)
MCH RBC QN AUTO: 30.4 PG (ref 26.8–34.3)
MCH RBC QN AUTO: 31.2 PG (ref 26.8–34.3)
MCHC RBC AUTO-ENTMCNC: 33.9 G/DL (ref 31.4–37.4)
MCHC RBC AUTO-ENTMCNC: 34.1 G/DL (ref 31.4–37.4)
MCV RBC AUTO: 90 FL (ref 82–98)
MCV RBC AUTO: 92 FL (ref 82–98)
P AXIS: 40 DEGREES
P AXIS: 60 DEGREES
PLATELET # BLD AUTO: 229 THOUSANDS/UL (ref 149–390)
PLATELET # BLD AUTO: 238 THOUSANDS/UL (ref 149–390)
PMV BLD AUTO: 10.4 FL (ref 8.9–12.7)
PMV BLD AUTO: 10.6 FL (ref 8.9–12.7)
POTASSIUM SERPL-SCNC: 3.5 MMOL/L (ref 3.5–5.3)
PR INTERVAL: 172 MS
PR INTERVAL: 178 MS
PROT SERPL-MCNC: 7.3 G/DL (ref 6.4–8.4)
QRS AXIS: 64 DEGREES
QRS AXIS: 68 DEGREES
QRS AXIS: 70 DEGREES
QRS AXIS: 72 DEGREES
QRS AXIS: 99 DEGREES
QRSD INTERVAL: 88 MS
QRSD INTERVAL: 90 MS
QRSD INTERVAL: 90 MS
QRSD INTERVAL: 92 MS
QRSD INTERVAL: 94 MS
QT INTERVAL: 430 MS
QT INTERVAL: 454 MS
QT INTERVAL: 462 MS
QT INTERVAL: 466 MS
QT INTERVAL: 478 MS
QTC INTERVAL: 502 MS
QTC INTERVAL: 505 MS
QTC INTERVAL: 506 MS
QTC INTERVAL: 519 MS
QTC INTERVAL: 520 MS
RBC # BLD AUTO: 4.74 MILLION/UL (ref 3.88–5.62)
RBC # BLD AUTO: 4.84 MILLION/UL (ref 3.88–5.62)
SODIUM SERPL-SCNC: 132 MMOL/L (ref 135–147)
SPECIMEN SOURCE: ABNORMAL
T WAVE AXIS: -69 DEGREES
T WAVE AXIS: 134 DEGREES
T WAVE AXIS: 168 DEGREES
T WAVE AXIS: 216 DEGREES
T WAVE AXIS: 242 DEGREES
VENTRICULAR RATE: 71 BPM
VENTRICULAR RATE: 79 BPM
VENTRICULAR RATE: 83 BPM
WBC # BLD AUTO: 13.38 THOUSAND/UL (ref 4.31–10.16)
WBC # BLD AUTO: 14.86 THOUSAND/UL (ref 4.31–10.16)

## 2024-06-03 PROCEDURE — 85347 COAGULATION TIME ACTIVATED: CPT

## 2024-06-03 PROCEDURE — NC001 PR NO CHARGE: Performed by: INTERNAL MEDICINE

## 2024-06-03 PROCEDURE — C1769 GUIDE WIRE: HCPCS | Performed by: INTERNAL MEDICINE

## 2024-06-03 PROCEDURE — 85027 COMPLETE CBC AUTOMATED: CPT | Performed by: INTERNAL MEDICINE

## 2024-06-03 PROCEDURE — 93010 ELECTROCARDIOGRAM REPORT: CPT | Performed by: INTERNAL MEDICINE

## 2024-06-03 PROCEDURE — 99223 1ST HOSP IP/OBS HIGH 75: CPT | Performed by: INTERNAL MEDICINE

## 2024-06-03 PROCEDURE — C1725 CATH, TRANSLUMIN NON-LASER: HCPCS | Performed by: INTERNAL MEDICINE

## 2024-06-03 PROCEDURE — 99232 SBSQ HOSP IP/OBS MODERATE 35: CPT

## 2024-06-03 PROCEDURE — 92928 PRQ TCAT PLMT NTRAC ST 1 LES: CPT | Performed by: INTERNAL MEDICINE

## 2024-06-03 PROCEDURE — 4A023N7 MEASUREMENT OF CARDIAC SAMPLING AND PRESSURE, LEFT HEART, PERCUTANEOUS APPROACH: ICD-10-PCS | Performed by: INTERNAL MEDICINE

## 2024-06-03 PROCEDURE — B2111ZZ FLUOROSCOPY OF MULTIPLE CORONARY ARTERIES USING LOW OSMOLAR CONTRAST: ICD-10-PCS | Performed by: INTERNAL MEDICINE

## 2024-06-03 PROCEDURE — C1874 STENT, COATED/COV W/DEL SYS: HCPCS | Performed by: INTERNAL MEDICINE

## 2024-06-03 PROCEDURE — C1894 INTRO/SHEATH, NON-LASER: HCPCS | Performed by: INTERNAL MEDICINE

## 2024-06-03 PROCEDURE — 99152 MOD SED SAME PHYS/QHP 5/>YRS: CPT | Performed by: INTERNAL MEDICINE

## 2024-06-03 PROCEDURE — 027035Z DILATION OF CORONARY ARTERY, ONE ARTERY WITH TWO DRUG-ELUTING INTRALUMINAL DEVICES, PERCUTANEOUS APPROACH: ICD-10-PCS | Performed by: INTERNAL MEDICINE

## 2024-06-03 PROCEDURE — 84484 ASSAY OF TROPONIN QUANT: CPT | Performed by: INTERNAL MEDICINE

## 2024-06-03 PROCEDURE — 93454 CORONARY ARTERY ANGIO S&I: CPT | Performed by: INTERNAL MEDICINE

## 2024-06-03 PROCEDURE — 85027 COMPLETE CBC AUTOMATED: CPT

## 2024-06-03 PROCEDURE — 82948 REAGENT STRIP/BLOOD GLUCOSE: CPT

## 2024-06-03 PROCEDURE — 80053 COMPREHEN METABOLIC PANEL: CPT | Performed by: INTERNAL MEDICINE

## 2024-06-03 PROCEDURE — 85730 THROMBOPLASTIN TIME PARTIAL: CPT | Performed by: INTERNAL MEDICINE

## 2024-06-03 PROCEDURE — C1887 CATHETER, GUIDING: HCPCS | Performed by: INTERNAL MEDICINE

## 2024-06-03 PROCEDURE — 85730 THROMBOPLASTIN TIME PARTIAL: CPT

## 2024-06-03 PROCEDURE — 99153 MOD SED SAME PHYS/QHP EA: CPT | Performed by: INTERNAL MEDICINE

## 2024-06-03 PROCEDURE — 93005 ELECTROCARDIOGRAM TRACING: CPT

## 2024-06-03 PROCEDURE — C9600 PERC DRUG-EL COR STENT SING: HCPCS | Performed by: INTERNAL MEDICINE

## 2024-06-03 DEVICE — EVEROLIMUS-ELUTING PLATINUM CHROMIUM CORONARY STENT SYSTEM
Type: IMPLANTABLE DEVICE | Status: FUNCTIONAL
Brand: SYNERGY™ XD

## 2024-06-03 RX ORDER — DIPHENHYDRAMINE HCL 25 MG
25 TABLET ORAL
Status: DISCONTINUED | OUTPATIENT
Start: 2024-06-03 | End: 2024-06-04 | Stop reason: HOSPADM

## 2024-06-03 RX ORDER — SODIUM CHLORIDE 9 MG/ML
125 INJECTION, SOLUTION INTRAVENOUS CONTINUOUS
Status: DISPENSED | OUTPATIENT
Start: 2024-06-03 | End: 2024-06-03

## 2024-06-03 RX ORDER — VERAPAMIL HYDROCHLORIDE 2.5 MG/ML
INJECTION, SOLUTION INTRAVENOUS CODE/TRAUMA/SEDATION MEDICATION
Status: DISCONTINUED | OUTPATIENT
Start: 2024-06-03 | End: 2024-06-03 | Stop reason: HOSPADM

## 2024-06-03 RX ORDER — NITROGLYCERIN 20 MG/100ML
INJECTION INTRAVENOUS CODE/TRAUMA/SEDATION MEDICATION
Status: DISCONTINUED | OUTPATIENT
Start: 2024-06-03 | End: 2024-06-03 | Stop reason: HOSPADM

## 2024-06-03 RX ORDER — MIDAZOLAM HYDROCHLORIDE 2 MG/2ML
INJECTION, SOLUTION INTRAMUSCULAR; INTRAVENOUS CODE/TRAUMA/SEDATION MEDICATION
Status: DISCONTINUED | OUTPATIENT
Start: 2024-06-03 | End: 2024-06-03 | Stop reason: HOSPADM

## 2024-06-03 RX ORDER — LIDOCAINE HYDROCHLORIDE 10 MG/ML
INJECTION, SOLUTION EPIDURAL; INFILTRATION; INTRACAUDAL; PERINEURAL CODE/TRAUMA/SEDATION MEDICATION
Status: DISCONTINUED | OUTPATIENT
Start: 2024-06-03 | End: 2024-06-03 | Stop reason: HOSPADM

## 2024-06-03 RX ORDER — CLOPIDOGREL BISULFATE 75 MG/1
75 TABLET ORAL DAILY
Status: DISCONTINUED | OUTPATIENT
Start: 2024-06-04 | End: 2024-06-04

## 2024-06-03 RX ORDER — SENNOSIDES 8.6 MG
1 TABLET ORAL
Status: DISCONTINUED | OUTPATIENT
Start: 2024-06-03 | End: 2024-06-04 | Stop reason: HOSPADM

## 2024-06-03 RX ORDER — DOCUSATE SODIUM 100 MG/1
100 CAPSULE, LIQUID FILLED ORAL 2 TIMES DAILY
Status: DISCONTINUED | OUTPATIENT
Start: 2024-06-03 | End: 2024-06-04 | Stop reason: HOSPADM

## 2024-06-03 RX ORDER — HEPARIN SODIUM 1000 [USP'U]/ML
INJECTION, SOLUTION INTRAVENOUS; SUBCUTANEOUS CODE/TRAUMA/SEDATION MEDICATION
Status: DISCONTINUED | OUTPATIENT
Start: 2024-06-03 | End: 2024-06-03 | Stop reason: HOSPADM

## 2024-06-03 RX ORDER — FENTANYL CITRATE 50 UG/ML
INJECTION, SOLUTION INTRAMUSCULAR; INTRAVENOUS CODE/TRAUMA/SEDATION MEDICATION
Status: DISCONTINUED | OUTPATIENT
Start: 2024-06-03 | End: 2024-06-03 | Stop reason: HOSPADM

## 2024-06-03 RX ORDER — METOCLOPRAMIDE HYDROCHLORIDE 5 MG/ML
10 INJECTION INTRAMUSCULAR; INTRAVENOUS EVERY 6 HOURS PRN
Status: DISCONTINUED | OUTPATIENT
Start: 2024-06-03 | End: 2024-06-04 | Stop reason: HOSPADM

## 2024-06-03 RX ADMIN — SODIUM CHLORIDE 125 ML/HR: 0.9 INJECTION, SOLUTION INTRAVENOUS at 12:25

## 2024-06-03 RX ADMIN — ATORVASTATIN CALCIUM 80 MG: 80 TABLET, FILM COATED ORAL at 17:19

## 2024-06-03 RX ADMIN — METOPROLOL TARTRATE 25 MG: 25 TABLET, FILM COATED ORAL at 19:46

## 2024-06-03 RX ADMIN — OXYCODONE HYDROCHLORIDE 10 MG: 10 TABLET ORAL at 13:22

## 2024-06-03 RX ADMIN — INSULIN GLARGINE 28 UNITS: 100 INJECTION, SOLUTION SUBCUTANEOUS at 21:50

## 2024-06-03 RX ADMIN — METOCLOPRAMIDE 10 MG: 5 INJECTION, SOLUTION INTRAMUSCULAR; INTRAVENOUS at 02:01

## 2024-06-03 RX ADMIN — DIPHENHYDRAMINE HCL 25 MG: 25 TABLET, COATED ORAL at 00:33

## 2024-06-03 RX ADMIN — SENNOSIDES 8.6 MG: 8.6 TABLET, FILM COATED ORAL at 21:49

## 2024-06-03 RX ADMIN — HEPARIN SODIUM 4000 UNITS: 1000 INJECTION INTRAVENOUS; SUBCUTANEOUS at 01:25

## 2024-06-03 RX ADMIN — PANTOPRAZOLE SODIUM 40 MG: 40 TABLET, DELAYED RELEASE ORAL at 06:16

## 2024-06-03 RX ADMIN — HEPARIN SODIUM 2000 UNITS: 1000 INJECTION INTRAVENOUS; SUBCUTANEOUS at 09:25

## 2024-06-03 RX ADMIN — LOSARTAN POTASSIUM 100 MG: 50 TABLET, FILM COATED ORAL at 08:00

## 2024-06-03 RX ADMIN — ONDANSETRON 4 MG: 2 INJECTION INTRAMUSCULAR; INTRAVENOUS at 15:19

## 2024-06-03 RX ADMIN — TICAGRELOR 90 MG: 90 TABLET ORAL at 21:50

## 2024-06-03 RX ADMIN — METOPROLOL TARTRATE 25 MG: 25 TABLET, FILM COATED ORAL at 00:15

## 2024-06-03 RX ADMIN — PAROXETINE 20 MG: 20 TABLET, FILM COATED ORAL at 08:00

## 2024-06-03 RX ADMIN — AMLODIPINE BESYLATE 10 MG: 10 TABLET ORAL at 08:00

## 2024-06-03 RX ADMIN — METOPROLOL TARTRATE 25 MG: 25 TABLET, FILM COATED ORAL at 06:16

## 2024-06-03 RX ADMIN — ACETAMINOPHEN 650 MG: 325 TABLET, FILM COATED ORAL at 19:46

## 2024-06-03 RX ADMIN — ASPIRIN 81 MG: 81 TABLET, COATED ORAL at 08:00

## 2024-06-03 RX ADMIN — MORPHINE SULFATE 2 MG: 2 INJECTION, SOLUTION INTRAMUSCULAR; INTRAVENOUS at 07:58

## 2024-06-03 RX ADMIN — Medication 1 PATCH: at 08:00

## 2024-06-03 RX ADMIN — METOCLOPRAMIDE 10 MG: 5 INJECTION, SOLUTION INTRAMUSCULAR; INTRAVENOUS at 07:58

## 2024-06-03 RX ADMIN — INSULIN LISPRO 6 UNITS: 100 INJECTION, SOLUTION INTRAVENOUS; SUBCUTANEOUS at 17:39

## 2024-06-03 RX ADMIN — DOCUSATE SODIUM 100 MG: 100 CAPSULE, LIQUID FILLED ORAL at 17:19

## 2024-06-03 RX ADMIN — MORPHINE SULFATE 2 MG: 2 INJECTION, SOLUTION INTRAMUSCULAR; INTRAVENOUS at 00:15

## 2024-06-03 RX ADMIN — METOPROLOL TARTRATE 25 MG: 25 TABLET, FILM COATED ORAL at 13:23

## 2024-06-03 NOTE — ASSESSMENT & PLAN NOTE
Elevated on arrival.  Continue amlodipine.  Holding HCTZ portion of losartan/HCT due to hyponatremia.  Continue Metoprolol increased to 25 mg every 6 hours per ED discussed with cardiology for better rate control of atrial fibrillation  BP stable

## 2024-06-03 NOTE — ED PROVIDER NOTES
History  Chief Complaint   Patient presents with    Chest Pain     Woke up with left sided cp, describes as sharp, non radiating     This is a 63-year-old male who has a known history of paroxysmal atrial fibrillation and is presenting today with chest pain that has been ongoing for approximately 1 day.  The patient reports that yesterday he was out working in his garden when he began to feel a tightening sensation on his left substernal with some radiation into his left arm.  Patient associated some mild shortness of breath with the symptoms.  He denies any diaphoresis.  He is denying any nausea.  He denies any other radiation of the pain.  He denies any known history of CAD.  No history of blood clot, no recent travel, surgery, immobilization, and no history of malignancy.        Prior to Admission Medications   Prescriptions Last Dose Informant Patient Reported? Taking?   ALPRAZolam (XANAX) 0.25 mg tablet Past Week Spouse/Significant Other No Yes   Sig: TAKE 1 TABLET BY MOUTH TWICE DAILY AS NEEDED FOR ANXIETY   Insulin Glargine Solostar (Lantus SoloStar) 100 UNIT/ML SOPN  Spouse/Significant Other No No   Sig: Inject 0.28 mL (28 Units total) under the skin daily at bedtime   Insulin Pen Needle (TechLite Pen Needles) 31G X 8 MM MISC  Spouse/Significant Other No No   Sig: Apply topically 4 (four) times a day   Lancets Misc. (Accu-Chek FastClix Lancet) KIT  Spouse/Significant Other No No   Sig: Inject under the skin 2 (two) times a day   PARoxetine (PAXIL) 20 mg tablet  Spouse/Significant Other No No   Sig: Take 1 tablet (20 mg total) by mouth daily   albuterol (PROVENTIL HFA,VENTOLIN HFA) 90 mcg/act inhaler  Spouse/Significant Other No No   Sig: Inhale 2 puffs every 6 (six) hours as needed for wheezing or shortness of breath   amLODIPine (NORVASC) 10 mg tablet  Spouse/Significant Other No No   Sig: Take 1 tablet (10 mg total) by mouth daily   amiodarone 200 mg tablet   No No   Sig: Take 1 tablet (200 mg total) by  mouth daily Starting 3/23/24   dulaglutide (Trulicity) 0.75 MG/0.5ML injection  Spouse/Significant Other No No   Sig: Inject 0.5 mL (0.75 mg total) under the skin every 7 days   fluticasone (Flovent HFA) 44 mcg/act inhaler  Spouse/Significant Other No No   Sig: Inhale 2 puffs 2 (two) times a day Rinse mouth after use.   Patient not taking: Reported on 2/15/2024   glucose blood test strip  Spouse/Significant Other No No   Si each by Other route 2 (two) times a day Use as instructed   losartan-hydrochlorothiazide (HYZAAR) 100-25 MG per tablet  Spouse/Significant Other No No   Sig: Take 1 tablet by mouth daily   metFORMIN (GLUCOPHAGE) 1000 MG tablet  Spouse/Significant Other No Yes   Sig: Take 1 tablet (1,000 mg total) by mouth 2 (two) times a day with meals   metoprolol succinate (TOPROL-XL) 25 mg 24 hr tablet   No Yes   Sig: Take 1 tablet (25 mg total) by mouth daily   oxyCODONE (ROXICODONE) 30 MG immediate release tablet  Spouse/Significant Other Yes No   pantoprazole (PROTONIX) 40 mg tablet  Spouse/Significant Other No No   Sig: Take 1 tablet (40 mg total) by mouth daily   polyethylene glycol (MIRALAX) 17 g packet   No No   Sig: Take 17 g by mouth daily for 5 days   rivaroxaban (Xarelto) 15 mg tablet   No Yes   Sig: Take 1 tablet (15 mg total) by mouth daily with breakfast   simvastatin (ZOCOR) 40 mg tablet  Spouse/Significant Other No No   Sig: Take 1 tablet (40 mg total) by mouth daily at bedtime   zolpidem (AMBIEN) 10 mg tablet  Spouse/Significant Other Yes No   Sig: Take 10 mg by mouth daily at bedtime as needed for sleep      Facility-Administered Medications: None       Past Medical History:   Diagnosis Date    Depression     Diabetes mellitus (HCC)     Hyperlipidemia     Hypertension     Tobacco abuse        Past Surgical History:   Procedure Laterality Date    APPENDECTOMY      SHOULDER ARTHROSCOPY         History reviewed. No pertinent family history.  I have reviewed and agree with the history as  documented.    E-Cigarette/Vaping    E-Cigarette Use Never User      E-Cigarette/Vaping Substances    Nicotine Yes      Social History     Tobacco Use    Smoking status: Every Day     Current packs/day: 1.00     Types: Cigarettes     Passive exposure: Current    Smokeless tobacco: Current   Vaping Use    Vaping status: Never Used   Substance Use Topics    Alcohol use: No    Drug use: No        Review of Systems   Constitutional:  Negative for chills, fatigue and fever.   HENT:  Negative for congestion and sore throat.    Eyes:  Negative for pain and visual disturbance.   Respiratory:  Positive for chest tightness and shortness of breath. Negative for cough.    Cardiovascular:  Positive for chest pain. Negative for palpitations.   Gastrointestinal:  Negative for abdominal pain, blood in stool, constipation, diarrhea, nausea and vomiting.   Genitourinary:  Negative for dysuria, flank pain and hematuria.   Musculoskeletal:  Negative for arthralgias, back pain and neck pain.   Skin:  Negative for color change and rash.   Neurological:  Negative for dizziness, syncope and light-headedness.   Hematological:  Negative for adenopathy. Does not bruise/bleed easily.   All other systems reviewed and are negative.      Physical Exam  ED Triage Vitals   Temperature Pulse Respirations Blood Pressure SpO2   06/02/24 1742 06/02/24 1729 06/02/24 1729 06/02/24 1733 06/02/24 1733   98.6 °F (37 °C) (!) 127 20 139/99 98 %      Temp Source Heart Rate Source Patient Position - Orthostatic VS BP Location FiO2 (%)   06/02/24 1742 06/02/24 1729 06/02/24 1733 06/02/24 1733 --   Oral Monitor Lying Right arm       Pain Score       06/02/24 1745       8             Orthostatic Vital Signs  Vitals:    06/02/24 1849 06/02/24 1900 06/02/24 1930 06/02/24 2000   BP: 161/77 161/77 141/74 133/97   Pulse: (!) 121 97 72 69   Patient Position - Orthostatic VS: Lying Lying Lying Lying       Physical Exam  Vitals and nursing note reviewed.   Constitutional:        General: He is not in acute distress.     Appearance: He is well-developed. He is obese. He is not toxic-appearing or diaphoretic.   HENT:      Head: Normocephalic and atraumatic.      Right Ear: External ear normal.      Left Ear: External ear normal.      Nose: Nose normal. No congestion or rhinorrhea.      Mouth/Throat:      Mouth: Mucous membranes are moist.      Pharynx: No oropharyngeal exudate or posterior oropharyngeal erythema.   Eyes:      General: No scleral icterus.     Extraocular Movements: Extraocular movements intact.      Conjunctiva/sclera: Conjunctivae normal.      Pupils: Pupils are equal, round, and reactive to light.   Cardiovascular:      Rate and Rhythm: Tachycardia present. Rhythm irregular.      Pulses: Normal pulses.      Heart sounds: No murmur heard.  Pulmonary:      Effort: Pulmonary effort is normal. No respiratory distress.      Breath sounds: Normal breath sounds. No wheezing or rales.   Abdominal:      Palpations: Abdomen is soft. There is no mass.      Tenderness: There is no abdominal tenderness. There is no right CVA tenderness, left CVA tenderness or guarding.      Hernia: No hernia is present.   Musculoskeletal:         General: No swelling. Normal range of motion.      Cervical back: Normal range of motion and neck supple.      Right lower leg: No edema.      Left lower leg: No edema.   Skin:     General: Skin is warm and dry.      Capillary Refill: Capillary refill takes less than 2 seconds.   Neurological:      General: No focal deficit present.      Mental Status: He is alert and oriented to person, place, and time.   Psychiatric:         Mood and Affect: Mood normal.         Behavior: Behavior normal.         ED Medications  Medications   sodium chloride (PF) 0.9 % injection 3 mL (has no administration in time range)   metoprolol tartrate (LOPRESSOR) tablet 25 mg (25 mg Oral Given 6/2/24 9021)   heparin (porcine) 25,000 units in 0.45% NaCl 250 mL infusion (premix) (  Intravenous Continue to Inpatient Floor 6/2/24 1950)   heparin (porcine) injection 4,000 Units (has no administration in time range)   heparin (porcine) injection 2,000 Units (has no administration in time range)   albuterol (PROVENTIL HFA,VENTOLIN HFA) inhaler 2 puff (has no administration in time range)   ALPRAZolam (XANAX) tablet 0.25 mg (has no administration in time range)   amLODIPine (NORVASC) tablet 10 mg (has no administration in time range)   insulin glargine (LANTUS) subcutaneous injection 28 Units 0.28 mL (28 Units Subcutaneous Given 6/2/24 2110)   oxyCODONE (ROXICODONE) immediate release tablet 30 mg (has no administration in time range)   pantoprazole (PROTONIX) EC tablet 40 mg (has no administration in time range)   PARoxetine (PAXIL) tablet 20 mg (has no administration in time range)   zolpidem (AMBIEN) tablet 10 mg (has no administration in time range)   insulin lispro (HumALOG/ADMELOG) 100 units/mL subcutaneous injection 1-6 Units (3 Units Subcutaneous Given 6/2/24 2110)   losartan (COZAAR) tablet 100 mg (has no administration in time range)   nicotine (NICODERM CQ) 14 mg/24hr TD 24 hr patch 1 patch (has no administration in time range)   acetaminophen (TYLENOL) tablet 650 mg (650 mg Oral Given 6/2/24 2138)   ondansetron (ZOFRAN) injection 4 mg (has no administration in time range)   oxyCODONE (ROXICODONE) immediate release tablet 10 mg (has no administration in time range)   HYDROmorphone (DILAUDID) injection 1 mg (1 mg Intravenous Given 6/2/24 2109)   aspirin (ECOTRIN LOW STRENGTH) EC tablet 81 mg (has no administration in time range)   atorvastatin (LIPITOR) tablet 80 mg (80 mg Oral Given 6/2/24 2138)   aspirin chewable tablet 324 mg (324 mg Oral Given 6/2/24 1745)   fentaNYL injection 50 mcg (50 mcg Intravenous Given 6/2/24 1754)   diltiazem (CARDIZEM) injection 15 mg (15 mg Intravenous Given 6/2/24 1820)   sodium chloride 0.9 % bolus 1,000 mL (0 mL Intravenous Stopped 6/2/24 1941)   morphine  injection 4 mg (4 mg Intravenous Given 6/2/24 1825)   nitroglycerin (NITROSTAT) SL tablet 0.4 mg (0.4 mg Sublingual Given 6/2/24 1843)   heparin (porcine) injection 4,000 Units (4,000 Units Intravenous Given 6/2/24 1853)   metoprolol (LOPRESSOR) injection 5 mg (5 mg Intravenous Given 6/2/24 1909)   ondansetron (ZOFRAN) injection 4 mg (4 mg Intravenous Given 6/2/24 1905)   nitroglycerin (NITRO-BID) 2 % TD ointment 1 inch (1 inch Topical Given 6/2/24 2020)       Diagnostic Studies  Results Reviewed       Procedure Component Value Units Date/Time    HS Troponin I 2hr [[id number]]  (Abnormal) Collected: 06/02/24 1941    Lab Status: Final result Specimen: Blood from Arm, Right Updated: 06/02/24 2009     hs TnI 2hr 3,132 ng/L      Delta 2hr hsTnI 2,223 ng/L     APTT six (6) hours after Heparin bolus/drip initiation or dosing change [[id number]]     Lab Status: No result Specimen: Blood     HS Troponin I 4hr [[id number]]     Lab Status: No result Specimen: Blood     High Sensitivity Troponin I Random [[id number]]  (Abnormal) Collected: 06/02/24 1838    Lab Status: Final result Specimen: Blood from Arm, Right Updated: 06/02/24 1915     HS TnI random 1,215 ng/L     Protime-INR [[id number]]  (Normal) Collected: 06/02/24 1747    Lab Status: Final result Specimen: Blood from Arm, Right Updated: 06/02/24 1904     Protime 12.5 seconds      INR 0.91    APTT [[id number]]  (Normal) Collected: 06/02/24 1747    Lab Status: Final result Specimen: Blood from Arm, Right Updated: 06/02/24 1904     PTT 24 seconds     D-dimer, quantitative [[id number]]  (Abnormal) Collected: 06/02/24 1747    Lab Status: Final result Specimen: Blood from Arm, Right Updated: 06/02/24 1854     D-Dimer, Quant 0.52 ug/ml FEU     Narrative:      In the evaluation for possible pulmonary embolism, in the appropriate (Well's Score of 4 or less) patient, the age adjusted d-dimer cutoff for this patient can be calculated as:    Age x 0.01 (in ug/mL) for Age-adjusted D-dimer  exclusion threshold for a patient over 50 years.    HS Troponin 0hr (reflex protocol) [[id number]]  (Abnormal) Collected: 06/02/24 1747    Lab Status: Final result Specimen: Blood from Arm, Right Updated: 06/02/24 1816     hs TnI 0hr 909 ng/L     B-Type Natriuretic Peptide(BNP) [[id number]]  (Abnormal) Collected: 06/02/24 1747    Lab Status: Final result Specimen: Blood from Arm, Right Updated: 06/02/24 1815      pg/mL     Comprehensive metabolic panel [[id number]]  (Abnormal) Collected: 06/02/24 1747    Lab Status: Final result Specimen: Blood from Arm, Right Updated: 06/02/24 1813     Sodium 129 mmol/L      Potassium 4.2 mmol/L      Chloride 93 mmol/L      CO2 24 mmol/L      ANION GAP 12 mmol/L      BUN 13 mg/dL      Creatinine 1.15 mg/dL      Glucose 356 mg/dL      Calcium 9.6 mg/dL      AST 31 U/L      ALT 20 U/L      Alkaline Phosphatase 67 U/L      Total Protein 8.2 g/dL      Albumin 4.7 g/dL      Total Bilirubin 0.60 mg/dL      eGFR 67 ml/min/1.73sq m     Narrative:      [employer] guidelines for Chronic Kidney Disease (CKD):     Stage 1 with normal or high GFR (GFR > 90 mL/min/1.73 square meters)    Stage 2 Mild CKD (GFR = 60-89 mL/min/1.73 square meters)    Stage 3A Moderate CKD (GFR = 45-59 mL/min/1.73 square meters)    Stage 3B Moderate CKD (GFR = 30-44 mL/min/1.73 square meters)    Stage 4 Severe CKD (GFR = 15-29 mL/min/1.73 square meters)    Stage 5 End Stage CKD (GFR <15 mL/min/1.73 square meters)  Note: GFR calculation is accurate only with a steady state creatinine    CBC and differential [[id number]]  (Abnormal) Collected: 06/02/24 1747    Lab Status: Final result Specimen: Blood from Arm, Right Updated: 06/02/24 1752     WBC 14.42 Thousand/uL      RBC 5.22 Million/uL      Hemoglobin 16.3 g/dL      Hematocrit 47.3 %      MCV 91 fL      MCH 31.2 pg      MCHC 34.5 g/dL      RDW 12.7 %      MPV 10.7 fL      Platelets 259 Thousands/uL      nRBC 0 /100 WBCs      Segmented % 75  %      Immature Grans % 0 %      Lymphocytes % 17 %      Monocytes % 7 %      Eosinophils Relative 1 %      Basophils Relative 0 %      Absolute Neutrophils 10.80 Thousands/µL      Absolute Immature Grans 0.05 Thousand/uL      Absolute Lymphocytes 2.42 Thousands/µL      Absolute Monocytes 1.02 Thousand/µL      Eosinophils Absolute 0.08 Thousand/µL      Basophils Absolute 0.05 Thousands/µL                    X-ray chest 1 view portable   Final Result by Carmencita Martinez MD (06/02 2015)      No acute cardiopulmonary disease.            Workstation performed: [id number]               Procedures  Procedures      ED Course  ED Course as of 06/02/24 2149   Sun Jun 02, 2024   1903 D-Dimer, Quant(!): 0.52  Adjusted negative               HEART Risk Score      Flowsheet Row Most Recent Value   Heart Score Risk Calculator    History 2 Filed at: 06/02/2024 1902   ECG 2 Filed at: 06/02/2024 1902   Age 1 Filed at: 06/02/2024 1902   Risk Factors 2 Filed at: 06/02/2024 1902   Troponin 2 Filed at: 06/02/2024 1902   HEART Score 9 Filed at: 06/02/2024 1902                        SBIRT 22yo+      Flowsheet Row Most Recent Value   Initial Alcohol Screen: US AUDIT-C     1. How often do you have a drink containing alcohol? 0 Filed at: 06/02/2024 1735   2. How many drinks containing alcohol do you have on a typical day you are drinking?  0 Filed at: 06/02/2024 1735   3a. Male UNDER 65: How often do you have five or more drinks on one occasion? 0 Filed at: 06/02/2024 1735   Audit-C Score 0 Filed at: 06/02/2024 1735   COLLETTE: How many times in the past year have you...    Used an illegal drug or used a prescription medication for non-medical reasons? Never Filed at: 06/02/2024 1735                  Medical Decision Making  Patient's ECG interpreted by me.  Date: 6/2/2024.  Time: 1729.  Rate: 114 bpm.  Axis: Rightward.  Interpretation: This is an abnormal ECG with atrial fibrillation with rapid ventricular response, diffuse ST depressions  noted, with slight ST elevation noted in aVR.  There are T wave abnormalities in lead II, lead III, and a deeper T wave noted in V5 and V6.  These are significant changes from prior from 2/6/2022.  Cardiology consulted, awaiting recs.    Complexity: Patient presenting with multiple risk factors for ACS and approximately 1 day history of chest pain which is suspicious and the fact that it is left-sided radiating into the left arm with some exertional component.  Patient's ECG is also demonstrably different from prior with diffuse ST depressions and slight ST elevation in lead aVR.  Suspicious for ACS.  Patient also in atrial fibrillation with rapid ventricular response, will treat with home dose of metoprolol, and continue aggressive rate control as this may be exacerbating patient's changes on his ECG.    Reassessment: Patient continues to have significant chest pain and spite of multiple doses of nitroglycerin, fentanyl, and morphine.  His initial troponin came back elevated greater than 900, cardiology is recommending ongoing aggressive rate control, and serial EKGs, serial troponin values, and low threshold to cath.  Patient will be admitted to internal medicine service with heparin drip, scheduled metoprolol for rate control, and ongoing monitoring for acute ischemic changes on ECG.    Amount and/or Complexity of Data Reviewed  Labs: ordered. Decision-making details documented in ED Course.  Radiology: ordered.    Risk  OTC drugs.  Prescription drug management.  Decision regarding hospitalization.          Disposition  Final diagnoses:   Chest pain   NSTEMI (non-ST elevated myocardial infarction) (HCC)     Time reflects when diagnosis was documented in both MDM as applicable and the Disposition within this note       Time User Action Codes Description Comment    6/2/2024  6:23 PM Ismael Garcia Add [R07.9] Chest pain     6/2/2024  6:34 PM Ismael Garcia Add [I21.4] NSTEMI (non-ST elevated myocardial  infarction) ([facility])           ED Disposition       ED Disposition   Admit    Condition   Stable    Date/Time   Sun Jun 2, 2024 1902    Comment   Case was discussed with Dr. Reeder and the patient's admission status was agreed to be Admission Status: inpatient status to the service of Dr. Reeder .               Follow-up Information    None         Current Discharge Medication List        CONTINUE these medications which have NOT CHANGED    Details   ALPRAZolam (XANAX) 0.25 mg tablet TAKE 1 TABLET BY MOUTH TWICE DAILY AS NEEDED FOR ANXIETY  Qty: 60 tablet, Refills: 0    Associated Diagnoses: Generalized anxiety disorder      metFORMIN (GLUCOPHAGE) 1000 MG tablet Take 1 tablet (1,000 mg total) by mouth 2 (two) times a day with meals  Qty: 90 tablet, Refills: 3    Associated Diagnoses: Type 2 diabetes mellitus without complication, with long-term current use of insulin ([facility])      metoprolol succinate (TOPROL-XL) 25 mg 24 hr tablet Take 1 tablet (25 mg total) by mouth daily  Qty: 90 tablet, Refills: 3    Associated Diagnoses: Paroxysmal atrial fibrillation ([facility])      rivaroxaban (Xarelto) 15 mg tablet Take 1 tablet (15 mg total) by mouth daily with breakfast  Qty: 90 tablet, Refills: 3    Associated Diagnoses: Paroxysmal atrial fibrillation ([facility])      albuterol (PROVENTIL HFA,VENTOLIN HFA) 90 mcg/act inhaler Inhale 2 puffs every 6 (six) hours as needed for wheezing or shortness of breath  Qty: 8 g, Refills: 1    Comments: Substitution to a formulary equivalent within the same pharmaceutical class is authorized.  Associated Diagnoses: Cough      amiodarone 200 mg tablet Take 1 tablet (200 mg total) by mouth daily Starting 3/23/24  Qty: 90 tablet, Refills: 1    Associated Diagnoses: Paroxysmal atrial fibrillation ([facility])      amLODIPine (NORVASC) 10 mg tablet Take 1 tablet (10 mg total) by mouth daily  Qty: 90 tablet, Refills: 1    Associated Diagnoses: Essential hypertension      dulaglutide (Trulicity) 0.75 MG/0.5ML injection  Inject 0.5 mL (0.75 mg total) under the skin every 7 days  Qty: 6 mL, Refills: 1    Associated Diagnoses: Type 2 diabetes mellitus without complication, with long-term current use of insulin ([facility])      fluticasone (Flovent HFA) 44 mcg/act inhaler Inhale 2 puffs 2 (two) times a day Rinse mouth after use.  Qty: 10.6 g, Refills: 1    Associated Diagnoses: Cough      glucose blood test strip 1 each by Other route 2 (two) times a day Use as instructed  Qty: 100 each, Refills: 1    Associated Diagnoses: Type 2 diabetes mellitus without complication, with long-term current use of insulin ([facility])      Insulin Glargine Solostar (Lantus SoloStar) 100 UNIT/ML SOPN Inject 0.28 mL (28 Units total) under the skin daily at bedtime  Qty: 21 mL, Refills: 1    Associated Diagnoses: Type 2 diabetes mellitus without complication, with long-term current use of insulin ([facility])      Insulin Pen Needle ([employer] Pen Needles) 31G X 8 MM MISC Apply topically 4 (four) times a day  Qty: 360 each, Refills: 0    Associated Diagnoses: Type 2 diabetes mellitus without complication, with long-term current use of insulin ([facility])      Lancets Misc. (Accu-Chek FastClix Lancet) KIT Inject under the skin 2 (two) times a day  Qty: 100 kit, Refills: 2    Associated Diagnoses: Type 2 diabetes mellitus without complication, with long-term current use of insulin ([facility])      losartan-hydrochlorothiazide (HYZAAR) 100-25 MG per tablet Take 1 tablet by mouth daily  Qty: 90 tablet, Refills: 1    Associated Diagnoses: Essential hypertension      oxyCODONE (ROXICODONE) 30 MG immediate release tablet       pantoprazole (PROTONIX) 40 mg tablet Take 1 tablet (40 mg total) by mouth daily  Qty: 30 tablet, Refills: 1    Associated Diagnoses: Gastroesophageal reflux disease without esophagitis      PARoxetine (PAXIL) 20 mg tablet Take 1 tablet (20 mg total) by mouth daily  Qty: 30 tablet, Refills: 2    Associated Diagnoses: Generalized anxiety disorder      polyethylene glycol  (MIRALAX) 17 g packet Take 17 g by mouth daily for 5 days  Qty: 85 g, Refills: 0    Associated Diagnoses: Constipation      simvastatin (ZOCOR) 40 mg tablet Take 1 tablet (40 mg total) by mouth daily at bedtime  Qty: 90 tablet, Refills: 1    Associated Diagnoses: Type 2 diabetes mellitus without complication, with long-term current use of insulin (HCC); Essential hypertension      zolpidem (AMBIEN) 10 mg tablet Take 10 mg by mouth daily at bedtime as needed for sleep           No discharge procedures on file.    PDMP Review         Value Time User    PDMP Reviewed  Yes 6/2/2024  7:25 PM Cresencio Reeder DO             ED Provider  Attending physically available and evaluated Gulshan Arias. I managed the patient along with the ED Attending.    Electronically Signed by           Ismael Garcia MD  06/02/24 9944

## 2024-06-03 NOTE — ASSESSMENT & PLAN NOTE
Partially due to hyperglycemia  Will hold HCTZ component of losartan/HCT  Restart HCTZ when appropriate    Results from last 7 days   Lab Units 06/02/24  1747   SODIUM mmol/L 129*

## 2024-06-03 NOTE — ASSESSMENT & PLAN NOTE
Elevated on arrival.  Continue amlodipine.  Holding HCTZ portion of losartan/HCT due to hyponatremia.  Metoprolol increased to 25 mg every 6 hours per ED discussed with cardiology for better rate control of atrial fibrillation

## 2024-06-03 NOTE — ASSESSMENT & PLAN NOTE
History of atrial fibrillation insulin-dependent diabetes hypertension anxiety and chronic pain presented to the hospital with chest pain found to have elevated cardiac enzymes and atrial fibrillation/RVR  Plan of care discussed by ED with cardiology: Recommending metoprolol 25 mg every 6 hours with heparin infusion.  Continue patient amiodarone.  Holding xarelto.  Caffeine consumption: Drinks 5 to 6 cups of coffee a day.  Advised to cut down as this may be contributing to his tachycardia.

## 2024-06-03 NOTE — ASSESSMENT & PLAN NOTE
"Lab Results   Component Value Date    HGBA1C 9.5 (A) 01/04/2024       No results for input(s): \"POCGLU\" in the last 72 hours.    Hyperglycemia upon arrival.  Continue glargine 28 units.  Add sliding scale.  "

## 2024-06-03 NOTE — ASSESSMENT & PLAN NOTE
Partially due to hyperglycemia  Will hold HCTZ component of losartan/HCT  HCTZ resumed    Results from last 7 days   Lab Units 06/04/24  0528 06/03/24  0621 06/02/24  1747   SODIUM mmol/L 134* 132* 129*

## 2024-06-03 NOTE — PROGRESS NOTES
Patient seen and examined.    Qatari interpretor used on language line.    Admitted with CP, rapid AF and elevated troponin. He has DM with A1C 9.2.    Cardiac cath is indicated.    I explained the procedure in detail to the patient including the risks through the Qatari interpretor and he is agreeable.

## 2024-06-03 NOTE — ASSESSMENT & PLAN NOTE
History of atrial fibrillation insulin-dependent diabetes hypertension anxiety and chronic pain presented to the hospital with chest pain found to have elevated cardiac enzymes and atrial fibrillation/RVR  Plan of care discussed by ED with cardiology: Recommending metoprolol 25 mg every 6 hours with heparin infusion.  Continue patient amiodarone  Patient on Heparin drip on admission  Caffeine consumption: Drinks 5 to 6 cups of coffee a day.  Advised to cut down as this may be contributing to his tachycardia.  Rate controlled on telemetry  Transitioned off Heparin drip to Brilinta, Xarelto, and ASA 81  Aspirin for two weeks then stop taking.  Will need to continue Brilinta and Xarelto for a year  Start Amiodarone 400 mg TID for 4 days then 200 mg daily after

## 2024-06-03 NOTE — PROGRESS NOTES
[facility]  Progress Note  Name: Gulshan Arias I MRN: [id number]  Unit/Bed#: E4 -01I Date of Admission: 6/2/2024   Date of Service: 6/2/2024  I Hospital Day: 1    * Rapid atrial fibrillation (HCC)  Assessment & Plan  History of atrial fibrillation insulin-dependent diabetes hypertension anxiety and chronic pain presented to the hospital with chest pain found to have elevated cardiac enzymes and atrial fibrillation/RVR  Plan of care discussed by ED with cardiology: Recommending metoprolol 25 mg every 6 hours with heparin infusion.  Continue patient amiodarone  Holding xarelto, patient on Heparin drip per cardiology  Caffeine consumption: Drinks 5 to 6 cups of coffee a day.  Advised to cut down as this may be contributing to his tachycardia.  Rate controlled on telemetry  Telemetry continued for suspected MI    NSTEMI (non-ST elevated myocardial infarction) (HCC)  Assessment & Plan  Patient admitted on Sunday 6/2 with severe chest pain.  Chest pain initially started Friday night and worsened through the weekend until presentation to ED  Chest pain with elevated cardiac enzymes secondary to atrial fibrillation/tachycardia.    6/2 CXR: No acute cardiopulmonary disease  EKG: T wave inversions lateral leads  Continue Heparin drip  PRN IV Morphine ordered, bowel regimen initiated  Continued severe chest pain, repeat EKG this am appears unchanged; cardiology notified  Repeat random trop, pending  Pending for cardiac catheterization this morning 6/3  Continue telemetry    Lab Results   Component Value Date    HSTNI0 909 (H) 06/02/2024    HSTNI2 3,132 (H) 06/02/2024    HSTNI4 10,773 (H) 06/02/2024    HSTNI 1,215 (H) 06/02/2024       Hyponatremia  Assessment & Plan  Partially due to hyperglycemia  Will hold HCTZ component of losartan/HCT  Restart HCTZ when appropriate    Results from last 7 days   Lab Units 06/03/24  0621 06/02/24  1747   SODIUM mmol/L 132* 129*       Generalized  anxiety disorder  Assessment & Plan  Continue paroxetine with alprazolam as needed    Chronic midline low back pain without sciatica  Assessment & Plan  Confirmed on .  On oxycodone 30 mg as needed    Primary insomnia  Assessment & Plan  Prior to admission on zolpidem.  Confirmed on .    Type 2 diabetes mellitus without complication, with long-term current use of insulin ([facility])  Assessment & Plan  Lab Results   Component Value Date    HGBA1C 9.5 (A) 01/04/2024       Recent Labs     06/02/24 2008 06/03/24  0723   POCGLU 268* 274*       Hyperglycemia upon arrival.  Continue glargine 28 units at HS  Continue SSI  NPO for cath lab 6/3  Diabetic diet when able to eat    Essential hypertension  Assessment & Plan  Elevated on arrival.  Continue amlodipine.  Holding HCTZ portion of losartan/HCT due to hyponatremia.  Continue Metoprolol increased to 25 mg every 6 hours per ED discussed with cardiology for better rate control of atrial fibrillation  Continued hypertension suspected to be aggravated by severe pain          VTE Pharmacologic Prophylaxis: VTE Score: 3 Moderate Risk (Score 3-4) - Pharmacological DVT Prophylaxis Ordered: heparin drip.    Mobility:   Basic Mobility Inpatient Raw Score: 23  JH-HLM Goal: 7: Walk 25 feet or more  JH-HLM Achieved: 7: Walk 25 feet or more  JH-HLM Goal achieved. Continue to encourage appropriate mobility.    Patient Centered Rounds: I performed bedside rounds with nursing staff today.  FRANCESCA Coronado  Discussions with Specialists or Other Care Team Provider: Cardiology    Education and Discussions with Family / Patient: Updated  (wife) at bedside.  #[id number]    Total Time Spent on Date of Encounter in care of patient: 55 mins. This time was spent on one or more of the following: performing physical exam; counseling and coordination of care; obtaining or reviewing history; documenting in the medical record; reviewing/ordering tests, medications or  procedures; communicating with other healthcare professionals and discussing with patient's family/caregivers.    Current Length of Stay: 1 day(s)  Current Patient Status: Inpatient   Certification Statement: The patient will continue to require additional inpatient hospital stay due to Heparin drip, chest pain, cath lab  Discharge Plan: Anticipate discharge in 24-48 hrs to home.    Code Status: Level 1 - Full Code    Subjective:   Patient having severe chest pain rated at 7-8/10 that starts on left side of chest and radiates down.  Slight chest pain started on Friday night that he thought was musculoskeletal until it continued to worsen over the weekend until it was severe enough to come to the hospital yesterday.  He has a little nausea as well this morning.  He has had some dizziness/lightheadedness with ambulation since pain started.  Last bowel movement was yesterday.  No other questions or complaints at this time.    Objective:     Vitals:   Temp (24hrs), Av °F (36.7 °C), Min:97.7 °F (36.5 °C), Max:98.6 °F (37 °C)    Temp:  [97.7 °F (36.5 °C)-98.6 °F (37 °C)] 97.8 °F (36.6 °C)  HR:  [] 73  Resp:  [15-20] 18  BP: (133-204)/() 151/92  SpO2:  [92 %-99 %] 92 %  Body mass index is 32.66 kg/m².     Input and Output Summary (last 24 hours):     Intake/Output Summary (Last 24 hours) at 6/3/2024 0843  Last data filed at 2024 2144  Gross per 24 hour   Intake 1480 ml   Output --   Net 1480 ml       Physical Exam:   Physical Exam  Vitals and nursing note reviewed.   Constitutional:       Appearance: Normal appearance. He is obese.   HENT:      Head: Normocephalic.      Nose: Nose normal.      Mouth/Throat:      Mouth: Mucous membranes are moist.      Pharynx: Oropharynx is clear.   Eyes:      Conjunctiva/sclera: Conjunctivae normal.   Cardiovascular:      Rate and Rhythm: Normal rate. Rhythm irregular.      Pulses: Normal pulses.      Heart sounds: Normal heart sounds. No murmur heard.     Comments:  Severe chest pain 7-8/10  Pulmonary:      Effort: Respiratory distress present.      Breath sounds: Normal breath sounds. No wheezing.   Chest:      Chest wall: Tenderness present.   Abdominal:      General: Bowel sounds are normal. There is no distension.      Palpations: Abdomen is soft.      Tenderness: There is generalized abdominal tenderness. There is no guarding.   Musculoskeletal:         General: Normal range of motion.      Right lower leg: No edema.      Left lower leg: No edema.   Skin:     General: Skin is warm and dry.      Capillary Refill: Capillary refill takes less than 2 seconds.   Neurological:      General: No focal deficit present.      Mental Status: He is alert and oriented to person, place, and time. Mental status is at baseline.   Psychiatric:         Behavior: Behavior normal.         Thought Content: Thought content normal.          Additional Data:     Labs:  Results from last 7 days   Lab Units 06/03/24 0621 06/02/24  1747   WBC Thousand/uL 13.38* 14.42*   HEMOGLOBIN g/dL 15.1 16.3   HEMATOCRIT % 44.3 47.3   PLATELETS Thousands/uL 238 259   SEGS PCT %  --  75   LYMPHO PCT %  --  17   MONO PCT %  --  7   EOS PCT %  --  1     Results from last 7 days   Lab Units 06/03/24  0621   SODIUM mmol/L 132*   POTASSIUM mmol/L 3.5   CHLORIDE mmol/L 96   CO2 mmol/L 28   BUN mg/dL 14   CREATININE mg/dL 1.07   ANION GAP mmol/L 8   CALCIUM mg/dL 9.0   ALBUMIN g/dL 4.3   TOTAL BILIRUBIN mg/dL 0.58   ALK PHOS U/L 60   ALT U/L 25   AST U/L 83*   GLUCOSE RANDOM mg/dL 259*     Results from last 7 days   Lab Units 06/02/24  1747   INR  0.91     Results from last 7 days   Lab Units 06/03/24  0723 06/02/24 2008   POC GLUCOSE mg/dl 274* 268*               Lines/Drains:  Invasive Devices       Peripheral Intravenous Line  Duration             Peripheral IV 06/02/24 Dorsal (posterior);Right Forearm <1 day    Peripheral IV 06/02/24 Dorsal (posterior);Right Hand <1 day                      Telemetry:  Telemetry  Orders (From admission, onward)               24 Hour Telemetry Monitoring  Continuous x 24 Hours (Telem)        Question:  Reason for 24 Hour Telemetry  Answer:  PCI/EP study (including pacer and ICD implementation), Cardiac surgery, MI, abnormal cardiac cath, and chest pain- rule out MI                     Telemetry Reviewed: Atrial fibrillation. HR averaging 80  Indication for Continued Telemetry Use: Acute MI/Unstable Angina/Rule out ACS             Imaging: Reviewed radiology reports from this admission including: chest xray    Recent Cultures (last 7 days):         Last 24 Hours Medication List:   Current Facility-Administered Medications   Medication Dose Route Frequency Provider Last Rate    acetaminophen  650 mg Oral Q4H PRN Cresencio Reeder, DO      albuterol  2 puff Inhalation Q6H PRN Cresencio Reeder, DO      ALPRAZolam  0.25 mg Oral BID PRN Cresencio Reeder, DO      amLODIPine  10 mg Oral Daily Cresencio Reeder, DO      aspirin  81 mg Oral Daily Cresencio Reeder, DO      atorvastatin  80 mg Oral Daily With Dinner Ashok Sheppard, DO      diphenhydrAMINE  25 mg Oral HS PRN Tim Griggs PA-C      docusate sodium  100 mg Oral BID JACOB Leahy      heparin (porcine)  3-20 Units/kg/hr (Order-Specific) Intravenous Titrated Ismael Garcia MD 15.8 Units/kg/hr (06/03/24 0127)    heparin (porcine)  2,000 Units Intravenous Q6H PRN Ismael Garcia MD      heparin (porcine)  4,000 Units Intravenous Q6H PRN Ismael Garcia MD      insulin glargine  28 Units Subcutaneous HS Cresencio Reeder, DO      insulin lispro  1-6 Units Subcutaneous 4x Daily (AC & HS) Cresencio Reeder, DO      losartan  100 mg Oral Daily Cresencio Reeder, DO      metoclopramide  10 mg Intravenous Q6H PRN Tim Griggs PA-C      metoprolol tartrate  25 mg Oral Q6H Ismael Garcia MD      morphine injection  2 mg Intravenous Q4H PRN JACOB Leahy      nicotine  1 patch Transdermal Daily Cresencio Reeder, DO      ondansetron  4  mg Intravenous Q4H PRN Cresencio Reeder, DO      oxyCODONE  10 mg Oral Q6H PRN Cresencio Reeder, DO      oxyCODONE  30 mg Oral Q12H PRN Cresencio Reeder, DO      pantoprazole  40 mg Oral Early Morning Cresencio Reeder, DO      PARoxetine  20 mg Oral Daily Cresencio Reeder,       pneumococcal 20-trinity conj vacc  0.5 mL Intramuscular Prior to discharge Cresencio Reeder DO      senna  1 tablet Oral HS JACOB Leahy      sodium chloride (PF)  3 mL Intravenous Q1H PRN Ismael Garcia MD      zolpidem  10 mg Oral HS PRN Cresencio Reeder DO          Today, Patient Was Seen By: JACOB Leahy    **Please Note: This note may have been constructed using a voice recognition system.**

## 2024-06-03 NOTE — ASSESSMENT & PLAN NOTE
Lab Results   Component Value Date    HGBA1C 9.5 (A) 01/04/2024       Recent Labs     06/03/24  1557 06/03/24  2114 06/04/24  0732 06/04/24  1111   POCGLU 351* 93 137 256*       Hyperglycemia upon arrival.  Continue glargine 28 units at HS  Continue SSI  Diabetic diet  Patient with multiple high glucose levels 6/3, on review did not receive breakfast and lunch [facility]  Resume home regimen on discharge and follow up with PCP

## 2024-06-03 NOTE — ASSESSMENT & PLAN NOTE
Chest pain with elevated cardiac enzymes secondary to atrial fibrillation/tachycardia.    T wave inversions lateral leads  Cardiology recommended heparin infusion.  Will keep n.p.o. after midnight in case catheterization is warranted    Lab Results   Component Value Date    HSTNI0 909 (H) 06/02/2024    HSTNI 1,215 (H) 06/02/2024

## 2024-06-03 NOTE — CONSULTS
Cardiology Consultation  MD Wayne Mcgrath MD, [facility]  Ashko Sheppard DO, [facility]  MD Edna Harding DO, [facility]  Usman Mcdonald DO, [facility]  ----------------------------------------------------------------  [address] [address]    Gulshan Arias 63 y.o. male MRN: [id number]  Unit/Bed#: AL CATH LAB ROOM Encounter: [id number]      Reason for Consultation: Elevated troponin/chest pain      ASSESSMENT:   Precordial chest pain  Probable NSTEMI type I, possible type II MI  Paroxysmal atrial fibrillation with RVR on Eliquis  Holter w/ SR avg HR 77 bpm, occasional PACs and PVCs, December 2018  43% burden AF with average HR 98 bpm by [place], March 2024  Hypertension  LVEF 55-60%, mild LVH, normal diastolic function, mild MR, trace TR, September 2016  Dyslipidemia  Type 2 diabetes mellitus  Tobacco use    PLAN:  The patient had presented to [facility] with chest discomfort and rapid atrial fibrillation  Chest discomfort persisted despite rate controlling the AF  Discussed case with interventional cardiology overnight and recommended rate control with medical therapy  However, patient having worsening symptoms this morning, recommend invasive coronary angiography  Risks, benefits and alternatives to cardiac catheterization have been discussed at length including the risk of bleeding, infection, renal failure, CVA, myocardial infarction and death; patient understands these risks and wishes to proceed.  N.p.o. for the procedure  Heparin drip, aspirin, high intensity statin, metoprolol, losartan and amlodipine  Check 2D echocardiogram to assess cardiac structure and function  Further recommendations to follow catheterization    Signed: Ashok Sheppard DO, [facility] LESLIE BELTRÁN      History of Present Illness:  Gulshan Arias is a 63 y.o. male with atrial fibrillation, hypertension dyslipidemia type 2 diabetes mellitus and  tobacco use presented with left-sided chest discomfort.  The discomfort was described as a pressure sensation that radiated into the neck and left upper extremity.  Symptoms progressively worsened and he presented to [facility].  The patient was found to be in rapid atrial fibrillation with progressively rising troponin.  Troponin as of 6/3/2024 was over 16,000.  Patient currently admitting to chest discomfort today.  Denies lower extremity swelling, orthopnea or paroxysmal nocturnal dyspnea.  Denies lightheadedness, dizziness or palpitations.      Review of Systems:  Review of Systems   Constitutional: Negative for decreased appetite, fever, weight gain and weight loss.   HENT:  Negative for congestion and sore throat.    Eyes:  Negative for visual disturbance.   Cardiovascular:  Positive for chest pain. Negative for dyspnea on exertion, leg swelling, near-syncope and palpitations.   Respiratory:  Negative for cough and shortness of breath.    Hematologic/Lymphatic: Negative for bleeding problem.   Skin:  Negative for rash.   Musculoskeletal:  Negative for myalgias and neck pain.   Gastrointestinal:  Negative for abdominal pain and nausea.   Neurological:  Negative for light-headedness and weakness.   Psychiatric/Behavioral:  Negative for depression.          Past Medical History:   Diagnosis Date    Depression     Diabetes mellitus (HCC)     Hyperlipidemia     Hypertension     Tobacco abuse        Past Surgical History:   Procedure Laterality Date    APPENDECTOMY      SHOULDER ARTHROSCOPY         Social History     Socioeconomic History    Marital status: /Civil Union     Spouse name: None    Number of children: None    Years of education: None    Highest education level: None   Occupational History    None   Tobacco Use    Smoking status: Every Day     Current packs/day: 1.00     Average packs/day: 1 pack/day for 1.4 years (1.4 ttl pk-yrs)     Types: Cigarettes     Start date: 2023      Passive exposure: Current    Smokeless tobacco: Current   Vaping Use    Vaping status: Never Used   Substance and Sexual Activity    Alcohol use: No    Drug use: No    Sexual activity: None   Other Topics Concern    None   Social History Narrative    None     Social Determinants of Health     Financial Resource Strain: Low Risk  (1/4/2024)    Overall Financial Resource Strain (CARDIA)     Difficulty of Paying Living Expenses: Not hard at all   Food Insecurity: No Food Insecurity (6/3/2024)    Hunger Vital Sign     Worried About Running Out of Food in the Last Year: Never true     Ran Out of Food in the Last Year: Never true   Transportation Needs: No Transportation Needs (6/3/2024)    PRAPARE - Transportation     Lack of Transportation (Medical): No     Lack of Transportation (Non-Medical): No   Physical Activity: Not on file   Stress: Not on file   Social Connections: Not on file   Intimate Partner Violence: Not on file   Housing Stability: Low Risk  (6/3/2024)    Housing Stability Vital Sign     Unable to Pay for Housing in the Last Year: No     Number of Times Moved in the Last Year: 0     Homeless in the Last Year: No       History reviewed. No pertinent family history.    No Known Allergies    No current facility-administered medications on file prior to encounter.     Current Outpatient Medications on File Prior to Encounter   Medication Sig    ALPRAZolam (XANAX) 0.25 mg tablet TAKE 1 TABLET BY MOUTH TWICE DAILY AS NEEDED FOR ANXIETY    metFORMIN (GLUCOPHAGE) 1000 MG tablet Take 1 tablet (1,000 mg total) by mouth 2 (two) times a day with meals    metoprolol succinate (TOPROL-XL) 25 mg 24 hr tablet Take 1 tablet (25 mg total) by mouth daily    rivaroxaban (Xarelto) 15 mg tablet Take 1 tablet (15 mg total) by mouth daily with breakfast    albuterol (PROVENTIL HFA,VENTOLIN HFA) 90 mcg/act inhaler Inhale 2 puffs every 6 (six) hours as needed for wheezing or shortness of breath    amiodarone 200 mg tablet Take 1  tablet (200 mg total) by mouth daily Starting 3/23/24    amLODIPine (NORVASC) 10 mg tablet Take 1 tablet (10 mg total) by mouth daily    dulaglutide (Trulicity) 0.75 MG/0.5ML injection Inject 0.5 mL (0.75 mg total) under the skin every 7 days    fluticasone (Flovent HFA) 44 mcg/act inhaler Inhale 2 puffs 2 (two) times a day Rinse mouth after use. (Patient not taking: Reported on 2/15/2024)    glucose blood test strip 1 each by Other route 2 (two) times a day Use as instructed    Insulin Glargine Solostar (Lantus SoloStar) 100 UNIT/ML SOPN Inject 0.28 mL (28 Units total) under the skin daily at bedtime    Insulin Pen Needle (TechLite Pen Needles) 31G X 8 MM MISC Apply topically 4 (four) times a day    Lancets Misc. (Accu-Chek FastClix Lancet) KIT Inject under the skin 2 (two) times a day    losartan-hydrochlorothiazide (HYZAAR) 100-25 MG per tablet Take 1 tablet by mouth daily    oxyCODONE (ROXICODONE) 30 MG immediate release tablet     pantoprazole (PROTONIX) 40 mg tablet Take 1 tablet (40 mg total) by mouth daily    PARoxetine (PAXIL) 20 mg tablet Take 1 tablet (20 mg total) by mouth daily    polyethylene glycol (MIRALAX) 17 g packet Take 17 g by mouth daily for 5 days    simvastatin (ZOCOR) 40 mg tablet Take 1 tablet (40 mg total) by mouth daily at bedtime    zolpidem (AMBIEN) 10 mg tablet Take 10 mg by mouth daily at bedtime as needed for sleep        Current Facility-Administered Medications   Medication Dose Route Frequency Provider Last Rate    [Transfer Hold] acetaminophen  650 mg Oral Q4H PRN Cresencio Reeder DO      albuterol  2 puff Inhalation Q6H PRN Cresencio Reeder DO      ALPRAZolam  0.25 mg Oral BID PRN Cresencio Reeder DO      amLODIPine  10 mg Oral Daily Cresencio Reeder DO      [Transfer Hold] aspirin  81 mg Oral Daily Cresencio Reeder DO      [Transfer Hold] atorvastatin  80 mg Oral Daily With Dinner Ashok Sheppard DO      [Transfer Hold] diphenhydrAMINE  25 mg Oral HS PRN Tim Griggs PA-C       [Transfer Hold] docusate sodium  100 mg Oral BID JACOB Leahy      fentaNYL   Intravenous Code/Trauma/Sedation Med Scott Crawford MD      heparin (porcine)  3-20 Units/kg/hr (Order-Specific) Intravenous Titrated Ismael Garcia MD Stopped (06/03/24 1045)    heparin (porcine)  2,000 Units Intravenous Q6H PRN Ismael Garcia MD      heparin (porcine)  4,000 Units Intravenous Q6H PRN Ismael Garcia MD      insulin glargine  28 Units Subcutaneous HS Cresencio Reeder, DO      [Transfer Hold] insulin lispro  1-6 Units Subcutaneous 4x Daily (AC & HS) Cresencio Reeder, DO      losartan  100 mg Oral Daily Cresencio Reeder DO      [Transfer Hold] metoclopramide  10 mg Intravenous Q6H PRN Tim Griggs PA-C      metoprolol tartrate  25 mg Oral Q6H Ismael Garcia MD      midazolam   Intravenous Code/Trauma/Sedation Med Scott Crawford MD      [Transfer Hold] morphine injection  2 mg Intravenous Q4H PRN JACOB Leahy      [Transfer Hold] nicotine  1 patch Transdermal Daily Cresencio Reeder DO      [Transfer Hold] ondansetron  4 mg Intravenous Q4H PRN Cresencio Reeder DO      [Transfer Hold] oxyCODONE  10 mg Oral Q6H PRN Cresencio Reeder, DO      oxyCODONE  30 mg Oral Q12H PRN Cresencio Reeder DO      pantoprazole  40 mg Oral Early Morning Cresencio Reeder DO      PARoxetine  20 mg Oral Daily Cresencio Reeder DO      [Transfer Hold] pneumococcal 20-trinity conj vacc  0.5 mL Intramuscular Prior to discharge Cresencio Reeder DO      [Transfer Hold] senna  1 tablet Oral HS JACOB Leahy      sodium chloride (PF)  3 mL Intravenous Q1H PRN Ismael Garcia MD      zolpidem  10 mg Oral HS PRN Cresencio Reeder DO         heparin (porcine), 3-20 Units/kg/hr (Order-Specific), Last Rate: Stopped (06/03/24 1045)        Vitals:    06/02/24 2358 06/03/24 0226 06/03/24 0614 06/03/24 0721   BP: 143/87 142/87 155/86 151/92   BP Location: Left arm Left arm  Left arm   Pulse: 76 72 85 73   Resp: 18 18  18   Temp: 97.9 °F  "(36.6 °C) 97.7 °F (36.5 °C)  97.8 °F (36.6 °C)   TempSrc: Temporal Temporal  Temporal   SpO2: 94% 93%  92%   Weight:           I/O last 3 completed shifts:  In: 1480 [P.O.:480; IV Piggyback:1000]  Out: -       Intake/Output Summary (Last 24 hours) at 6/3/2024 1053  Last data filed at 6/2/2024 2144  Gross per 24 hour   Intake 1480 ml   Output --   Net 1480 ml       Weight change:     PHYSICAL EXAMINATION:  Gen: Awake, Alert, NAD  Head/eyes: AT/NC, pupils equal and round, Anicteric  ENT: mmm  Neck: Supple, No elevated JVP, trachea midline  Resp: CTA bilaterally no w/r/r  CV: RRR +S1, S2, No m/r/g  Abd: Soft, NT/ND + BS  Ext: no LE edema bilaterally  Neuro: Follows commands, moves all extermities  Psych: Appropriate affect, normal mood, pleasant attitude, non-combative  Skin: warm; no rash, erythema or venous stasis changes on exposed skin    Lab Results:  Results from last 7 days   Lab Units 06/03/24  0621   WBC Thousand/uL 13.38*   HEMOGLOBIN g/dL 15.1   HEMATOCRIT % 44.3   PLATELETS Thousands/uL 238     Results from last 7 days   Lab Units 06/03/24  0621   POTASSIUM mmol/L 3.5   CHLORIDE mmol/L 96   CO2 mmol/L 28   BUN mg/dL 14   CREATININE mg/dL 1.07   CALCIUM mg/dL 9.0   ALK PHOS U/L 60   ALT U/L 25   AST U/L 83*     No results found for: \"TROPONINT\"      Results from last 7 days   Lab Units 06/03/24  0621 06/02/24  2154 06/02/24  1941 06/02/24  1838 06/02/24  1747   HS TNI 0HR ng/L  --   --   --   --  909*   HS TNI 2HR ng/L  --   --  3,132*  --   --    HS TNI 4HR ng/L  --  10,773*  --   --   --    HS TNI RAND ng/L 16,218*  --   --  1,215*  --          Results from last 7 days   Lab Units 06/02/24  1747   INR  0.91           Results for orders placed during the hospital encounter of 09/28/16    Echo complete with contrast if indicated    Narrative  [facility]  [address] [place] [address].  [place] [address] [phone]    Transthoracic Echocardiogram  2D, M-mode, Doppler, and Color " Doppler    Study date:  29-Sep-2016    Patient: GAURI HOROWITZ  MR number: [id number]  Account number: [id number]  : 1960  Age: 55 years  Gender: Male  Status: Inpatient  Location: Bedside  Height: 64 in  Weight: 187 lb  BP: 180/ 96 mmHg    Indications: Atrial fibrillation    Diagnoses: I48.0 - Atrial fibrillation    Sonographer:  MOSHE Colon  Referring Physician:  Tariq PA-C  Group:  [facility]  Interpreting Physician:  DO CAROL More    LEFT VENTRICLE:  Systolic function was normal. Ejection fraction was estimated in the range of  55 % to 60 %.  There were no regional wall motion abnormalities.  Wall thickness was mildly increased.  There was mild concentric hypertrophy.    MITRAL VALVE:  There was mild regurgitation.    AORTIC VALVE:  There was trace regurgitation.    HISTORY: PRIOR HISTORY: Hypertension, hyperlipidemia, DM, tobacco abuse    PROCEDURE: The procedure was performed at the bedside. This was a routine  study. The transthoracic approach was used. The study included complete 2D  imaging, M-mode, complete spectral Doppler, and color Doppler. The heart rate  was 76 bpm, at the start of the study. Images were obtained from the  parasternal, apical, subcostal, and suprasternal notch acoustic windows. Image  quality was adequate.    LEFT VENTRICLE: Size was normal. Systolic function was normal. Ejection  fraction was estimated in the range of 55 % to 60 %. There were no regional  wall motion abnormalities. Wall thickness was mildly increased. There was mild  concentric hypertrophy. DOPPLER: Left ventricular diastolic function parameters  were normal.    RIGHT VENTRICLE: The size was normal. Systolic function was normal. Wall  thickness was normal.    LEFT ATRIUM: Size was normal.    RIGHT ATRIUM: Size was normal.    MITRAL VALVE: Valve structure was normal. There was normal leaflet separation.  DOPPLER: The transmitral velocity was within  the normal range. There was no  evidence for stenosis. There was mild regurgitation.    AORTIC VALVE: The valve was trileaflet. Leaflets exhibited normal thickness and  normal cuspal separation. DOPPLER: Transaortic velocity was within the normal  range. There was no evidence for stenosis. There was trace regurgitation.    TRICUSPID VALVE: The valve structure was normal. There was normal leaflet  separation. DOPPLER: The transtricuspid velocity was within the normal range.  There was no evidence for stenosis. There was no regurgitation.    PULMONIC VALVE: Leaflets exhibited normal thickness, no calcification, and  normal cuspal separation. DOPPLER: The transpulmonic velocity was within the  normal range. There was no regurgitation.    PERICARDIUM: There was no pericardial effusion.    AORTA: The root exhibited normal size.    SYSTEMIC VEINS: IVC: The inferior vena cava was normal in size and course.  Respirophasic changes were normal.    PULMONARY ARTERY: DOPPLER: The tricuspid jet envelope definition was inadequate  for estimation of RV systolic pressure.    SYSTEM MEASUREMENT TABLES    2D  %FS: 32 %  Ao Diam: 3.5 cm  EDV(Teich): 99.5 ml  EF Biplane: 44.9 %  EF(Teich): 60.2 %  ESV(Teich): 39.6 ml  IVSd: 1 cm  LA Area: 19.1 cm2  LA Diam: 3.2 cm  LVEDV MOD A2C: 111.6 ml  LVEDV MOD A4C: 135.4 ml  LVEDV MOD BP: 129.3 ml  LVEF MOD A2C: 39 %  LVEF MOD A4C: 47.2 %  LVESV MOD A2C: 68 ml  LVESV MOD A4C: 71.5 ml  LVESV MOD BP: 71.3 ml  LVIDd: 4.6 cm  LVIDs: 3.2 cm  LVLd A2C: 8.4 cm  LVLd A4C: 9.4 cm  LVLs A2C: 7.7 cm  LVLs A4C: 7.3 cm  LVPWd: 1 cm  RA Area: 11.2 cm2  RVIDd: 3 cm  SV MOD A2C: 43.5 ml  SV MOD A4C: 63.9 ml  SV(Teich): 59.9 ml    CW  AR Dec [place]: 2.3 m/s2  AR Dec Time: 1633.1 ms  AR PHT: 473.6 ms  AR Vmax: 3.8 m/s  AR maxP.5 mmHg    MM  TAPSE: 2.2 cm    PW  E': 0.1 m/s  E/E': 10.2  MV A Francois: 0.7 m/s  MV Dec [place]: 5.1 m/s2  MV DecT: 160.6 ms  MV E Francois: 0.8 m/s  MV E/A Ratio: 1.3  MV PHT: 46.6 ms  MVA By  PHT: 4.7 cm2    [facility]    Prepared and electronically signed by    Edna Ac DO  Signed 29-Sep-2016 15:25:44    No results found for this or any previous visit.    No results found for this or any previous visit.    Results for orders placed during the hospital encounter of 02/21/24    NM myocardial perfusion spect (rx stress and/or rest)    Interpretation Summary    Stress ECG: No ST deviation is noted. The ECG was not diagnostic due to pharmacological (vasodilator) stress.    Stress Function: Left ventricular function post-stress is normal. Stress ejection fraction is 52%.    Perfusion: There are no perfusion defects.      CXR: Results for orders placed during the hospital encounter of 02/06/22    XR chest 2 views    Narrative  CHEST    INDICATION:   Cough.    COMPARISON:  4/25/2019    EXAM PERFORMED/VIEWS:  XR CHEST PA & LATERAL  Images: 4    FINDINGS:    Cardiomediastinal silhouette appears unremarkable.    The lungs are clear.  No pneumothorax or pleural effusion.    Osseous structures appear within normal limits for patient age.    Impression  No acute cardiopulmonary disease.    Findings are stable        Workstation performed: [id number]    Results for orders placed during the hospital encounter of 06/02/24    X-ray chest 1 view portable    Narrative  XR CHEST PORTABLE    INDICATION: chest pain. Sharp left chest pain.    COMPARISON: CXR 2/6/2022, abdomen CT 3/28/2024.    FINDINGS:    Clear lungs. No pneumothorax or pleural effusion.    Normal cardiomediastinal silhouette.    Bones are unremarkable for age.    Normal upper abdomen.    Impression  No acute cardiopulmonary disease.        Workstation performed: [id number]      ECG: AF with RVR and inferolateral T wave abnormalities    This note was completed in part utilizing [employer] Software.  Grammatical errors, random word insertions, spelling mistakes, and incomplete sentences may  be an occasional consequence of this system secondary to software limitations, ambient noise, and hardware issues.  If you have any questions or concerns about the content, text, or information contained within the body of this dictation, please contact the provider for clarification.

## 2024-06-03 NOTE — ASSESSMENT & PLAN NOTE
Patient admitted on Sunday 6/2 with severe chest pain.  Chest pain initially started Friday night and worsened through the weekend until presentation to ED  Chest pain with elevated cardiac enzymes secondary to atrial fibrillation/tachycardia.    6/2 CXR: No acute cardiopulmonary disease  EKG: T wave inversions lateral leads  Continue Heparin drip  PRN IV Morphine ordered, bowel regimen initiated  Continued severe chest pain, repeat EKG this am appears unchanged; cardiology notified  6/3 Repeat random trop 16,200  6/3 Cardiac catheterization found 99% occlusion of mid OM2 successfully treated with placement of 2 overlapping ASHVIN's  Repeat EKG s/p cath was NSR with continued ST and T wave abnormality  Ok for discharge per cardiology on ASA, Brilinta, and Xarelto    Lab Results   Component Value Date    HSTNI0 909 (H) 06/02/2024    HSTNI2 3,132 (H) 06/02/2024    HSTNI4 10,773 (H) 06/02/2024    HSTNI 1,215 (H) 06/02/2024

## 2024-06-03 NOTE — PLAN OF CARE
Problem: Potential for Falls  Goal: Patient will remain free of falls  Description: INTERVENTIONS:  - Educate patient/family on patient safety including physical limitations  - Instruct patient to call for assistance with activity   - Consult OT/PT to assist with strengthening/mobility   - Keep Call bell within reach  - Keep bed low and locked with side rails adjusted as appropriate  - Keep care items and personal belongings within reach  - Initiate and maintain comfort rounds  - Make Fall Risk Sign visible to staff  - Offer Toileting every 2 Hours, in advance of need  - Initiate/Maintain bed alarm  - Obtain necessary fall risk management equipment:   - Apply yellow socks and bracelet for high fall risk patients  - Consider moving patient to room near nurses station  Outcome: Progressing     Problem: INFECTION - ADULT  Goal: Absence or prevention of progression during hospitalization  Description: INTERVENTIONS:  - Assess and monitor for signs and symptoms of infection  - Monitor lab/diagnostic results  - Monitor all insertion sites, i.e. indwelling lines, tubes, and drains  - Monitor endotracheal if appropriate and nasal secretions for changes in amount and color  - [place] appropriate cooling/warming therapies per order  - Administer medications as ordered  - Instruct and encourage patient and family to use good hand hygiene technique  - Identify and instruct in appropriate isolation precautions for identified infection/condition  Outcome: Progressing  Goal: Absence of fever/infection during neutropenic period  Description: INTERVENTIONS:  - Monitor WBC    Outcome: Progressing     Problem: SAFETY ADULT  Goal: Patient will remain free of falls  Description: INTERVENTIONS:  - Educate patient/family on patient safety including physical limitations  - Instruct patient to call for assistance with activity   - Consult OT/PT to assist with strengthening/mobility   - Keep Call bell within reach  - Keep bed low and  locked with side rails adjusted as appropriate  - Keep care items and personal belongings within reach  - Initiate and maintain comfort rounds  - Make Fall Risk Sign visible to staff  - Offer Toileting every 2 Hours, in advance of need  - Initiate/Maintain bed alarm  - Obtain necessary fall risk management equipment:   - Apply yellow socks and bracelet for high fall risk patients  - Consider moving patient to room near nurses station  Outcome: Progressing  Goal: Maintain or return to baseline ADL function  Description: INTERVENTIONS:  -  Assess patient's ability to carry out ADLs; assess patient's baseline for ADL function and identify physical deficits which impact ability to perform ADLs (bathing, care of mouth/teeth, toileting, grooming, dressing, etc.)  - Assess/evaluate cause of self-care deficits   - Assess range of motion  - Assess patient's mobility; develop plan if impaired  - Assess patient's need for assistive devices and provide as appropriate  - Encourage maximum independence but intervene and supervise when necessary  - Involve family in performance of ADLs  - Assess for home care needs following discharge   - Consider OT consult to assist with ADL evaluation and planning for discharge  - Provide patient education as appropriate  Outcome: Progressing  Goal: Maintains/Returns to pre admission functional level  Description: INTERVENTIONS:  - Perform AM-PAC 6 Click Basic Mobility/ Daily Activity assessment daily.  - Set and communicate daily mobility goal to care team and patient/family/caregiver.   - Collaborate with rehabilitation services on mobility goals if consulted  - Perform Range of Motion 3 times a day.  - Reposition patient every 2 hours.  - Dangle patient 3 times a day  - Stand patient 3 times a day  - Ambulate patient 3 times a day  - Out of bed to chair 3 times a day   - Out of bed for meals 3 times a day  - Out of bed for toileting  - Record patient progress and toleration of activity level    Outcome: Progressing     Problem: DISCHARGE PLANNING  Goal: Discharge to home or other facility with appropriate resources  Description: INTERVENTIONS:  - Identify barriers to discharge w/patient and caregiver  - Arrange for needed discharge resources and transportation as appropriate  - Identify discharge learning needs (meds, wound care, etc.)  - Arrange for interpretive services to assist at discharge as needed  - Refer to Case Management Department for coordinating discharge planning if the patient needs post-hospital services based on physician/advanced practitioner order or complex needs related to functional status, cognitive ability, or social support system  Outcome: Progressing     Problem: Knowledge Deficit  Goal: Patient/family/caregiver demonstrates understanding of disease process, treatment plan, medications, and discharge instructions  Description: Complete learning assessment and assess knowledge base.  Interventions:  - Provide teaching at level of understanding  - Provide teaching via preferred learning methods  Outcome: Progressing     Problem: CARDIOVASCULAR - ADULT  Goal: Maintains optimal cardiac output and hemodynamic stability  Description: INTERVENTIONS:  - Monitor I/O, vital signs and rhythm  - Monitor for S/S and trends of decreased cardiac output  - Administer and titrate ordered vasoactive medications to optimize hemodynamic stability  - Assess quality of pulses, skin color and temperature  - Assess for signs of decreased coronary artery perfusion  - Instruct patient to report change in severity of symptoms  Outcome: Progressing

## 2024-06-03 NOTE — PROGRESS NOTES
Cardiac cath performed via the R radial artery.    99% occlusion of the mid OM2.    Successful PCI with placement of 2 overlapping 2.5 mm ASHVIN's .    He was loaded with Brilinta.    He can be dischaged on ASA 81 mg daily, Plaxix 75 mg daily, Xarelto.    Stop ASA after 2 weeks.

## 2024-06-03 NOTE — CASE MANAGEMENT
Case Management Assessment & Discharge Planning Note    Patient name Gulshan Arias  [facility] -* MRN [id number]  : 1960 Date 6/3/2024       Current Admission Date: 2024  Current Admission Diagnosis:Rapid atrial fibrillation (HCC)   Patient Active Problem List    Diagnosis Date Noted Date Diagnosed    NSTEMI (non-ST elevated myocardial infarction) (HCC) 2024     Hyponatremia 2024     Financial difficulties 2024     Difficulty urinating 2021     Generalized anxiety disorder 2018     Gastroesophageal reflux disease without esophagitis 10/11/2018     Primary insomnia 10/08/2018     Chronic left shoulder pain 10/08/2018     Chronic midline low back pain without sciatica 10/08/2018     Snoring 10/08/2018     Chronic nausea 2016     Rapid atrial fibrillation (HCC) 2016     Essential hypertension      Hyperlipidemia      Type 2 diabetes mellitus without complication, with long-term current use of insulin (HCC)      Tobacco dependence        LOS (days): 1  Geometric Mean LOS (GMLOS) (days):   Days to GMLOS:     OBJECTIVE:    Risk of Unplanned Readmission Score: 15.24         Current admission status: Inpatient       Preferred Pharmacy:   [facility] [id number] [facility]  [id number] [facility]   Phone: [phone] Fax: [phone]    Primary Care Provider: Osei Cleaning MD    Primary Insurance: MEDICARE  Secondary Insurance:     ASSESSMENT:  Active Health Care Proxies       Jackie Mejia Health Care Representative - Spouse   Primary Phone: [phone] (Mobile)                 Advance Directives  Does patient have a Health Care POA?: No  Was patient offered paperwork?: Yes (Pt declined)  Does patient currently have a Health Care decision maker?: Yes, please see Health Care Proxy section  Does patient have Advance Directives?: No  Was patient offered paperwork?: Yes (Pt  declined)         Readmission Root Cause  30 Day Readmission: No    Patient Information  Admitted from:: Home  Mental Status: Alert  During Assessment patient was accompanied by: Not accompanied during assessment  Assessment information provided by:: Patient  Primary Caregiver: Self  Support Systems: Spouse/significant other, Family members  County of Residence: [place]  What city do you live in?: [place]  Home entry access options. Select all that apply.: Stairs  Number of steps to enter home.: 4  Do the steps have railings?: No  Type of Current Residence: 2 story home  Upon entering residence, is there a bedroom on the main floor (no further steps)?: No  A bedroom is located on the following floor levels of residence (select all that apply):: 2nd Floor  Upon entering residence, is there a bathroom on the main floor (no further steps)?: No  Indicate which floors of current residence have a bathroom (select all the apply):: 2nd Floor  Number of steps to 2nd floor from main floor: One Flight  Living Arrangements: Lives w/ Spouse/significant other  Is patient a ?: No    Activities of Daily Living Prior to Admission  Functional Status: Independent  Completes ADLs independently?: Yes  Ambulates independently?: Yes  Does patient use assisted devices?: Yes  Assisted Devices (DME) used: Straight Cane  Does patient currently own DME?: Yes  What DME does the patient currently own?: Straight Cane  Does patient have a history of Outpatient Therapy (PT/OT)?: No  Does the patient have a history of Short-Term Rehab?: No  Does patient have a history of HHC?: No  Does patient currently have HHC?: No         Patient Information Continued  Income Source: Pension/FDC  Does patient have prescription coverage?: Yes  Does patient receive dialysis treatments?: No  Does patient have a history of substance abuse?: No  Does patient have a history of Mental Health Diagnosis?: Yes (Anxiety)  Is patient receiving treatment for  mental health?: No. Patient declined treatment information.  Has patient received inpatient treatment related to mental health in the last 2 years?: No         Means of Transportation  Means of Transport to Appts:: Drives Self      Social Determinants of Health (SDOH)      Flowsheet Row Most Recent Value   Housing Stability    In the last 12 months, was there a time when you were not able to pay the mortgage or rent on time? N   In the past 12 months, how many times have you moved where you were living? 0   At any time in the past 12 months, were you homeless or living in a shelter (including now)? N   Transportation Needs    In the past 12 months, has lack of transportation kept you from medical appointments or from getting medications? no   In the past 12 months, has lack of transportation kept you from meetings, work, or from getting things needed for daily living? No   Food Insecurity    Within the past 12 months, you worried that your food would run out before you got the money to buy more. Never true   Within the past 12 months, the food you bought just didn't last and you didn't have money to get more. Never true   Utilities    In the past 12 months has the electric, gas, oil, or water company threatened to shut off services in your home? No            DISCHARGE DETAILS:    Discharge planning discussed with:: Patient  Freedom of Choice: Yes     CM contacted family/caregiver?: No- see comments (Patient declined, has been in communication with his wife)  Were Treatment Team discharge recommendations reviewed with patient/caregiver?: Yes  Did patient/caregiver verbalize understanding of patient care needs?: Yes  Were patient/caregiver advised of the risks associated with not following Treatment Team discharge recommendations?: Yes         Requested Home Health Care         Is the patient interested in HHC at discharge?: No    DME Referral Provided  Referral made for DME?: No         Would you like to participate  in our [facility] service program?  : No - Declined    Treatment Team Recommendation: Home  Discharge Destination Plan:: Home  Transport at Discharge : Family           CM met with patient at bedside to introduce self and role with discharge planning. CM utilized   [id number] during assessment. Patient reports living with his wife in a two story home with 4 steps to enter.   Patient reports being fully independent PTA, he drives. Patient utilizes a SPC at home.   Patient does not identify any CM needs at this time, CM department remains available.

## 2024-06-03 NOTE — PLAN OF CARE
Problem: Potential for Falls  Goal: Patient will remain free of falls  Description: INTERVENTIONS:  - Educate patient/family on patient safety including physical limitations  - Instruct patient to call for assistance with activity   - Consult OT/PT to assist with strengthening/mobility   - Keep Call bell within reach  - Keep bed low and locked with side rails adjusted as appropriate  - Keep care items and personal belongings within reach  - Initiate and maintain comfort rounds  - Make Fall Risk Sign visible to staff  - Offer Toileting every  Hours, in advance of need  - Initiate/Maintain alarm  - Obtain necessary fall risk management equipment:   - Apply yellow socks and bracelet for high fall risk patients  - Consider moving patient to room near nurses station  Outcome: Progressing     Problem: PAIN - ADULT  Goal: Verbalizes/displays adequate comfort level or baseline comfort level  Description: Interventions:  - Encourage patient to monitor pain and request assistance  - Assess pain using appropriate pain scale  - Administer analgesics based on type and severity of pain and evaluate response  - Implement non-pharmacological measures as appropriate and evaluate response  - Consider cultural and social influences on pain and pain management  - Notify physician/advanced practitioner if interventions unsuccessful or patient reports new pain  Outcome: Progressing     Problem: INFECTION - ADULT  Goal: Absence or prevention of progression during hospitalization  Description: INTERVENTIONS:  - Assess and monitor for signs and symptoms of infection  - Monitor lab/diagnostic results  - Monitor all insertion sites, i.e. indwelling lines, tubes, and drains  - Monitor endotracheal if appropriate and nasal secretions for changes in amount and color  - [place] appropriate cooling/warming therapies per order  - Administer medications as ordered  - Instruct and encourage patient and family to use good hand hygiene technique  -  Identify and instruct in appropriate isolation precautions for identified infection/condition  Outcome: Progressing  Goal: Absence of fever/infection during neutropenic period  Description: INTERVENTIONS:  - Monitor WBC    Outcome: Progressing     Problem: SAFETY ADULT  Goal: Patient will remain free of falls  Description: INTERVENTIONS:  - Educate patient/family on patient safety including physical limitations  - Instruct patient to call for assistance with activity   - Consult OT/PT to assist with strengthening/mobility   - Keep Call bell within reach  - Keep bed low and locked with side rails adjusted as appropriate  - Keep care items and personal belongings within reach  - Initiate and maintain comfort rounds  - Make Fall Risk Sign visible to staff  - Offer Toileting every  Hours, in advance of need  - Initiate/Maintain alarm  - Obtain necessary fall risk management equipment:   - Apply yellow socks and bracelet for high fall risk patients  - Consider moving patient to room near nurses station  Outcome: Progressing  Goal: Maintain or return to baseline ADL function  Description: INTERVENTIONS:  -  Assess patient's ability to carry out ADLs; assess patient's baseline for ADL function and identify physical deficits which impact ability to perform ADLs (bathing, care of mouth/teeth, toileting, grooming, dressing, etc.)  - Assess/evaluate cause of self-care deficits   - Assess range of motion  - Assess patient's mobility; develop plan if impaired  - Assess patient's need for assistive devices and provide as appropriate  - Encourage maximum independence but intervene and supervise when necessary  - Involve family in performance of ADLs  - Assess for home care needs following discharge   - Consider OT consult to assist with ADL evaluation and planning for discharge  - Provide patient education as appropriate  Outcome: Progressing  Goal: Maintains/Returns to pre admission functional level  Description:  INTERVENTIONS:  - Perform AM-PAC 6 Click Basic Mobility/ Daily Activity assessment daily.  - Set and communicate daily mobility goal to care team and patient/family/caregiver.   - Collaborate with rehabilitation services on mobility goals if consulted  - Perform Range of Motion  times a day.  - Reposition patient every  hours.  - Dangle patient  times a day  - Stand patient  times a day  - Ambulate patient  times a day  - Out of bed to chair  times a day   - Out of bed for meals times a day  - Out of bed for toileting  - Record patient progress and toleration of activity level   Outcome: Progressing     Problem: DISCHARGE PLANNING  Goal: Discharge to home or other facility with appropriate resources  Description: INTERVENTIONS:  - Identify barriers to discharge w/patient and caregiver  - Arrange for needed discharge resources and transportation as appropriate  - Identify discharge learning needs (meds, wound care, etc.)  - Arrange for interpretive services to assist at discharge as needed  - Refer to Case Management Department for coordinating discharge planning if the patient needs post-hospital services based on physician/advanced practitioner order or complex needs related to functional status, cognitive ability, or social support system  Outcome: Progressing     Problem: Knowledge Deficit  Goal: Patient/family/caregiver demonstrates understanding of disease process, treatment plan, medications, and discharge instructions  Description: Complete learning assessment and assess knowledge base.  Interventions:  - Provide teaching at level of understanding  - Provide teaching via preferred learning methods  Outcome: Progressing     Problem: CARDIOVASCULAR - ADULT  Goal: Maintains optimal cardiac output and hemodynamic stability  Description: INTERVENTIONS:  - Monitor I/O, vital signs and rhythm  - Monitor for S/S and trends of decreased cardiac output  - Administer and titrate ordered vasoactive medications to  optimize hemodynamic stability  - Assess quality of pulses, skin color and temperature  - Assess for signs of decreased coronary artery perfusion  - Instruct patient to report change in severity of symptoms  Outcome: Progressing

## 2024-06-04 ENCOUNTER — APPOINTMENT (INPATIENT)
Dept: NON INVASIVE DIAGNOSTICS | Facility: HOSPITAL | Age: 64
DRG: 322 | End: 2024-06-04
Payer: MEDICARE

## 2024-06-04 VITALS
HEART RATE: 79 BPM | BODY MASS INDEX: 32.44 KG/M2 | TEMPERATURE: 98.2 F | OXYGEN SATURATION: 96 % | SYSTOLIC BLOOD PRESSURE: 141 MMHG | DIASTOLIC BLOOD PRESSURE: 84 MMHG | HEIGHT: 64 IN | WEIGHT: 190 LBS | RESPIRATION RATE: 16 BRPM

## 2024-06-04 LAB
ANION GAP SERPL CALCULATED.3IONS-SCNC: 5 MMOL/L (ref 4–13)
APICAL FOUR CHAMBER EJECTION FRACTION: 50 %
ASCENDING AORTA: 3.1 CM
BSA FOR ECHO PROCEDURE: 1.91 M2
BUN SERPL-MCNC: 12 MG/DL (ref 5–25)
CALCIUM SERPL-MCNC: 8.9 MG/DL (ref 8.4–10.2)
CHLORIDE SERPL-SCNC: 101 MMOL/L (ref 96–108)
CO2 SERPL-SCNC: 28 MMOL/L (ref 21–32)
CREAT SERPL-MCNC: 1 MG/DL (ref 0.6–1.3)
E WAVE DECELERATION TIME: 170 MS
E/A RATIO: 3.29
ERYTHROCYTE [DISTWIDTH] IN BLOOD BY AUTOMATED COUNT: 12.9 % (ref 11.6–15.1)
FRACTIONAL SHORTENING: 27 (ref 28–44)
GFR SERPL CREATININE-BSD FRML MDRD: 80 ML/MIN/1.73SQ M
GLUCOSE SERPL-MCNC: 123 MG/DL (ref 65–140)
GLUCOSE SERPL-MCNC: 137 MG/DL (ref 65–140)
GLUCOSE SERPL-MCNC: 256 MG/DL (ref 65–140)
HCT VFR BLD AUTO: 39.3 % (ref 36.5–49.3)
HGB BLD-MCNC: 13.2 G/DL (ref 12–17)
INTERVENTRICULAR SEPTUM IN DIASTOLE (PARASTERNAL SHORT AXIS VIEW): 1.3 CM
INTERVENTRICULAR SEPTUM: 1.3 CM (ref 0.6–1.1)
LEFT INTERNAL DIMENSION IN SYSTOLE: 3.3 CM (ref 2.1–4)
LEFT VENTRICULAR INTERNAL DIMENSION IN DIASTOLE: 4.5 CM (ref 3.5–6)
LEFT VENTRICULAR POSTERIOR WALL IN END DIASTOLE: 1.1 CM
LEFT VENTRICULAR STROKE VOLUME: 49 ML
LVSV (TEICH): 49 ML
MCH RBC QN AUTO: 30.5 PG (ref 26.8–34.3)
MCHC RBC AUTO-ENTMCNC: 33.6 G/DL (ref 31.4–37.4)
MCV RBC AUTO: 91 FL (ref 82–98)
MV E'TISSUE VEL-LAT: 7 CM/S
MV E'TISSUE VEL-SEP: 7 CM/S
MV PEAK A VEL: 0.35 M/S
MV PEAK E VEL: 115 CM/S
MV STENOSIS PRESSURE HALF TIME: 49 MS
MV VALVE AREA P 1/2 METHOD: 4.49
PLATELET # BLD AUTO: 208 THOUSANDS/UL (ref 149–390)
PMV BLD AUTO: 10.7 FL (ref 8.9–12.7)
POTASSIUM SERPL-SCNC: 3.6 MMOL/L (ref 3.5–5.3)
RBC # BLD AUTO: 4.33 MILLION/UL (ref 3.88–5.62)
SL CV LV EF: 45
SL CV PED ECHO LEFT VENTRICLE DIASTOLIC VOLUME (MOD BIPLANE) 2D: 94 ML
SL CV PED ECHO LEFT VENTRICLE SYSTOLIC VOLUME (MOD BIPLANE) 2D: 46 ML
SODIUM SERPL-SCNC: 134 MMOL/L (ref 135–147)
TRICUSPID ANNULAR PLANE SYSTOLIC EXCURSION: 2.5 CM
WBC # BLD AUTO: 11.85 THOUSAND/UL (ref 4.31–10.16)

## 2024-06-04 PROCEDURE — 93325 DOPPLER ECHO COLOR FLOW MAPG: CPT

## 2024-06-04 PROCEDURE — 93325 DOPPLER ECHO COLOR FLOW MAPG: CPT | Performed by: INTERNAL MEDICINE

## 2024-06-04 PROCEDURE — 99232 SBSQ HOSP IP/OBS MODERATE 35: CPT | Performed by: INTERNAL MEDICINE

## 2024-06-04 PROCEDURE — 80048 BASIC METABOLIC PNL TOTAL CA: CPT

## 2024-06-04 PROCEDURE — 99239 HOSP IP/OBS DSCHRG MGMT >30: CPT

## 2024-06-04 PROCEDURE — 93308 TTE F-UP OR LMTD: CPT

## 2024-06-04 PROCEDURE — 93321 DOPPLER ECHO F-UP/LMTD STD: CPT | Performed by: INTERNAL MEDICINE

## 2024-06-04 PROCEDURE — 82948 REAGENT STRIP/BLOOD GLUCOSE: CPT

## 2024-06-04 PROCEDURE — 93308 TTE F-UP OR LMTD: CPT | Performed by: INTERNAL MEDICINE

## 2024-06-04 PROCEDURE — 85027 COMPLETE CBC AUTOMATED: CPT

## 2024-06-04 PROCEDURE — 93321 DOPPLER ECHO F-UP/LMTD STD: CPT

## 2024-06-04 RX ORDER — POLYETHYLENE GLYCOL 3350 17 G/17G
17 POWDER, FOR SOLUTION ORAL DAILY PRN
Status: DISCONTINUED | OUTPATIENT
Start: 2024-06-04 | End: 2024-06-04 | Stop reason: HOSPADM

## 2024-06-04 RX ORDER — AMIODARONE HYDROCHLORIDE 200 MG/1
400 TABLET ORAL
Status: DISCONTINUED | OUTPATIENT
Start: 2024-06-04 | End: 2024-06-04 | Stop reason: HOSPADM

## 2024-06-04 RX ORDER — METOPROLOL SUCCINATE 50 MG/1
50 TABLET, EXTENDED RELEASE ORAL DAILY
Qty: 30 TABLET | Refills: 0 | Status: SHIPPED | OUTPATIENT
Start: 2024-06-04 | End: 2024-06-17 | Stop reason: SDUPTHER

## 2024-06-04 RX ORDER — NICOTINE 21 MG/24HR
1 PATCH, TRANSDERMAL 24 HOURS TRANSDERMAL DAILY
Qty: 28 PATCH | Refills: 0 | Status: SHIPPED | OUTPATIENT
Start: 2024-06-05

## 2024-06-04 RX ORDER — HYDROCHLOROTHIAZIDE 25 MG/1
25 TABLET ORAL DAILY
Status: DISCONTINUED | OUTPATIENT
Start: 2024-06-04 | End: 2024-06-04 | Stop reason: HOSPADM

## 2024-06-04 RX ORDER — AMIODARONE HYDROCHLORIDE 200 MG/1
TABLET ORAL
Qty: 50 TABLET | Refills: 0 | Status: SHIPPED | OUTPATIENT
Start: 2024-06-04 | End: 2024-06-17

## 2024-06-04 RX ORDER — AMIODARONE HYDROCHLORIDE 400 MG/1
400 TABLET ORAL
Qty: 11 TABLET | Refills: 0 | Status: CANCELLED | OUTPATIENT
Start: 2024-06-04 | End: 2024-06-08

## 2024-06-04 RX ADMIN — PANTOPRAZOLE SODIUM 40 MG: 40 TABLET, DELAYED RELEASE ORAL at 05:36

## 2024-06-04 RX ADMIN — LOSARTAN POTASSIUM 100 MG: 50 TABLET, FILM COATED ORAL at 08:17

## 2024-06-04 RX ADMIN — AMIODARONE HYDROCHLORIDE 400 MG: 200 TABLET ORAL at 09:04

## 2024-06-04 RX ADMIN — AMLODIPINE BESYLATE 10 MG: 10 TABLET ORAL at 08:17

## 2024-06-04 RX ADMIN — OXYCODONE HYDROCHLORIDE 10 MG: 10 TABLET ORAL at 01:07

## 2024-06-04 RX ADMIN — PAROXETINE 20 MG: 20 TABLET, FILM COATED ORAL at 08:17

## 2024-06-04 RX ADMIN — METOPROLOL TARTRATE 25 MG: 25 TABLET, FILM COATED ORAL at 12:36

## 2024-06-04 RX ADMIN — HYDROCHLOROTHIAZIDE 25 MG: 25 TABLET ORAL at 09:04

## 2024-06-04 RX ADMIN — AMIODARONE HYDROCHLORIDE 400 MG: 200 TABLET ORAL at 12:36

## 2024-06-04 RX ADMIN — RIVAROXABAN 15 MG: 15 TABLET, FILM COATED ORAL at 08:17

## 2024-06-04 RX ADMIN — ASPIRIN 81 MG: 81 TABLET, COATED ORAL at 08:17

## 2024-06-04 RX ADMIN — CLOPIDOGREL BISULFATE 75 MG: 75 TABLET ORAL at 08:17

## 2024-06-04 RX ADMIN — INSULIN LISPRO 3 UNITS: 100 INJECTION, SOLUTION INTRAVENOUS; SUBCUTANEOUS at 12:36

## 2024-06-04 RX ADMIN — Medication 1 PATCH: at 08:16

## 2024-06-04 RX ADMIN — ONDANSETRON 4 MG: 2 INJECTION INTRAMUSCULAR; INTRAVENOUS at 09:06

## 2024-06-04 RX ADMIN — DOCUSATE SODIUM 100 MG: 100 CAPSULE, LIQUID FILLED ORAL at 08:17

## 2024-06-04 RX ADMIN — METOPROLOL TARTRATE 25 MG: 25 TABLET, FILM COATED ORAL at 01:03

## 2024-06-04 NOTE — NURSING NOTE
Patient left with all belongings, IV was removed and AVS was discussed. Patient had no questions at time of discharge.

## 2024-06-04 NOTE — PLAN OF CARE
Problem: Potential for Falls  Goal: Patient will remain free of falls  Description: INTERVENTIONS:  - Educate patient/family on patient safety including physical limitations  - Instruct patient to call for assistance with activity   - Consult OT/PT to assist with strengthening/mobility   - Keep Call bell within reach  - Keep bed low and locked with side rails adjusted as appropriate  - Keep care items and personal belongings within reach  - Initiate and maintain comfort rounds  - Make Fall Risk Sign visible to staff  - Offer Toileting every  Hours, in advance of need  - Initiate/Maintain alarm  - Obtain necessary fall risk management equipment:   - Apply yellow socks and bracelet for high fall risk patients  - Consider moving patient to room near nurses station  Outcome: Progressing     Problem: PAIN - ADULT  Goal: Verbalizes/displays adequate comfort level or baseline comfort level  Description: Interventions:  - Encourage patient to monitor pain and request assistance  - Assess pain using appropriate pain scale  - Administer analgesics based on type and severity of pain and evaluate response  - Implement non-pharmacological measures as appropriate and evaluate response  - Consider cultural and social influences on pain and pain management  - Notify physician/advanced practitioner if interventions unsuccessful or patient reports new pain  Outcome: Progressing     Problem: INFECTION - ADULT  Goal: Absence or prevention of progression during hospitalization  Description: INTERVENTIONS:  - Assess and monitor for signs and symptoms of infection  - Monitor lab/diagnostic results  - Monitor all insertion sites, i.e. indwelling lines, tubes, and drains  - Monitor endotracheal if appropriate and nasal secretions for changes in amount and color  - Carroll appropriate cooling/warming therapies per order  - Administer medications as ordered  - Instruct and encourage patient and family to use good hand hygiene technique  -  Identify and instruct in appropriate isolation precautions for identified infection/condition  Outcome: Progressing  Goal: Absence of fever/infection during neutropenic period  Description: INTERVENTIONS:  - Monitor WBC    Outcome: Progressing     Problem: SAFETY ADULT  Goal: Patient will remain free of falls  Description: INTERVENTIONS:  - Educate patient/family on patient safety including physical limitations  - Instruct patient to call for assistance with activity   - Consult OT/PT to assist with strengthening/mobility   - Keep Call bell within reach  - Keep bed low and locked with side rails adjusted as appropriate  - Keep care items and personal belongings within reach  - Initiate and maintain comfort rounds  - Make Fall Risk Sign visible to staff  - Offer Toileting every  Hours, in advance of need  - Initiate/Maintain alarm  - Obtain necessary fall risk management equipment:   - Apply yellow socks and bracelet for high fall risk patients  - Consider moving patient to room near nurses station  Outcome: Progressing  Goal: Maintain or return to baseline ADL function  Description: INTERVENTIONS:  -  Assess patient's ability to carry out ADLs; assess patient's baseline for ADL function and identify physical deficits which impact ability to perform ADLs (bathing, care of mouth/teeth, toileting, grooming, dressing, etc.)  - Assess/evaluate cause of self-care deficits   - Assess range of motion  - Assess patient's mobility; develop plan if impaired  - Assess patient's need for assistive devices and provide as appropriate  - Encourage maximum independence but intervene and supervise when necessary  - Involve family in performance of ADLs  - Assess for home care needs following discharge   - Consider OT consult to assist with ADL evaluation and planning for discharge  - Provide patient education as appropriate  Outcome: Progressing  Goal: Maintains/Returns to pre admission functional level  Description:  INTERVENTIONS:  - Perform AM-PAC 6 Click Basic Mobility/ Daily Activity assessment daily.  - Set and communicate daily mobility goal to care team and patient/family/caregiver.   - Collaborate with rehabilitation services on mobility goals if consulted  - Perform Range of Motion times a day.  - Reposition patient every hours.  - Dangle patient  times a day  - Stand patient  times a day  - Ambulate patient  times a day  - Out of bed to chair  times a day   - Out of bed for meals  times a day  - Out of bed for toileting  - Record patient progress and toleration of activity level   Outcome: Progressing     Problem: DISCHARGE PLANNING  Goal: Discharge to home or other facility with appropriate resources  Description: INTERVENTIONS:  - Identify barriers to discharge w/patient and caregiver  - Arrange for needed discharge resources and transportation as appropriate  - Identify discharge learning needs (meds, wound care, etc.)  - Arrange for interpretive services to assist at discharge as needed  - Refer to Case Management Department for coordinating discharge planning if the patient needs post-hospital services based on physician/advanced practitioner order or complex needs related to functional status, cognitive ability, or social support system  Outcome: Progressing     Problem: Knowledge Deficit  Goal: Patient/family/caregiver demonstrates understanding of disease process, treatment plan, medications, and discharge instructions  Description: Complete learning assessment and assess knowledge base.  Interventions:  - Provide teaching at level of understanding  - Provide teaching via preferred learning methods  Outcome: Progressing     Problem: CARDIOVASCULAR - ADULT  Goal: Maintains optimal cardiac output and hemodynamic stability  Description: INTERVENTIONS:  - Monitor I/O, vital signs and rhythm  - Monitor for S/S and trends of decreased cardiac output  - Administer and titrate ordered vasoactive medications to optimize  hemodynamic stability  - Assess quality of pulses, skin color and temperature  - Assess for signs of decreased coronary artery perfusion  - Instruct patient to report change in severity of symptoms  Outcome: Progressing

## 2024-06-04 NOTE — PROGRESS NOTES
Cardiology Progress Note   MD [facility] Mcgrath MD, [facility]  Ashok Sheppard DO, [facility]  MD Edna Harding DO, [facility]  Usman Mcdonald DO, [facility]  ----------------------------------------------------------------  [address] [address]    Gulshan Arias 63 y.o. male MRN: [id number]  Unit/Bed#: E4 -01 Encounter: [id number]      ASSESSMENT:   Precordial chest pain  Probable NSTEMI type I, possible type II MI  Paroxysmal atrial fibrillation with RVR on Eliquis  Holter w/ SR avg HR 77 bpm, occasional PACs and PVCs, December 2018  43% burden AF with average HR 98 bpm by [place], March 2024  Hypertension  LVEF 55-60%, mild LVH, normal diastolic function, mild MR, trace TR, September 2016  Dyslipidemia  Type 2 diabetes mellitus  Tobacco use    PLAN:  Patient converted back to sinus rhythm  Start amiodarone 400 mg 3 times daily for 4 days then 200 mg daily thereafter  Xarelto for thromboembolic prophylaxis  Triple therapy for 2 weeks with aspirin, Brilinta and Xarelto followed by dual antiplatelet therapy for 1 year  Importance of compliance has been discussed at length with both patient and family at bedside  Continue high intensity statin, losartan, amlodipine and metoprolol  Check 2D echocardiogram to reassess cardiac structure and function post MI  Refer patient to electrophysiology to discuss potential ablation versus alternate antiarrhythmic drug  Recommend cardiac rehabilitation post discharge  Discharge later today reasonable from CV perspective if feeling back to baseline with physical activity    Signed: Ashok Sheppard DO, [facility], LEIGH ANN, FACANNI      History of Present Illness:  Patient seen and examined.  Patient feels back to baseline.  Admits to constipation.  Denies chest pain, pressure, tightness or squeezing.  Denies lightheadedness, dizziness or palpitations.  Denies lower extremity swelling, orthopnea or paroxysmal nocturnal  dyspnea.      Review of Systems:  Review of Systems   Constitutional: Negative for decreased appetite, fever, weight gain and weight loss.   HENT:  Negative for congestion and sore throat.    Eyes:  Negative for visual disturbance.   Cardiovascular:  Negative for chest pain, dyspnea on exertion, leg swelling, near-syncope and palpitations.   Respiratory:  Negative for cough and shortness of breath.    Hematologic/Lymphatic: Negative for bleeding problem.   Skin:  Negative for rash.   Musculoskeletal:  Negative for myalgias and neck pain.   Gastrointestinal:  Negative for abdominal pain and nausea.   Neurological:  Negative for light-headedness and weakness.   Psychiatric/Behavioral:  Negative for depression.        No Known Allergies    No current facility-administered medications on file prior to encounter.     Current Outpatient Medications on File Prior to Encounter   Medication Sig    ALPRAZolam (XANAX) 0.25 mg tablet TAKE 1 TABLET BY MOUTH TWICE DAILY AS NEEDED FOR ANXIETY    metFORMIN (GLUCOPHAGE) 1000 MG tablet Take 1 tablet (1,000 mg total) by mouth 2 (two) times a day with meals    metoprolol succinate (TOPROL-XL) 25 mg 24 hr tablet Take 1 tablet (25 mg total) by mouth daily    rivaroxaban (Xarelto) 15 mg tablet Take 1 tablet (15 mg total) by mouth daily with breakfast    albuterol (PROVENTIL HFA,VENTOLIN HFA) 90 mcg/act inhaler Inhale 2 puffs every 6 (six) hours as needed for wheezing or shortness of breath    amiodarone 200 mg tablet Take 1 tablet (200 mg total) by mouth daily Starting 3/23/24    amLODIPine (NORVASC) 10 mg tablet Take 1 tablet (10 mg total) by mouth daily    dulaglutide (Trulicity) 0.75 MG/0.5ML injection Inject 0.5 mL (0.75 mg total) under the skin every 7 days    fluticasone (Flovent HFA) 44 mcg/act inhaler Inhale 2 puffs 2 (two) times a day Rinse mouth after use. (Patient not taking: Reported on 2/15/2024)    glucose blood test strip 1 each by Other route 2 (two) times a day Use as  instructed    Insulin Glargine Solostar (Lantus SoloStar) 100 UNIT/ML SOPN Inject 0.28 mL (28 Units total) under the skin daily at bedtime    Insulin Pen Needle (TechLite Pen Needles) 31G X 8 MM MISC Apply topically 4 (four) times a day    Lancets Misc. (Accu-Chek FastClix Lancet) KIT Inject under the skin 2 (two) times a day    losartan-hydrochlorothiazide (HYZAAR) 100-25 MG per tablet Take 1 tablet by mouth daily    oxyCODONE (ROXICODONE) 30 MG immediate release tablet     pantoprazole (PROTONIX) 40 mg tablet Take 1 tablet (40 mg total) by mouth daily    PARoxetine (PAXIL) 20 mg tablet Take 1 tablet (20 mg total) by mouth daily    polyethylene glycol (MIRALAX) 17 g packet Take 17 g by mouth daily for 5 days    simvastatin (ZOCOR) 40 mg tablet Take 1 tablet (40 mg total) by mouth daily at bedtime    zolpidem (AMBIEN) 10 mg tablet Take 10 mg by mouth daily at bedtime as needed for sleep        Current Facility-Administered Medications   Medication Dose Route Frequency Provider Last Rate    acetaminophen  650 mg Oral Q4H PRN JACOB Leahy      albuterol  2 puff Inhalation Q6H PRN JACOB Leahy      ALPRAZolam  0.25 mg Oral BID PRN JACOB Leahy      amLODIPine  10 mg Oral Daily JACOB Leahy      aspirin  81 mg Oral Daily JACOB Leahy      atorvastatin  80 mg Oral Daily With Dinner JACOB Leahy      clopidogrel  75 mg Oral Daily JACOB Leahy      diphenhydrAMINE  25 mg Oral HS PRN JACOB Leahy      docusate sodium  100 mg Oral BID JACOB Leahy      insulin glargine  28 Units Subcutaneous HS JACOB Leahy      insulin lispro  1-6 Units Subcutaneous 4x Daily (AC & HS) JACOB Leahy      losartan  100 mg Oral Daily JACOB Leahy      metoclopramide  10 mg Intravenous Q6H PRN JACOB Leahy      metoprolol tartrate  25 mg Oral Q6H JACOB Leahy       morphine injection  2 mg Intravenous Q4H PRN JACOB Leahy      nicotine  1 patch Transdermal Daily JACOB Leahy      ondansetron  4 mg Intravenous Q4H PRN JACOB Leahy      oxyCODONE  10 mg Oral Q6H PRN JACOB Leahy      oxyCODONE  30 mg Oral Q12H PRN JACOB Leahy      pantoprazole  40 mg Oral Early Morning JACOB Leahy      PARoxetine  20 mg Oral Daily JACOB Leahy      pneumococcal 20-trinity conj vacc  0.5 mL Intramuscular Prior to discharge JACOB Leahy      rivaroxaban  15 mg Oral Daily With Breakfast JACOB Leahy      senna  1 tablet Oral HS JACOB Leahy      sodium chloride (PF)  3 mL Intravenous Q1H PRN JACOB Leahy      zolpidem  10 mg Oral HS PRN JACOB Leahy              Vitals:    06/04/24 0017 06/04/24 0258 06/04/24 0544 06/04/24 0729   BP: 141/82 140/94 111/69 152/78   BP Location: Left arm Left arm  Right arm   Pulse: 80 75 74 77   Resp: 16 16  16   Temp: 98.5 °F (36.9 °C) 98.1 °F (36.7 °C)  97.5 °F (36.4 °C)   TempSrc: Temporal Temporal  Temporal   SpO2: 94% 93%  91%   Weight:         Body mass index is 32.66 kg/m².      Intake/Output Summary (Last 24 hours) at 6/4/2024 0740  Last data filed at 6/4/2024 0543  Gross per 24 hour   Intake 1200 ml   Output --   Net 1200 ml       Weight change:     PHYSICAL EXAMINATION:  Gen: Awake, Alert, NAD  Head/eyes: AT/NC, pupils equal and round, Anicteric  ENT: mmm  Neck: Supple, No elevated JVP, trachea midline  Resp: CTA bilaterally no w/r/r  CV: RRR +S1, S2, No m/r/g  Abd: Soft, NT/ND + BS  Ext: no LE edema bilaterally; r radial site c/d/i  Neuro: Follows commands, moves all extermities  Psych: Appropriate affect, normal mood, pleasant attitude, non-combative  Skin: warm; no rash, erythema or venous stasis changes on exposed skin    Lab Results:  Results from last 7 days   Lab Units 06/04/24  0538   WBC  "Thousand/uL 11.85*   HEMOGLOBIN g/dL 13.2   HEMATOCRIT % 39.3   PLATELETS Thousands/uL 208     Results from last 7 days   Lab Units 06/04/24  0528 06/03/24  0621   POTASSIUM mmol/L 3.6 3.5   CHLORIDE mmol/L 101 96   CO2 mmol/L 28 28   BUN mg/dL 12 14   CREATININE mg/dL 1.00 1.07   CALCIUM mg/dL 8.9 9.0   ALK PHOS U/L  --  60   ALT U/L  --  25   AST U/L  --  83*     No results found for: \"TROPONINT\"      Results from last 7 days   Lab Units 06/03/24  0621 06/02/24  2154 06/02/24  1941 06/02/24  1838 06/02/24  1747   HS TNI 0HR ng/L  --   --   --   --  909*   HS TNI 2HR ng/L  --   --  3,132*  --   --    HS TNI 4HR ng/L  --  10,773*  --   --   --    HS TNI RAND ng/L 16,218*  --   --  1,215*  --      Results from last 7 days   Lab Units 06/02/24  1747   INR  0.91       Tele: SR    This note was completed in part utilizing M-Modal Fluency Direct Software.  Grammatical errors, random word insertions, spelling mistakes, and incomplete sentences may be an occasional consequence of this system secondary to software limitations, ambient noise, and hardware issues.  If you have any questions or concerns about the content, text, or information contained within the body of this dictation, please contact the provider for clarification.      "

## 2024-06-04 NOTE — RESTORATIVE TECHNICIAN NOTE
Restorative Technician Note      Patient Name: Gulshan Arias     Restorative Tech Visit Date: 06/04/24  Note Type: Mobility  Patient Position Upon Consult: Supine  Activity Performed: Ambulated; Range of motion  Patient Position at End of Consult: Supine; All needs within reach

## 2024-06-04 NOTE — DISCHARGE SUMMARY
[facility]  Discharge Summary  Name: Gulshan Arias I MRN: [id number]  Unit/Bed#: AL CAR NON INVI Date of Admission: 6/2/2024   Date of Service: 6/2/2024  I Hospital Day: 2    * Rapid atrial fibrillation (HCC)  Assessment & Plan  History of atrial fibrillation insulin-dependent diabetes hypertension anxiety and chronic pain presented to the hospital with chest pain found to have elevated cardiac enzymes and atrial fibrillation/RVR  Plan of care discussed by ED with cardiology: Recommending metoprolol 25 mg every 6 hours with heparin infusion.  Continue patient amiodarone  Patient on Heparin drip on admission  Caffeine consumption: Drinks 5 to 6 cups of coffee a day.  Advised to cut down as this may be contributing to his tachycardia.  Rate controlled on telemetry  Transitioned off Heparin drip to Brilinta, Xarelto, and ASA 81  Aspirin for two weeks then stop taking.  Will need to continue Brilinta and Xarelto for a year  Start Amiodarone 400 mg TID for 4 days then 200 mg daily after    NSTEMI (non-ST elevated myocardial infarction) (HCC)  Assessment & Plan  Patient admitted on Sunday 6/2 with severe chest pain.  Chest pain initially started Friday night and worsened through the weekend until presentation to ED  Chest pain with elevated cardiac enzymes secondary to atrial fibrillation/tachycardia.    6/2 CXR: No acute cardiopulmonary disease  EKG: T wave inversions lateral leads  Continue Heparin drip  PRN IV Morphine ordered, bowel regimen initiated  Continued severe chest pain, repeat EKG this am appears unchanged; cardiology notified  6/3 Repeat random trop 16,200  6/3 Cardiac catheterization found 99% occlusion of mid OM2 successfully treated with placement of 2 overlapping ASHVIN's  Repeat EKG s/p cath was NSR with continued ST and T wave abnormality  Ok for discharge per cardiology on ASA, Brilinta, and Xarelto    Lab Results   Component Value Date    HSTNI0 909 (H)  06/02/2024    HSTNI2 3,132 (H) 06/02/2024    HSTNI4 10,773 (H) 06/02/2024    HSTNI 1,215 (H) 06/02/2024       Hyponatremia  Assessment & Plan  Partially due to hyperglycemia  Will hold HCTZ component of losartan/HCT  HCTZ resumed    Results from last 7 days   Lab Units 06/04/24  0528 06/03/24  0621 06/02/24  1747   SODIUM mmol/L 134* 132* 129*       Generalized anxiety disorder  Assessment & Plan  Continue paroxetine with alprazolam as needed    Chronic midline low back pain without sciatica  Assessment & Plan  Confirmed on .  On oxycodone 30 mg as needed    Primary insomnia  Assessment & Plan  Prior to admission on zolpidem.  Confirmed on .    Type 2 diabetes mellitus without complication, with long-term current use of insulin ([facility])  Assessment & Plan  Lab Results   Component Value Date    HGBA1C 9.5 (A) 01/04/2024       Recent Labs     06/03/24  1557 06/03/24  2114 06/04/24  0732 06/04/24  1111   POCGLU 351* 93 137 256*       Hyperglycemia upon arrival.  Continue glargine 28 units at HS  Continue SSI  Diabetic diet  Patient with multiple high glucose levels 6/3, on review did not receive breakfast and lunch SSI  Resume home regimen on discharge and follow up with PCP    Essential hypertension  Assessment & Plan  Elevated on arrival.  Continue amlodipine.  Holding HCTZ portion of losartan/HCT due to hyponatremia.  Continue Metoprolol increased to 25 mg every 6 hours per ED discussed with cardiology for better rate control of atrial fibrillation  BP stable        Medical Problems       Resolved Problems  Date Reviewed: 2/15/2024   None       Discharging Physician / Practitioner: JACOB Leahy  PCP: Osei Cleaning MD  Admission Date:   Admission Orders (From admission, onward)       Ordered        06/02/24 1906  INPATIENT ADMISSION  Once                          Discharge Date: 06/04/24    Consultations During Hospital Stay:  IP CONSULT TO CARDIOLOGY  IP CONSULT TO CARDIOLOGY    Procedures  Performed:   6/3 Cardiac catheterization: 99% occlusion of the mid OM2 successfully treated with placement of 2 overlapping ASHVIN's    Significant Findings / Test Results:   X-ray chest 1 view portable    Result Date: 6/2/2024  No acute cardiopulmonary disease.     Incidental Findings:   None    Test Results Pending at Discharge (will require follow up):   None     Outpatient Tests Requested:  None    Complications:  None    Reason for Admission: Paroxysmal afib with RVR and possible NSTEMI    Hospital Course:   Gulshan Arias is a 63 y.o. male patient with  has a past medical history of Depression, Diabetes mellitus (HCC), Hyperlipidemia, Hypertension, and Tobacco abuse. who originally presented to the hospital on 6/2/2024 due to Chest Pain (Woke up with left sided cp, describes as sharp, non radiating). He was found to be in afib with RVR which was successfully treated with metoprolol.  He had elevated troponins with T wave inversions suspicious for NSTEMI on admission.  He was placed on Heparin drip and taken to cardiac cath lab the following day.  He was found to have 99% occlusion of OM2 which was treated with placement of 2 overlapping ASHVIN's.  He was in NSR on EKG post cath lab.  Ok to discharge the day after by cardiology on ASA 81 mg for 2 weeks and Brilinta and Xarelto.  Also ordered 400 mg Amiodarone TID for 4 days to be followed by 200 mg daily.  Plan for outpatient cardiology follow up and cardiac rehab.      Please see above list of diagnoses and related plan for additional information.     Condition at Discharge: stable    Discharge Day Visit / Exam:   Subjective:  Patient is feeling much improved today. He denies chest pain, SOB, and lightheadedness. He is able to ambulate around the room.  He said he had a bowel movement last night.  He has been eating and drinking.  He has no questions or complaints at this time.  Vitals: Blood Pressure: 141/84 (06/04/24 1236)  Pulse: 79 (06/04/24  "1236)  Temperature: 98.2 °F (36.8 °C) (06/04/24 1108)  Temp Source: Temporal (06/04/24 1108)  Respirations: 16 (06/04/24 1108)  Height: 5' 4\" (162.6 cm) (06/04/24 0853)  Weight - Scale: 86.2 kg (190 lb) (06/04/24 0853)  SpO2: 96 % (06/04/24 1108)  Exam:   Physical Exam  Vitals and nursing note reviewed.   Constitutional:       Appearance: Normal appearance. He is obese.   HENT:      Head: Normocephalic.      Nose: Nose normal.      Mouth/Throat:      Mouth: Mucous membranes are moist.      Pharynx: Oropharynx is clear.   Eyes:      Conjunctiva/sclera: Conjunctivae normal.   Cardiovascular:      Rate and Rhythm: Normal rate and regular rhythm.      Pulses: Normal pulses.      Heart sounds: Normal heart sounds. No murmur heard.  Pulmonary:      Effort: Pulmonary effort is normal. No respiratory distress.      Breath sounds: Normal breath sounds. No wheezing.   Chest:      Chest wall: No tenderness.   Abdominal:      General: Bowel sounds are increased. There is no distension.      Palpations: Abdomen is soft.      Tenderness: There is no abdominal tenderness. There is no guarding.   Musculoskeletal:         General: Normal range of motion.      Right lower leg: No edema.      Left lower leg: No edema.   Skin:     General: Skin is warm and dry.      Capillary Refill: Capillary refill takes less than 2 seconds.   Neurological:      General: No focal deficit present.      Mental Status: He is alert and oriented to person, place, and time. Mental status is at baseline.   Psychiatric:         Behavior: Behavior normal.         Thought Content: Thought content normal.          Discussion with Family: Updated  (wife and daughter) at bedside.  #[id number]    Discharge instructions/Information to patient and family:   See after visit summary for information provided to patient and family.      Provisions for Follow-Up Care:  See after visit summary for information related to follow-up care and any " pertinent home health orders.      Mobility at time of Discharge:   Basic Mobility Inpatient Raw Score: 23  JH-HLM Goal: 7: Walk 25 feet or more  JH-HLM Achieved: 7: Walk 25 feet or more  HLM Goal achieved. Continue to encourage appropriate mobility.     Disposition:   Home    Planned Readmission: None     Discharge Statement:  I spent 65 minutes discharging the patient. This time was spent on the day of discharge. I had direct contact with the patient on the day of discharge. Greater than 50% of the total time was spent examining patient, answering all patient questions, arranging and discussing plan of care with patient as well as directly providing post-discharge instructions.  Additional time then spent on discharge activities.    Discharge Medications:  See after visit summary for reconciled discharge medications provided to patient and/or family.      **Please Note: This note may have been constructed using a voice recognition system**

## 2024-06-04 NOTE — PLAN OF CARE
Problem: Potential for Falls  Goal: Patient will remain free of falls  Description: INTERVENTIONS:  - Educate patient/family on patient safety including physical limitations  - Instruct patient to call for assistance with activity   - Consult OT/PT to assist with strengthening/mobility   - Keep Call bell within reach  - Keep bed low and locked with side rails adjusted as appropriate  - Keep care items and personal belongings within reach  - Initiate and maintain comfort rounds  - Make Fall Risk Sign visible to staff  - Offer Toileting every 2 Hours, in advance of need  - Initiate/Maintain bed/chair alarm  - Obtain necessary fall risk management equipment: bed/chair   - Apply yellow socks and bracelet for high fall risk patients  - Consider moving patient to room near nurses station  Outcome: Progressing     Problem: PAIN - ADULT  Goal: Verbalizes/displays adequate comfort level or baseline comfort level  Description: Interventions:  - Encourage patient to monitor pain and request assistance  - Assess pain using appropriate pain scale  - Administer analgesics based on type and severity of pain and evaluate response  - Implement non-pharmacological measures as appropriate and evaluate response  - Consider cultural and social influences on pain and pain management  - Notify physician/advanced practitioner if interventions unsuccessful or patient reports new pain  Outcome: Progressing     Problem: INFECTION - ADULT  Goal: Absence or prevention of progression during hospitalization  Description: INTERVENTIONS:  - Assess and monitor for signs and symptoms of infection  - Monitor lab/diagnostic results  - Monitor all insertion sites, i.e. indwelling lines, tubes, and drains  - Monitor endotracheal if appropriate and nasal secretions for changes in amount and color  - [place] appropriate cooling/warming therapies per order  - Administer medications as ordered  - Instruct and encourage patient and family to use good hand  hygiene technique  - Identify and instruct in appropriate isolation precautions for identified infection/condition  Outcome: Progressing  Goal: Absence of fever/infection during neutropenic period  Description: INTERVENTIONS:  - Monitor WBC    Outcome: Progressing     Problem: SAFETY ADULT  Goal: Patient will remain free of falls  Description: INTERVENTIONS:  - Educate patient/family on patient safety including physical limitations  - Instruct patient to call for assistance with activity   - Consult OT/PT to assist with strengthening/mobility   - Keep Call bell within reach  - Keep bed low and locked with side rails adjusted as appropriate  - Keep care items and personal belongings within reach  - Initiate and maintain comfort rounds  - Make Fall Risk Sign visible to staff  - Offer Toileting every 2 Hours, in advance of need  - Initiate/Maintain bed/chair alarm  - Obtain necessary fall risk management equipment: bed/chair alarm  - Apply yellow socks and bracelet for high fall risk patients  - Consider moving patient to room near nurses station  Outcome: Progressing  Goal: Maintain or return to baseline ADL function  Description: INTERVENTIONS:  -  Assess patient's ability to carry out ADLs; assess patient's baseline for ADL function and identify physical deficits which impact ability to perform ADLs (bathing, care of mouth/teeth, toileting, grooming, dressing, etc.)  - Assess/evaluate cause of self-care deficits   - Assess range of motion  - Assess patient's mobility; develop plan if impaired  - Assess patient's need for assistive devices and provide as appropriate  - Encourage maximum independence but intervene and supervise when necessary  - Involve family in performance of ADLs  - Assess for home care needs following discharge   - Consider OT consult to assist with ADL evaluation and planning for discharge  - Provide patient education as appropriate  Outcome: Progressing  Goal: Maintains/Returns to pre admission  functional level  Description: INTERVENTIONS:  - Perform AM-PAC 6 Click Basic Mobility/ Daily Activity assessment daily.  - Set and communicate daily mobility goal to care team and patient/family/caregiver.   - Collaborate with rehabilitation services on mobility goals if consulted  - Perform Range of Motion 4 times a day.  - Reposition patient every 2 hours.  - Dangle patient 3 times a day  - Stand patient 3 times a day  - Ambulate patient 3 times a day  - Out of bed to chair 3 times a day   - Out of bed for meals 3 times a day  - Out of bed for toileting  - Record patient progress and toleration of activity level   Outcome: Progressing     Problem: DISCHARGE PLANNING  Goal: Discharge to home or other facility with appropriate resources  Description: INTERVENTIONS:  - Identify barriers to discharge w/patient and caregiver  - Arrange for needed discharge resources and transportation as appropriate  - Identify discharge learning needs (meds, wound care, etc.)  - Arrange for interpretive services to assist at discharge as needed  - Refer to Case Management Department for coordinating discharge planning if the patient needs post-hospital services based on physician/advanced practitioner order or complex needs related to functional status, cognitive ability, or social support system  Outcome: Progressing     Problem: Knowledge Deficit  Goal: Patient/family/caregiver demonstrates understanding of disease process, treatment plan, medications, and discharge instructions  Description: Complete learning assessment and assess knowledge base.  Interventions:  - Provide teaching at level of understanding  - Provide teaching via preferred learning methods  Outcome: Progressing     Problem: CARDIOVASCULAR - ADULT  Goal: Maintains optimal cardiac output and hemodynamic stability  Description: INTERVENTIONS:  - Monitor I/O, vital signs and rhythm  - Monitor for S/S and trends of decreased cardiac output  - Administer and titrate  ordered vasoactive medications to optimize hemodynamic stability  - Assess quality of pulses, skin color and temperature  - Assess for signs of decreased coronary artery perfusion  - Instruct patient to report change in severity of symptoms  Outcome: Progressing

## 2024-06-06 ENCOUNTER — TRANSITIONAL CARE MANAGEMENT (OUTPATIENT)
Dept: FAMILY MEDICINE CLINIC | Facility: CLINIC | Age: 64
End: 2024-06-06

## 2024-06-10 ENCOUNTER — APPOINTMENT (EMERGENCY)
Dept: RADIOLOGY | Facility: HOSPITAL | Age: 64
DRG: 315 | End: 2024-06-10
Payer: MEDICARE

## 2024-06-10 ENCOUNTER — HOSPITAL ENCOUNTER (INPATIENT)
Facility: HOSPITAL | Age: 64
LOS: 2 days | Discharge: HOME | DRG: 315 | End: 2024-06-12
Attending: EMERGENCY MEDICINE | Admitting: INTERNAL MEDICINE
Payer: MEDICARE

## 2024-06-10 DIAGNOSIS — Z79.4 TYPE 2 DIABETES MELLITUS WITHOUT COMPLICATION, WITH LONG-TERM CURRENT USE OF INSULIN (HCC): ICD-10-CM

## 2024-06-10 DIAGNOSIS — I25.10 CAD (CORONARY ARTERY DISEASE): ICD-10-CM

## 2024-06-10 DIAGNOSIS — R07.9 CHEST PAIN: Primary | ICD-10-CM

## 2024-06-10 DIAGNOSIS — E11.9 TYPE 2 DIABETES MELLITUS WITHOUT COMPLICATION, WITH LONG-TERM CURRENT USE OF INSULIN (HCC): ICD-10-CM

## 2024-06-10 DIAGNOSIS — I48.91 ATRIAL FIBRILLATION (HCC): ICD-10-CM

## 2024-06-10 PROBLEM — R07.89 OTHER CHEST PAIN: Status: ACTIVE | Noted: 2019-04-25

## 2024-06-10 LAB
2HR DELTA HS TROPONIN: -211 NG/L
4HR DELTA HS TROPONIN: -249 NG/L
ALBUMIN SERPL BCP-MCNC: 4.6 G/DL (ref 3.5–5)
ALP SERPL-CCNC: 74 U/L (ref 34–104)
ALT SERPL W P-5'-P-CCNC: 15 U/L (ref 7–52)
ANION GAP SERPL CALCULATED.3IONS-SCNC: 8 MMOL/L (ref 4–13)
AST SERPL W P-5'-P-CCNC: 13 U/L (ref 13–39)
ATRIAL RATE: 208 BPM
ATRIAL RATE: 267 BPM
ATRIAL RATE: 277 BPM
ATRIAL RATE: 288 BPM
ATRIAL RATE: 288 BPM
BASOPHILS # BLD AUTO: 0.07 THOUSANDS/ÂΜL (ref 0–0.1)
BASOPHILS NFR BLD AUTO: 1 % (ref 0–1)
BILIRUB SERPL-MCNC: 0.43 MG/DL (ref 0.2–1)
BUN SERPL-MCNC: 23 MG/DL (ref 5–25)
CALCIUM SERPL-MCNC: 9.8 MG/DL (ref 8.4–10.2)
CARDIAC TROPONIN I PNL SERPL HS: 1044 NG/L
CARDIAC TROPONIN I PNL SERPL HS: 795 NG/L
CARDIAC TROPONIN I PNL SERPL HS: 833 NG/L
CHLORIDE SERPL-SCNC: 101 MMOL/L (ref 96–108)
CO2 SERPL-SCNC: 29 MMOL/L (ref 21–32)
CREAT SERPL-MCNC: 1.31 MG/DL (ref 0.6–1.3)
CRP SERPL QL: 7 MG/L
EOSINOPHIL # BLD AUTO: 0.2 THOUSAND/ÂΜL (ref 0–0.61)
EOSINOPHIL NFR BLD AUTO: 2 % (ref 0–6)
ERYTHROCYTE [DISTWIDTH] IN BLOOD BY AUTOMATED COUNT: 12.5 % (ref 11.6–15.1)
GFR SERPL CREATININE-BSD FRML MDRD: 57 ML/MIN/1.73SQ M
GLUCOSE SERPL-MCNC: 140 MG/DL (ref 65–140)
GLUCOSE SERPL-MCNC: 142 MG/DL (ref 65–140)
GLUCOSE SERPL-MCNC: 165 MG/DL (ref 65–140)
GLUCOSE SERPL-MCNC: 225 MG/DL (ref 65–140)
HCT VFR BLD AUTO: 43.2 % (ref 36.5–49.3)
HGB BLD-MCNC: 14.2 G/DL (ref 12–17)
IMM GRANULOCYTES # BLD AUTO: 0.03 THOUSAND/UL (ref 0–0.2)
IMM GRANULOCYTES NFR BLD AUTO: 0 % (ref 0–2)
LYMPHOCYTES # BLD AUTO: 3.52 THOUSANDS/ÂΜL (ref 0.6–4.47)
LYMPHOCYTES NFR BLD AUTO: 37 % (ref 14–44)
MCH RBC QN AUTO: 30.3 PG (ref 26.8–34.3)
MCHC RBC AUTO-ENTMCNC: 32.9 G/DL (ref 31.4–37.4)
MCV RBC AUTO: 92 FL (ref 82–98)
MONOCYTES # BLD AUTO: 0.8 THOUSAND/ÂΜL (ref 0.17–1.22)
MONOCYTES NFR BLD AUTO: 8 % (ref 4–12)
NEUTROPHILS # BLD AUTO: 4.92 THOUSANDS/ÂΜL (ref 1.85–7.62)
NEUTS SEG NFR BLD AUTO: 52 % (ref 43–75)
NRBC BLD AUTO-RTO: 0 /100 WBCS
P AXIS: 0 DEGREES
P AXIS: 206 DEGREES
P AXIS: 209 DEGREES
P AXIS: 228 DEGREES
PLATELET # BLD AUTO: 301 THOUSANDS/UL (ref 149–390)
PMV BLD AUTO: 10.2 FL (ref 8.9–12.7)
POTASSIUM SERPL-SCNC: 3.7 MMOL/L (ref 3.5–5.3)
PROT SERPL-MCNC: 8.3 G/DL (ref 6.4–8.4)
QRS AXIS: 70 DEGREES
QRS AXIS: 74 DEGREES
QRS AXIS: 74 DEGREES
QRS AXIS: 75 DEGREES
QRS AXIS: 88 DEGREES
QRSD INTERVAL: 86 MS
QRSD INTERVAL: 94 MS
QRSD INTERVAL: 94 MS
QRSD INTERVAL: 96 MS
QRSD INTERVAL: 98 MS
QT INTERVAL: 316 MS
QT INTERVAL: 378 MS
QT INTERVAL: 382 MS
QT INTERVAL: 402 MS
QT INTERVAL: 424 MS
QTC INTERVAL: 382 MS
QTC INTERVAL: 409 MS
QTC INTERVAL: 413 MS
QTC INTERVAL: 427 MS
QTC INTERVAL: 430 MS
RBC # BLD AUTO: 4.68 MILLION/UL (ref 3.88–5.62)
SODIUM SERPL-SCNC: 138 MMOL/L (ref 135–147)
T WAVE AXIS: 222 DEGREES
T WAVE AXIS: 230 DEGREES
T WAVE AXIS: 243 DEGREES
T WAVE AXIS: 244 DEGREES
T WAVE AXIS: 251 DEGREES
VENTRICULAR RATE: 62 BPM
VENTRICULAR RATE: 68 BPM
VENTRICULAR RATE: 69 BPM
VENTRICULAR RATE: 72 BPM
VENTRICULAR RATE: 88 BPM
WBC # BLD AUTO: 9.54 THOUSAND/UL (ref 4.31–10.16)

## 2024-06-10 PROCEDURE — 84484 ASSAY OF TROPONIN QUANT: CPT | Performed by: EMERGENCY MEDICINE

## 2024-06-10 PROCEDURE — 36415 COLL VENOUS BLD VENIPUNCTURE: CPT | Performed by: EMERGENCY MEDICINE

## 2024-06-10 PROCEDURE — 93005 ELECTROCARDIOGRAM TRACING: CPT

## 2024-06-10 PROCEDURE — 99222 1ST HOSP IP/OBS MODERATE 55: CPT | Performed by: STUDENT IN AN ORGANIZED HEALTH CARE EDUCATION/TRAINING PROGRAM

## 2024-06-10 PROCEDURE — 99223 1ST HOSP IP/OBS HIGH 75: CPT | Performed by: INTERNAL MEDICINE

## 2024-06-10 PROCEDURE — 96365 THER/PROPH/DIAG IV INF INIT: CPT

## 2024-06-10 PROCEDURE — 93010 ELECTROCARDIOGRAM REPORT: CPT | Performed by: STUDENT IN AN ORGANIZED HEALTH CARE EDUCATION/TRAINING PROGRAM

## 2024-06-10 PROCEDURE — 99285 EMERGENCY DEPT VISIT HI MDM: CPT | Performed by: EMERGENCY MEDICINE

## 2024-06-10 PROCEDURE — 85025 COMPLETE CBC W/AUTO DIFF WBC: CPT | Performed by: EMERGENCY MEDICINE

## 2024-06-10 PROCEDURE — 71045 X-RAY EXAM CHEST 1 VIEW: CPT

## 2024-06-10 PROCEDURE — 93010 ELECTROCARDIOGRAM REPORT: CPT

## 2024-06-10 PROCEDURE — 82948 REAGENT STRIP/BLOOD GLUCOSE: CPT

## 2024-06-10 PROCEDURE — 96375 TX/PRO/DX INJ NEW DRUG ADDON: CPT

## 2024-06-10 PROCEDURE — 86140 C-REACTIVE PROTEIN: CPT | Performed by: PHYSICIAN ASSISTANT

## 2024-06-10 PROCEDURE — 96366 THER/PROPH/DIAG IV INF ADDON: CPT

## 2024-06-10 PROCEDURE — 99285 EMERGENCY DEPT VISIT HI MDM: CPT

## 2024-06-10 PROCEDURE — 80053 COMPREHEN METABOLIC PANEL: CPT | Performed by: EMERGENCY MEDICINE

## 2024-06-10 PROCEDURE — 96361 HYDRATE IV INFUSION ADD-ON: CPT

## 2024-06-10 RX ORDER — ALPRAZOLAM 0.25 MG/1
0.25 TABLET ORAL 2 TIMES DAILY PRN
Status: DISCONTINUED | OUTPATIENT
Start: 2024-06-10 | End: 2024-06-12 | Stop reason: HOSPADM

## 2024-06-10 RX ORDER — COLCHICINE 0.6 MG/1
0.6 TABLET ORAL 2 TIMES DAILY
Status: DISCONTINUED | OUTPATIENT
Start: 2024-06-10 | End: 2024-06-11

## 2024-06-10 RX ORDER — ALBUTEROL SULFATE 90 UG/1
2 AEROSOL, METERED RESPIRATORY (INHALATION) EVERY 6 HOURS PRN
Status: DISCONTINUED | OUTPATIENT
Start: 2024-06-10 | End: 2024-06-12 | Stop reason: HOSPADM

## 2024-06-10 RX ORDER — ONDANSETRON 2 MG/ML
4 INJECTION INTRAMUSCULAR; INTRAVENOUS EVERY 6 HOURS PRN
Status: DISCONTINUED | OUTPATIENT
Start: 2024-06-10 | End: 2024-06-12

## 2024-06-10 RX ORDER — NICOTINE 21 MG/24HR
1 PATCH, TRANSDERMAL 24 HOURS TRANSDERMAL DAILY
Status: DISCONTINUED | OUTPATIENT
Start: 2024-06-11 | End: 2024-06-12 | Stop reason: HOSPADM

## 2024-06-10 RX ORDER — INSULIN LISPRO 100 [IU]/ML
1-5 INJECTION, SOLUTION INTRAVENOUS; SUBCUTANEOUS
Status: DISCONTINUED | OUTPATIENT
Start: 2024-06-10 | End: 2024-06-12 | Stop reason: HOSPADM

## 2024-06-10 RX ORDER — ASPIRIN 81 MG/1
324 TABLET, CHEWABLE ORAL ONCE
Status: COMPLETED | OUTPATIENT
Start: 2024-06-10 | End: 2024-06-10

## 2024-06-10 RX ORDER — AMIODARONE HYDROCHLORIDE 200 MG/1
200 TABLET ORAL DAILY
Status: DISCONTINUED | OUTPATIENT
Start: 2024-06-11 | End: 2024-06-12 | Stop reason: HOSPADM

## 2024-06-10 RX ORDER — INSULIN GLARGINE 100 [IU]/ML
28 INJECTION, SOLUTION SUBCUTANEOUS
Status: DISCONTINUED | OUTPATIENT
Start: 2024-06-10 | End: 2024-06-12 | Stop reason: HOSPADM

## 2024-06-10 RX ORDER — GUAIFENESIN 100 MG/5ML
200 SOLUTION ORAL EVERY 4 HOURS PRN
Status: DISCONTINUED | OUTPATIENT
Start: 2024-06-10 | End: 2024-06-12 | Stop reason: HOSPADM

## 2024-06-10 RX ORDER — NITROGLYCERIN 0.4 MG/1
0.4 TABLET SUBLINGUAL
Status: DISCONTINUED | OUTPATIENT
Start: 2024-06-10 | End: 2024-06-10

## 2024-06-10 RX ORDER — AMLODIPINE BESYLATE 10 MG/1
10 TABLET ORAL DAILY
Status: DISCONTINUED | OUTPATIENT
Start: 2024-06-11 | End: 2024-06-12 | Stop reason: HOSPADM

## 2024-06-10 RX ORDER — PAROXETINE HYDROCHLORIDE 20 MG/1
20 TABLET, FILM COATED ORAL DAILY
Status: DISCONTINUED | OUTPATIENT
Start: 2024-06-11 | End: 2024-06-12 | Stop reason: HOSPADM

## 2024-06-10 RX ORDER — ISOSORBIDE MONONITRATE 30 MG/1
30 TABLET, EXTENDED RELEASE ORAL DAILY
Status: DISCONTINUED | OUTPATIENT
Start: 2024-06-10 | End: 2024-06-12 | Stop reason: HOSPADM

## 2024-06-10 RX ORDER — ZOLPIDEM TARTRATE 5 MG/1
10 TABLET ORAL
Status: DISCONTINUED | OUTPATIENT
Start: 2024-06-10 | End: 2024-06-12 | Stop reason: HOSPADM

## 2024-06-10 RX ORDER — HYDROCHLOROTHIAZIDE 25 MG/1
25 TABLET ORAL DAILY
Status: DISCONTINUED | OUTPATIENT
Start: 2024-06-11 | End: 2024-06-12 | Stop reason: HOSPADM

## 2024-06-10 RX ORDER — METOCLOPRAMIDE HYDROCHLORIDE 5 MG/ML
10 INJECTION INTRAMUSCULAR; INTRAVENOUS EVERY 6 HOURS PRN
Status: DISCONTINUED | OUTPATIENT
Start: 2024-06-10 | End: 2024-06-12 | Stop reason: HOSPADM

## 2024-06-10 RX ORDER — NICOTINE 21 MG/24HR
1 PATCH, TRANSDERMAL 24 HOURS TRANSDERMAL DAILY
Status: DISCONTINUED | OUTPATIENT
Start: 2024-06-10 | End: 2024-06-12 | Stop reason: HOSPADM

## 2024-06-10 RX ORDER — LOSARTAN POTASSIUM 50 MG/1
100 TABLET ORAL DAILY
Status: DISCONTINUED | OUTPATIENT
Start: 2024-06-11 | End: 2024-06-12 | Stop reason: HOSPADM

## 2024-06-10 RX ORDER — METOPROLOL SUCCINATE 50 MG/1
50 TABLET, EXTENDED RELEASE ORAL DAILY
Status: DISCONTINUED | OUTPATIENT
Start: 2024-06-11 | End: 2024-06-12 | Stop reason: HOSPADM

## 2024-06-10 RX ORDER — BENZONATATE 100 MG/1
100 CAPSULE ORAL 3 TIMES DAILY
Status: DISCONTINUED | OUTPATIENT
Start: 2024-06-10 | End: 2024-06-10

## 2024-06-10 RX ORDER — MORPHINE SULFATE 4 MG/ML
4 INJECTION, SOLUTION INTRAMUSCULAR; INTRAVENOUS ONCE
Status: COMPLETED | OUTPATIENT
Start: 2024-06-10 | End: 2024-06-10

## 2024-06-10 RX ORDER — BENZONATATE 100 MG/1
100 CAPSULE ORAL 3 TIMES DAILY
Status: DISCONTINUED | OUTPATIENT
Start: 2024-06-10 | End: 2024-06-12 | Stop reason: HOSPADM

## 2024-06-10 RX ORDER — LIDOCAINE 50 MG/G
1 PATCH TOPICAL DAILY
Status: DISCONTINUED | OUTPATIENT
Start: 2024-06-10 | End: 2024-06-12 | Stop reason: HOSPADM

## 2024-06-10 RX ORDER — ATORVASTATIN CALCIUM 40 MG/1
40 TABLET, FILM COATED ORAL
Status: DISCONTINUED | OUTPATIENT
Start: 2024-06-10 | End: 2024-06-12 | Stop reason: HOSPADM

## 2024-06-10 RX ORDER — INSULIN LISPRO 100 [IU]/ML
5 INJECTION, SOLUTION INTRAVENOUS; SUBCUTANEOUS
Status: DISCONTINUED | OUTPATIENT
Start: 2024-06-10 | End: 2024-06-12 | Stop reason: HOSPADM

## 2024-06-10 RX ORDER — PANTOPRAZOLE SODIUM 40 MG/1
40 TABLET, DELAYED RELEASE ORAL
Status: DISCONTINUED | OUTPATIENT
Start: 2024-06-11 | End: 2024-06-12 | Stop reason: HOSPADM

## 2024-06-10 RX ORDER — OXYCODONE HYDROCHLORIDE 10 MG/1
30 TABLET ORAL 2 TIMES DAILY PRN
Status: DISCONTINUED | OUTPATIENT
Start: 2024-06-10 | End: 2024-06-11

## 2024-06-10 RX ORDER — NITROGLYCERIN 20 MG/100ML
5-200 INJECTION INTRAVENOUS
Status: DISCONTINUED | OUTPATIENT
Start: 2024-06-10 | End: 2024-06-10

## 2024-06-10 RX ORDER — ACETAMINOPHEN 325 MG/1
975 TABLET ORAL EVERY 8 HOURS PRN
Status: DISCONTINUED | OUTPATIENT
Start: 2024-06-10 | End: 2024-06-12 | Stop reason: HOSPADM

## 2024-06-10 RX ADMIN — ONDANSETRON 4 MG: 2 INJECTION INTRAMUSCULAR; INTRAVENOUS at 18:20

## 2024-06-10 RX ADMIN — MORPHINE SULFATE 4 MG: 4 INJECTION INTRAVENOUS at 11:43

## 2024-06-10 RX ADMIN — GUAIFENESIN 200 MG: 200 SOLUTION ORAL at 21:23

## 2024-06-10 RX ADMIN — NITROGLYCERIN 75 MCG/MIN: 20 INJECTION INTRAVENOUS at 10:58

## 2024-06-10 RX ADMIN — DICLOFENAC SODIUM 2 G: 10 GEL TOPICAL at 21:15

## 2024-06-10 RX ADMIN — INSULIN GLARGINE 28 UNITS: 100 INJECTION, SOLUTION SUBCUTANEOUS at 21:14

## 2024-06-10 RX ADMIN — NITROGLYCERIN 70 MCG/MIN: 20 INJECTION INTRAVENOUS at 10:42

## 2024-06-10 RX ADMIN — ASPIRIN 81 MG CHEWABLE TABLET 324 MG: 81 TABLET CHEWABLE at 09:26

## 2024-06-10 RX ADMIN — ALPRAZOLAM 0.25 MG: 0.25 TABLET ORAL at 21:23

## 2024-06-10 RX ADMIN — METOCLOPRAMIDE 10 MG: 5 INJECTION, SOLUTION INTRAMUSCULAR; INTRAVENOUS at 19:46

## 2024-06-10 RX ADMIN — SODIUM CHLORIDE 500 ML: 0.9 INJECTION, SOLUTION INTRAVENOUS at 09:27

## 2024-06-10 RX ADMIN — NITROGLYCERIN 80 MCG/MIN: 20 INJECTION INTRAVENOUS at 11:08

## 2024-06-10 RX ADMIN — ATORVASTATIN CALCIUM 40 MG: 40 TABLET, FILM COATED ORAL at 16:50

## 2024-06-10 RX ADMIN — OXYCODONE HYDROCHLORIDE 30 MG: 10 TABLET ORAL at 17:12

## 2024-06-10 RX ADMIN — NITROGLYCERIN 0.4 MG: 0.4 TABLET SUBLINGUAL at 09:24

## 2024-06-10 RX ADMIN — TICAGRELOR 90 MG: 90 TABLET ORAL at 21:13

## 2024-06-10 RX ADMIN — NITROGLYCERIN 0.4 MG: 0.4 TABLET SUBLINGUAL at 09:33

## 2024-06-10 RX ADMIN — NITROGLYCERIN 0.4 MG: 0.4 TABLET SUBLINGUAL at 09:15

## 2024-06-10 RX ADMIN — BENZONATATE 100 MG: 100 CAPSULE ORAL at 19:26

## 2024-06-10 RX ADMIN — COLCHICINE 0.6 MG: 0.6 TABLET ORAL at 16:50

## 2024-06-10 RX ADMIN — NITROGLYCERIN 60 MCG/MIN: 20 INJECTION INTRAVENOUS at 10:12

## 2024-06-10 RX ADMIN — LIDOCAINE 5% 1 PATCH: 700 PATCH TOPICAL at 16:50

## 2024-06-10 RX ADMIN — INSULIN LISPRO 5 UNITS: 100 INJECTION, SOLUTION INTRAVENOUS; SUBCUTANEOUS at 15:27

## 2024-06-10 RX ADMIN — ISOSORBIDE MONONITRATE 30 MG: 30 TABLET, EXTENDED RELEASE ORAL at 17:12

## 2024-06-10 RX ADMIN — Medication 1 PATCH: at 14:58

## 2024-06-10 NOTE — ASSESSMENT & PLAN NOTE
Treated for NSTEMI with ASHVIN x 2 to the mid OM2 on 6/3/2024. Continue triple therapy with Xarelto, ASA & Plavix until 6/17/2024.  On 6/18 switch to dual therapy with Xarelto and Plavix.

## 2024-06-10 NOTE — ASSESSMENT & PLAN NOTE
Lab Results   Component Value Date    HGBA1C 9.5 (A) 01/04/2024     Continue Lantus 28 units at night  Add Humalog 5 units with meals and sliding scale

## 2024-06-10 NOTE — H&P
Former[facility]  H&P  Name: Gulshan Arias 63 y.o. male I MRN: [id number]  Unit/Bed#: ED-29 I Date of Admission: 6/10/2024   Date of Service: 6/10/2024  Hospital Day: 0      Assessment & Plan   * Other chest pain  Assessment & Plan  Recurrent chest pain after recent NSTEMI.  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 Benavides  Continue ASA, Brilinta, Xarelto statin and beta-blocker  Continue nitroglycerin infusion  Await formal cardiology evaluation.      NSTEMI (non-ST elevated myocardial infarction) (HCC)  Assessment & Plan  Recent admission for NSTEMI status post cardiac cath with drug-eluting stents placed to 99% OM 2 lesion.  His current troponin is trending down compared to a few days ago which is reassuring.  Continue triple therapy with aspirin Brilinta and Xarelto for 14 days  Switch simvastatin to Lipitor while in the hospital  Per DC summary, he was supposed to be on triple therapy for 14 days then dual therapy after  Emphasized tobacco cessation    Atrial fibrillation (HCC)  Assessment & Plan  Continue amiodarone 200 mg daily  Continue Xarelto for stroke prevention    Tobacco dependence  Assessment & Plan  Emphasized tobacco cessation.  Continue nicotine patch.    Type 2 diabetes mellitus without complication, with long-term current use of insulin (HCC)  Assessment & Plan  Lab Results   Component Value Date    HGBA1C 9.5 (A) 01/04/2024     Continue Lantus 28 units at night  Add Humalog 5 units with meals and sliding scale        Hyperlipidemia  Assessment & Plan  Switch simvastatin to Lipitor while in the hospital    Essential hypertension  Assessment & Plan  Currently on current infusion.  Continue losartan, HCTZ and Norvasc with hold parameters    Generalized anxiety  disorder  Assessment & Plan  Continue Paxil daily.  PDMP reviewed and confirmed use of Xanax         VTE Pharmacologic Prophylaxis: VTE Score: 3 Moderate Risk (Score 3-4) - Pharmacological DVT Prophylaxis Ordered: rivaroxaban (Xarelto).  Code Status: Prior full code   Discussion with family: Updated  (wife) at bedside.    Anticipated Length of Stay: Patient will be admitted on an inpatient basis with an anticipated length of stay of greater than 2 midnights secondary to chest pain.    Chief Complaint: chest pain    History of Present Illness:  Gulshan Arias is a 63 y.o. male with a PMH of CAD, DM2, HTN, atrial fibrillation and tobacco abuse who presents with chest pain.  He was just admitted here on June 2 to June 4, 2024 due to NSTEMI.  During that admission he had a cardiac catheterization which showed a 99% occluded mid OM 2 treated with 2 overlapping drug-eluting stents.  He was discharged in stable condition and was supposed to be taking aspirin, Brilinta and Xarelto, triple therapy for 14 days and then dual therapy after.  According to his wife he was not taking the aspirin already.  He also admitted that he continued to smoke since he was discharged.    He was having a normal day yesterday.  But smoked approximately 20 cigarettes.  At the end of the day while he was about to the sleep the chest pain started.  This was described as pressure and pinching in character.  This was 8/10 in intensity, persistent and lasted all night.  Was not able to sleep which prompted him to come to the ER today.    Review of Systems:  Review of Systems   Constitutional:  Negative for fever.   HENT: Negative.     Respiratory:  Positive for cough.    Cardiovascular:  Positive for chest pain.   Gastrointestinal: Negative.    Genitourinary: Negative.    Musculoskeletal: Negative.    Skin: Negative.    Neurological: Negative.    Psychiatric/Behavioral: Negative.     All other systems reviewed and are  negative.      Past Medical and Surgical History:   Past Medical History:   Diagnosis Date    Depression     Diabetes mellitus (HCC)     Hyperlipidemia     Hypertension     Tobacco abuse        Past Surgical History:   Procedure Laterality Date    APPENDECTOMY      SHOULDER ARTHROSCOPY         Meds/Allergies:  Prior to Admission medications    Medication Sig Start Date End Date Taking? Authorizing Provider   albuterol (PROVENTIL HFA,VENTOLIN HFA) 90 mcg/act inhaler Inhale 2 puffs every 6 (six) hours as needed for wheezing or shortness of breath 2/1/24  Yes Yahaira Hussein MD   ALPRAZolam (XANAX) 0.25 mg tablet TAKE 1 TABLET BY MOUTH TWICE DAILY AS NEEDED FOR ANXIETY 12/23/19  Yes Sheyla Dunn MD   amiodarone 200 mg tablet Take 2 tablets (400 mg total) by mouth 3 (three) times a day with meals for 4 days, THEN 1 tablet (200 mg total) daily for 26 days.  Patient taking differently: No sig reported 6/4/24 7/4/24 Yes JACOB Leahy   amLODIPine (NORVASC) 10 mg tablet Take 1 tablet (10 mg total) by mouth daily 1/4/24  Yes Yahaira Hussein MD   Insulin Glargine Solostar (Lantus SoloStar) 100 UNIT/ML SOPN Inject 0.28 mL (28 Units total) under the skin daily at bedtime 2/1/24  Yes Yahaira Hussein MD   losartan-hydrochlorothiazide (HYZAAR) 100-25 MG per tablet Take 1 tablet by mouth daily 1/4/24  Yes Yahaira Hussein MD   metFORMIN (GLUCOPHAGE) 1000 MG tablet Take 1 tablet (1,000 mg total) by mouth 2 (two) times a day with meals 1/4/24  Yes Yahaira Hussein MD   metoprolol succinate (TOPROL-XL) 50 mg 24 hr tablet Take 1 tablet (50 mg total) by mouth daily 6/4/24 7/4/24 Yes JACOB Leahy   oxyCODONE (ROXICODONE) 30 MG immediate release tablet  11/1/20  Yes Eliza Bryan MD   pantoprazole (PROTONIX) 40 mg tablet Take 1 tablet (40 mg total) by mouth daily 2/1/24  Yes Yahaira Hussein MD   PARoxetine (PAXIL) 20 mg tablet Take 1 tablet (20 mg total) by mouth daily 1/4/24  Yes Edla Hoffmann  MD Jovita   simvastatin (ZOCOR) 40 mg tablet Take 1 tablet (40 mg total) by mouth daily at bedtime 2/1/24  Yes Yahaira Hussein MD   ticagrelor (BRILINTA) 90 MG Take 1 tablet (90 mg total) by mouth every 12 (twelve) hours Do not start before June 5, 2024. 6/5/24  Yes JACOB Leahy   zolpidem (AMBIEN) 10 mg tablet Take 10 mg by mouth daily at bedtime as needed for sleep   Yes Historical Provider, MD   aspirin (ECOTRIN LOW STRENGTH) 81 mg EC tablet Take 1 tablet (81 mg total) by mouth daily for 14 days  Patient not taking: Reported on 6/10/2024 6/5/24 6/19/24  JACOB Leahy   dulaglutide (Trulicity) 0.75 MG/0.5ML injection Inject 0.5 mL (0.75 mg total) under the skin every 7 days  Patient not taking: Reported on 6/10/2024 2/1/24   Yahaira Hussein MD   fluticasone (Flovent HFA) 44 mcg/act inhaler Inhale 2 puffs 2 (two) times a day Rinse mouth after use.  Patient not taking: Reported on 6/10/2024 2/1/24   Yahaira Hussein MD   glucose blood test strip 1 each by Other route 2 (two) times a day Use as instructed 11/9/20   Alida Yee MD   Insulin Pen Needle (TechLite Pen Needles) 31G X 8 MM MISC Apply topically 4 (four) times a day 2/1/24 5/1/24  Yahaira Hussein MD   Lancets Misc. (Accu-Chek FastClix Lancet) KIT Inject under the skin 2 (two) times a day 2/1/24   Yahaira Hussein MD   nicotine (NICODERM CQ) 14 mg/24hr TD 24 hr patch Place 1 patch on the skin over 24 hours daily  Patient not taking: Reported on 6/10/2024 6/5/24   JACOB Leahy   rivaroxaban (Xarelto) 15 mg tablet Take 1 tablet (15 mg total) by mouth daily with breakfast 2/15/24 6/10/24  Ashok Sheppard, DO     I have reviewed home medications using recent Epic encounter.  Went over medications with wife and nurse    Allergies: No Known Allergies    Social History:  Marital Status: /Civil Union   Occupation: Retired  Patient Pre-hospital Living Situation: Home  Patient Pre-hospital Level of Mobility:  walks  Patient Pre-hospital Diet Restrictions: none  Substance Use History:   Social History     Substance and Sexual Activity   Alcohol Use No     Social History     Tobacco Use   Smoking Status Every Day    Current packs/day: 1.00    Average packs/day: 1 pack/day for 1.4 years (1.4 ttl pk-yrs)    Types: Cigarettes    Start date: 2023    Passive exposure: Current   Smokeless Tobacco Current     Social History     Substance and Sexual Activity   Drug Use No       Family History:  Family History   Problem Relation Age of Onset    Heart disease Father        Physical Exam:     Vitals:   Blood Pressure: 133/66 (06/10/24 1248)  Pulse: 68 (06/10/24 1248)  Temperature: 98.5 °F (36.9 °C) (06/10/24 0937)  Temp Source: Oral (06/10/24 0937)  Respirations: 18 (06/10/24 1230)  Weight - Scale: 86.2 kg (190 lb) (06/10/24 0854)  SpO2: 95 % (06/10/24 1230)    Physical Exam  Vitals reviewed.   Constitutional:       Appearance: He is not ill-appearing.   HENT:      Head: Normocephalic and atraumatic.      Nose: No congestion or rhinorrhea.   Eyes:      General: No scleral icterus.  Cardiovascular:      Rate and Rhythm: Normal rate. Rhythm irregular.      Heart sounds: No murmur heard.     No gallop.   Pulmonary:      Breath sounds: No wheezing, rhonchi or rales.   Abdominal:      Palpations: Abdomen is soft.      Tenderness: There is no abdominal tenderness. There is no guarding.   Musculoskeletal:      Right lower leg: No edema.      Left lower leg: No edema.   Skin:     General: Skin is warm and dry.   Neurological:      Mental Status: He is oriented to person, place, and time.   Psychiatric:         Mood and Affect: Mood normal.         Behavior: Behavior normal.        Additional Data:     Lab Results:  Results from last 7 days   Lab Units 06/10/24  0857   WBC Thousand/uL 9.54   HEMOGLOBIN g/dL 14.2   HEMATOCRIT % 43.2   PLATELETS Thousands/uL 301   SEGS PCT % 52   LYMPHO PCT % 37   MONO PCT % 8   EOS PCT % 2     Results from  last 7 days   Lab Units 06/10/24  0857   SODIUM mmol/L 138   POTASSIUM mmol/L 3.7   CHLORIDE mmol/L 101   CO2 mmol/L 29   BUN mg/dL 23   CREATININE mg/dL 1.31*   ANION GAP mmol/L 8   CALCIUM mg/dL 9.8   ALBUMIN g/dL 4.6   TOTAL BILIRUBIN mg/dL 0.43   ALK PHOS U/L 74   ALT U/L 15   AST U/L 13   GLUCOSE RANDOM mg/dL 225*         Results from last 7 days   Lab Units 06/04/24  1111 06/04/24  0732 06/03/24  2114 06/03/24  1557   POC GLUCOSE mg/dl 256* 137 93 351*               Lines/Drains:  Invasive Devices       Peripheral Intravenous Line  Duration             Peripheral IV 06/10/24 Dorsal (posterior);Right Hand <1 day    Peripheral IV 06/10/24 Right;Ventral (anterior) Forearm <1 day                        Imaging: Reviewed radiology reports from this admission including: chest xray  XR chest 1 view portable   ED Interpretation by Ana Rosa Buckner DO (06/10 0916)   Xray reviewed and independently interpreted by me: no acute findings        Final Result by Mumtaz Zhong MD (06/10 5967)      No acute cardiopulmonary disease.            Workstation performed: [id number]             EKG and Other Studies Reviewed on Admission:   EKG: Atrial fibrillation    ** Please Note: This note has been constructed using a voice recognition system. **

## 2024-06-10 NOTE — PLAN OF CARE
Problem: CARDIOVASCULAR - ADULT  Goal: Maintains optimal cardiac output and hemodynamic stability  Description: INTERVENTIONS:  - Monitor I/O, vital signs and rhythm  - Monitor for S/S and trends of decreased cardiac output  - Administer and titrate ordered vasoactive medications to optimize hemodynamic stability  - Assess quality of pulses, skin color and temperature  - Assess for signs of decreased coronary artery perfusion  - Instruct patient to report change in severity of symptoms  Outcome: Progressing     Problem: GASTROINTESTINAL - ADULT  Goal: Minimal or absence of nausea and/or vomiting  Description: INTERVENTIONS:  - Administer IV fluids if ordered to ensure adequate hydration  - Maintain NPO status until nausea and vomiting are resolved  - Nasogastric tube if ordered  - Administer ordered antiemetic medications as needed  - Provide nonpharmacologic comfort measures as appropriate  - Advance diet as tolerated, if ordered  - Consider nutrition services referral to assist patient with adequate nutrition and appropriate food choices  Outcome: Progressing     Problem: PAIN - ADULT  Goal: Verbalizes/displays adequate comfort level or baseline comfort level  Description: Interventions:  - Encourage patient to monitor pain and request assistance  - Assess pain using appropriate pain scale  - Administer analgesics based on type and severity of pain and evaluate response  - Implement non-pharmacological measures as appropriate and evaluate response  - Consider cultural and social influences on pain and pain management  - Notify physician/advanced practitioner if interventions unsuccessful or patient reports new pain  Outcome: Progressing     Problem: Knowledge Deficit  Goal: Patient/family/caregiver demonstrates understanding of disease process, treatment plan, medications, and discharge instructions  Description: Complete learning assessment and assess knowledge base.  Interventions:  - Provide teaching at level  of understanding  - Provide teaching via preferred learning methods  Outcome: Progressing

## 2024-06-10 NOTE — ASSESSMENT & PLAN NOTE
Recent admission for NSTEMI status post cardiac cath with drug-eluting stents placed to 99% OM 2 lesion.  His current troponin is trending down compared to a few days ago which is reassuring.  Continue triple therapy with aspirin Brilinta and Xarelto for 14 days  Switch simvastatin to Lipitor while in the hospital  Per DC summary, he was supposed to be on triple therapy for 14 days then dual therapy after  Emphasized tobacco cessation

## 2024-06-10 NOTE — ED NOTES
Pt is still reporting sharp chest pain. Pt reports is not as intense.      Tere Rivas RN  06/10/24 [id number]

## 2024-06-10 NOTE — ASSESSMENT & PLAN NOTE
Presents with chest pain x 24 hours. In the context of recent NSTEMI with ASHVIN.  EKG this admission no changes from prior. Evidence for inferolateral infarct. No acute ischemia today on EKG.

## 2024-06-10 NOTE — ED NOTES
Pt continues to report CP at this time. Provider aware.      Tere Rivas, FRANCESCA  06/10/24 1000

## 2024-06-10 NOTE — ED NOTES
Pt reports having CP at this moment. Pt reports the pain is intermittent at this time.      Tere Rivas RN  06/10/24 3560

## 2024-06-10 NOTE — PLAN OF CARE
Problem: Potential for Falls  Goal: Patient will remain free of falls  Description: INTERVENTIONS:  - Educate patient/family on patient safety including physical limitations  - Instruct patient to call for assistance with activity   - Consult OT/PT to assist with strengthening/mobility   - Keep Call bell within reach  - Keep bed low and locked with side rails adjusted as appropriate  - Keep care items and personal belongings within reach  - Initiate and maintain comfort rounds  - Make Fall Risk Sign visible to staff  - Offer Toileting every 2 Hours, in advance of need  - Initiate/Maintain alarm  - Obtain necessary fall risk management equipment:   - Apply yellow socks and bracelet for high fall risk patients  - Consider moving patient to room near nurses station  Outcome: Progressing     Problem: CARDIOVASCULAR - ADULT  Goal: Maintains optimal cardiac output and hemodynamic stability  Description: INTERVENTIONS:  - Monitor I/O, vital signs and rhythm  - Monitor for S/S and trends of decreased cardiac output  - Administer and titrate ordered vasoactive medications to optimize hemodynamic stability  - Assess quality of pulses, skin color and temperature  - Assess for signs of decreased coronary artery perfusion  - Instruct patient to report change in severity of symptoms  Outcome: Progressing     Problem: PAIN - ADULT  Goal: Verbalizes/displays adequate comfort level or baseline comfort level  Description: Interventions:  - Encourage patient to monitor pain and request assistance  - Assess pain using appropriate pain scale  - Administer analgesics based on type and severity of pain and evaluate response  - Implement non-pharmacological measures as appropriate and evaluate response  - Consider cultural and social influences on pain and pain management  - Notify physician/advanced practitioner if interventions unsuccessful or patient reports new pain  Outcome: Progressing     Problem: Knowledge Deficit  Goal:  Patient/family/caregiver demonstrates understanding of disease process, treatment plan, medications, and discharge instructions  Description: Complete learning assessment and assess knowledge base.  Interventions:  - Provide teaching at level of understanding  - Provide teaching via preferred learning methods  Outcome: Progressing

## 2024-06-10 NOTE — CONSULTS
[facility]  Consult Note  Name: Gulshan Arias I  MRN: [id number]  Unit/Bed#: ED-29 I Date of Admission: 6/10/2024   Date of Service: 6/10/2024 I Hospital Day: 0    Assessment & Plan   * Pre-cordial chest pain  Presents with chest pain x 24 hours. In the context of recent NSTEMI with ASHVIN 1 week ago.  EKG this admission no changes from prior. Evidence for inferolateral infarct. No acute ischemia today on EKG.  Doubt ACS.   He is complaining of viral prodrome and his wife was sick recently. Also the abdomen and left chest are exquisitely tender to palpation. Treat for MSK +/- viral illness.  Consider CXR PA & Lateral in the next few days to r/o early viral PNA.  Possible Post-cardiac injury syndrome? Start colchicine 0.5 mg BID. Consider adding PPI during NSAID treatment period to prevent PUD.  Can stop nitro gtt and monitor sxs.  Can start imdur 30 mg QD.    Recent NSTEMI Type 1  Treated for NSTEMI with ASHVIN x 2 to the mid OM2 on 6/3/2024. At that time troponins were 10,000. This time trops trended [phone]  Continue triple therapy with Xarelto 15 mg QD, ASA 81 mg QD & Brilinta 90 mg q12h until 6/17/2024.  On 6/18 switch to dual therapy with Xarelto and Brilinta 90 mg q12h.    Paroxsymal atrial fibrillation (HCC)  Continue Amiodarone 200 mg QD, Toprol 50 mg QD & Xarelto 15 mg QD  Patient has an appt with Dr. Carlitos Lakhani scheduled for August to discuss ablation vs alternate antiarrhythmic drug.  Sleep medicine referral ordered in the dc tab. I will message their clerical staff to please set up a sleep study.    Persistent HFrEF, LVEF 45%, LVIDd 4.5 cm, NYHA Class I, AHA Stage C, etiology is ICM, other risk factors for NICM are untreated RHIANNA & Afib       Compensated at 190#.   Neurohormonal Blockade:  --Beta Blocker: Toprol 50 mg QD  --ARNi / ACEi / ARB: Losartan-HCTZ 100-25 mg QD  --Aldosterone Antagonist: None. Can consider adding this admission depending on his BP  tomorrow.  --SGLT2 Inhibitor: Jardiance too expensive $141/mo.  --Home Diuretic: HCTZ as above.    Essential hypertension  Resume home antihypertensives Toprol 50 mg QD, Norvasc 10 mg QD & Losartan-HCTZ 100-25 mg QD    History Of Present Illness:  Gulshan Arias is a 63 year old with PMH as below who presented to [facility] ED today from home complaining of chest pressure since last night.  Patient reports he has been having left-sided chest discomfort which happened 1 time on Thursday when he was walking around his yard picking up leaves.  He has been using ibuprofen this week to try to help his chest discomfort.  Patient was not taking aspirin 81 mg as prescribed.  Otherwise he was taking all of his medications every day.  Patient reports yesterday he went to the market to buy some fruits and vegetables.  No exertional symptoms in the market. He had chest pressure on the left side of his chest starting yesterday evening which kept him up all night.  Described as a pressure with twinges which would come and go.  Tried ibuprofen last night without relief. The CP was also associated with shortness of breath.  Patient endorses cold sweat and nausea with vomiting 1 time last night.  He has been coughing a lot with excessive mucus production.  He says the vomiting happened after strong coughing episode.  Patient reports palpitations when he is climbing the steps in his house.  Otherwise no strong heartbeat or fluttering in his chest.  Patient denies headache, abdominal distention, leg swelling or change in appetite.  When he came to the emergency room for this chest pressure he was started on nitro drip which she states helps the pain but it is still present.  He is grimacing about once every 5 minutes during my encounter due to random chest pain. He has still be smoking 1 PPD.    Rhythm History: Atrial flutter, atrial fibrillation, sinus rhythm, PVC, prolonged QT, PAC, aberrancy    Primary Cardiologist:  None    ROS:  Review of Systems   Constitutional:  Positive for diaphoresis and fatigue.   HENT:  Positive for sore throat. Negative for congestion, sinus pressure and sinus pain.    Respiratory:  Positive for cough and shortness of breath.    Cardiovascular:  Positive for chest pain and palpitations. Negative for leg swelling.   Gastrointestinal:  Positive for abdominal pain, nausea and vomiting.   Musculoskeletal:  Positive for myalgias. Negative for back pain.        Pt reports myalgias all over like he is starting to get the flu   Neurological:  Negative for dizziness, light-headedness and headaches.      Past Medical History:        Past Medical History:   Diagnosis Date    Depression     Diabetes mellitus (HCC)     Hyperlipidemia     Hypertension     Tobacco abuse       Past Surgical History:   Procedure Laterality Date    APPENDECTOMY      SHOULDER ARTHROSCOPY       Family History:     Family History   Problem Relation Age of Onset    Heart disease Father      Social History:       Smoker: yes, still smoking 1 ppd despite getting cardiac stent 1 week ago  Alcohol: none  Drugs: none  Living situation: home w wife    Allergy:        No Known Allergies    Medications:       Prior to Admission medications    Medication Sig Start Date End Date Taking? Authorizing Provider   albuterol (PROVENTIL HFA,VENTOLIN HFA) 90 mcg/act inhaler Inhale 2 puffs every 6 (six) hours as needed for wheezing or shortness of breath 2/1/24  Yes Yahaira Hussein MD   ALPRAZolam (XANAX) 0.25 mg tablet TAKE 1 TABLET BY MOUTH TWICE DAILY AS NEEDED FOR ANXIETY 12/23/19  Yes Sheyla Dunn MD   amiodarone 200 mg tablet Take 2 tablets (400 mg total) by mouth 3 (three) times a day with meals for 4 days, THEN 1 tablet (200 mg total) daily for 26 days.  Patient taking differently: No sig reported 6/4/24 7/4/24 Yes JACOB Leahy   amLODIPine (NORVASC) 10 mg tablet Take 1 tablet (10 mg total) by mouth daily 1/4/24  Yes Yahaira Hoffmann  MD Jovita   Insulin Glargine Solostar (Lantus SoloStar) 100 UNIT/ML SOPN Inject 0.28 mL (28 Units total) under the skin daily at bedtime 2/1/24  Yes Yahaira Hussein MD   losartan-hydrochlorothiazide (HYZAAR) 100-25 MG per tablet Take 1 tablet by mouth daily 1/4/24  Yes Yahaira Hussein MD   metFORMIN (GLUCOPHAGE) 1000 MG tablet Take 1 tablet (1,000 mg total) by mouth 2 (two) times a day with meals 1/4/24  Yes Yahaira Hussien MD   metoprolol succinate (TOPROL-XL) 50 mg 24 hr tablet Take 1 tablet (50 mg total) by mouth daily 6/4/24 7/4/24 Yes JACOB Leahy   oxyCODONE (ROXICODONE) 30 MG immediate release tablet  11/1/20  Yes Historical Provider, MD   pantoprazole (PROTONIX) 40 mg tablet Take 1 tablet (40 mg total) by mouth daily 2/1/24  Yes Yahaira Hussein MD   PARoxetine (PAXIL) 20 mg tablet Take 1 tablet (20 mg total) by mouth daily 1/4/24  Yes Yahaira Hussein MD   simvastatin (ZOCOR) 40 mg tablet Take 1 tablet (40 mg total) by mouth daily at bedtime 2/1/24  Yes Yahaira Hussein MD   ticagrelor (BRILINTA) 90 MG Take 1 tablet (90 mg total) by mouth every 12 (twelve) hours Do not start before June 5, 2024. 6/5/24  Yes JACOB Leahy   zolpidem (AMBIEN) 10 mg tablet Take 10 mg by mouth daily at bedtime as needed for sleep   Yes Historical Provider, MD   aspirin (ECOTRIN LOW STRENGTH) 81 mg EC tablet Take 1 tablet (81 mg total) by mouth daily for 14 days  Patient not taking: Reported on 6/10/2024 6/5/24 6/19/24  JACOB Leahy   dulaglutide (Trulicity) 0.75 MG/0.5ML injection Inject 0.5 mL (0.75 mg total) under the skin every 7 days  Patient not taking: Reported on 6/10/2024 2/1/24   Yahaira Hussein MD   fluticasone (Flovent HFA) 44 mcg/act inhaler Inhale 2 puffs 2 (two) times a day Rinse mouth after use.  Patient not taking: Reported on 6/10/2024 2/1/24   Yahaira Hussein MD   glucose blood test strip 1 each by Other route 2 (two) times a day Use as instructed 11/9/20   Alida  MD Nakia   Insulin Pen Needle (TechLite Pen Needles) 31G X 8 MM MISC Apply topically 4 (four) times a day 2/1/24 5/1/24  Yahaira Hussein MD   Lancets Misc. (Accu-Chek FastClix Lancet) KIT Inject under the skin 2 (two) times a day 2/1/24   Yahaira Hussein MD   nicotine (NICODERM CQ) 14 mg/24hr TD 24 hr patch Place 1 patch on the skin over 24 hours daily  Patient not taking: Reported on 6/10/2024 6/5/24   JACOB Leahy   rivaroxaban (Xarelto) 15 mg tablet Take 1 tablet (15 mg total) by mouth daily with breakfast 2/15/24 6/10/24  Ashok Sheppard,      Vitals:    06/10/24 1315 06/10/24 1322 06/10/24 1400 06/10/24 1441   BP: 135/64 149/71 146/72 128/75   BP Location:    Left arm   Pulse: 60 57 58 65   Resp: 16  18 18   Temp:       TempSrc:       SpO2: 96%  96% 96%   Weight:         Exam:  Physical Exam  Vitals and nursing note reviewed.   Constitutional:       Appearance: He is obese. He is ill-appearing. He is not diaphoretic.   HENT:      Head: Normocephalic and atraumatic.      Nose: Nose normal. No congestion or rhinorrhea.      Mouth/Throat:      Mouth: Mucous membranes are dry.   Eyes:      Conjunctiva/sclera: Conjunctivae normal.   Neck:      Comments: + JVD  Cardiovascular:      Rate and Rhythm: Regular rhythm.      Pulses:           Radial pulses are 2+ on the right side and 2+ on the left side.      Heart sounds: S1 normal and S2 normal. No murmur heard.     Comments: Hr approx 60 bpm  Pulmonary:      Effort: Pulmonary effort is normal.      Breath sounds: No decreased breath sounds, wheezing, rhonchi or rales.   Abdominal:      General: Abdomen is flat.      Palpations: Abdomen is soft.       Musculoskeletal:         General: No swelling. Normal range of motion.   Skin:     General: Skin is warm and dry.      Comments: Right wrist no ecchymosis or hematoma from prior cath last week   Neurological:      General: No focal deficit present.      Mental Status: He is alert.   Psychiatric:          Behavior: Behavior normal.      DATA:      Imaging: CXR today no acute cardiopulmonary disease    ECG: Today EKG With sinus rhythm, evidence for inferolateral infarct no acute ischemia.    Holter: March 2024 43% burden AF with average HR 98 bpm by ZIO    Echocardiogram: 6/4/2024 moderate LVH.  LVEF 45%.  Mild global hypokinesis with severe hypokinesis of the mid-basal anterolateral myocardial wall segments.  Grade 2 diastolic dysfunction.  Mild AI.  Mild MR.    Ischemic Testing:  Stress test: 2/21/2024 Lexiscan showed No perfusion defects.       Catheterization: 6/3/2024 99% occlusion of the mid OM2. Successful PCI with placement of 2 overlapping 2.5 mm ASHVIN's.    Weights:    Wt Readings from Last 10 Encounters:   06/10/24 86.2 kg (190 lb)   06/04/24 86.2 kg (190 lb)   03/27/24 79.8 kg (176 lb)   02/21/24 79.8 kg (176 lb)   02/15/24 80 kg (176 lb 6.4 oz)   02/01/24 83 kg (183 lb)   01/04/24 81.6 kg (180 lb)   04/27/23 81.6 kg (180 lb)   02/06/22 82.2 kg (181 lb 3.5 oz)   11/15/21 81.2 kg (179 lb 1.6 oz)     Lab Studies:        Results from last 7 days   Lab Units 06/10/24  0857 06/04/24  0528   WBC Thousand/uL 9.54 11.85*   HEMOGLOBIN g/dL 14.2 13.2   HEMATOCRIT % 43.2 39.3   PLATELETS Thousands/uL 301 208   ,   Results from last 7 days   Lab Units 06/10/24  0857 06/04/24  0528   POTASSIUM mmol/L 3.7 3.6   CHLORIDE mmol/L 101 101   CO2 mmol/L 29 28   BUN mg/dL 23 12   CREATININE mg/dL 1.31* 1.00   CALCIUM mg/dL 9.8 8.9   ALK PHOS U/L 74  --    ALT U/L 15  --    AST U/L 13  --      Trop [phone]    Mariely Dasilva PA-C

## 2024-06-10 NOTE — ASSESSMENT & PLAN NOTE
Recurrent chest pain after recent NSTEMI.  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 Benavides  Continue ASA, Brilinta, Xarelto statin and beta-blocker  Continue nitroglycerin infusion  Await formal cardiology evaluation.

## 2024-06-10 NOTE — ED PROVIDER NOTES
History  Chief Complaint   Patient presents with    Chest Pain     Pt reports left sided constant chest pain since last night. Pt reports at time he feels stabs to same area. REports some SOB. Pt had cardiac cath done with two stent placement on Monday of last week.        63y M here for evaluation of chest pain.  had cardiac cath w/ stentx2 6/3/24.  started w/ sharp left sided chest pain last night that has continued/worsened this am.  pain is constant, relatively sharp with increased episodes of sharper pain.  Pain is non-radiating, assoc w/ nausea, diaphoresis, dyspnea, palpitations and reports it feels similar to recent MI.  Pt reports compliance w/ home medications including brilinta.      History provided by:  Patient   used: No    Chest Pain      Prior to Admission Medications   Prescriptions Last Dose Informant Patient Reported? Taking?   ALPRAZolam (XANAX) 0.25 mg tablet  Spouse/Significant Other No Yes   Sig: TAKE 1 TABLET BY MOUTH TWICE DAILY AS NEEDED FOR ANXIETY   Insulin Glargine Solostar (Lantus SoloStar) 100 UNIT/ML SOPN  Spouse/Significant Other No Yes   Sig: Inject 0.28 mL (28 Units total) under the skin daily at bedtime   Insulin Pen Needle (TechLite Pen Needles) 31G X 8 MM MISC  Spouse/Significant Other No No   Sig: Apply topically 4 (four) times a day   Lancets Misc. (Accu-Chek FastClix Lancet) KIT  Spouse/Significant Other No No   Sig: Inject under the skin 2 (two) times a day   PARoxetine (PAXIL) 20 mg tablet  Spouse/Significant Other No Yes   Sig: Take 1 tablet (20 mg total) by mouth daily   albuterol (PROVENTIL HFA,VENTOLIN HFA) 90 mcg/act inhaler  Spouse/Significant Other No Yes   Sig: Inhale 2 puffs every 6 (six) hours as needed for wheezing or shortness of breath   amLODIPine (NORVASC) 10 mg tablet  Spouse/Significant Other No Yes   Sig: Take 1 tablet (10 mg total) by mouth daily   amiodarone 200 mg tablet   No Yes   Sig: Take 2 tablets (400 mg total) by mouth 3  (three) times a day with meals for 4 days, THEN 1 tablet (200 mg total) daily for 26 days.   Patient taking differently: No sig reported   aspirin (ECOTRIN LOW STRENGTH) 81 mg EC tablet Not Taking  No No   Sig: Take 1 tablet (81 mg total) by mouth daily for 14 days   Patient not taking: Reported on 6/10/2024   dulaglutide (Trulicity) 0.75 MG/0.5ML injection Not Taking Spouse/Significant Other No No   Sig: Inject 0.5 mL (0.75 mg total) under the skin every 7 days   Patient not taking: Reported on 6/10/2024   fluticasone (Flovent HFA) 44 mcg/act inhaler Not Taking Spouse/Significant Other No No   Sig: Inhale 2 puffs 2 (two) times a day Rinse mouth after use.   Patient not taking: Reported on 6/10/2024   glucose blood test strip  Spouse/Significant Other No No   Si each by Other route 2 (two) times a day Use as instructed   losartan-hydrochlorothiazide (HYZAAR) 100-25 MG per tablet  Spouse/Significant Other No Yes   Sig: Take 1 tablet by mouth daily   metFORMIN (GLUCOPHAGE) 1000 MG tablet  Spouse/Significant Other No Yes   Sig: Take 1 tablet (1,000 mg total) by mouth 2 (two) times a day with meals   metoprolol succinate (TOPROL-XL) 50 mg 24 hr tablet   No Yes   Sig: Take 1 tablet (50 mg total) by mouth daily   nicotine (NICODERM CQ) 14 mg/24hr TD 24 hr patch Not Taking  No No   Sig: Place 1 patch on the skin over 24 hours daily   Patient not taking: Reported on 6/10/2024   oxyCODONE (ROXICODONE) 30 MG immediate release tablet  Spouse/Significant Other Yes Yes   pantoprazole (PROTONIX) 40 mg tablet  Spouse/Significant Other No Yes   Sig: Take 1 tablet (40 mg total) by mouth daily   simvastatin (ZOCOR) 40 mg tablet  Spouse/Significant Other No Yes   Sig: Take 1 tablet (40 mg total) by mouth daily at bedtime   ticagrelor (BRILINTA) 90 MG   No Yes   Sig: Take 1 tablet (90 mg total) by mouth every 12 (twelve) hours Do not start before 2024.   zolpidem (AMBIEN) 10 mg tablet  Spouse/Significant Other Yes Yes    Sig: Take 10 mg by mouth daily at bedtime as needed for sleep      Facility-Administered Medications: None       Past Medical History:   Diagnosis Date    Depression     Diabetes mellitus (HCC)     Hyperlipidemia     Hypertension     Tobacco abuse        Past Surgical History:   Procedure Laterality Date    APPENDECTOMY      SHOULDER ARTHROSCOPY         History reviewed. No pertinent family history.  I have reviewed and agree with the history as documented.    E-Cigarette/Vaping    E-Cigarette Use Never User      E-Cigarette/Vaping Substances    Nicotine Yes      Social History     Tobacco Use    Smoking status: Every Day     Current packs/day: 1.00     Average packs/day: 1 pack/day for 1.4 years (1.4 ttl pk-yrs)     Types: Cigarettes     Start date: 2023     Passive exposure: Current    Smokeless tobacco: Current   Vaping Use    Vaping status: Never Used   Substance Use Topics    Alcohol use: No    Drug use: No       Review of Systems   Cardiovascular:  Positive for chest pain.   All other systems reviewed and are negative.      Physical Exam  Physical Exam  Vitals and nursing note reviewed.   Constitutional:       General: He is not in acute distress.     Appearance: Normal appearance. He is not ill-appearing, toxic-appearing or diaphoretic.      Comments: Appear anxious   HENT:      Nose: Nose normal.      Mouth/Throat:      Mouth: Mucous membranes are moist.   Eyes:      Conjunctiva/sclera: Conjunctivae normal.   Cardiovascular:      Rate and Rhythm: Normal rate. Rhythm irregular.      Heart sounds: No murmur heard.  Pulmonary:      Effort: Pulmonary effort is normal.      Breath sounds: Normal breath sounds.   Abdominal:      Palpations: Abdomen is soft.   Musculoskeletal:         General: No swelling or tenderness.      Cervical back: Normal range of motion.   Skin:     General: Skin is warm.      Capillary Refill: Capillary refill takes less than 2 seconds.   Neurological:      General: No focal deficit  present.      Mental Status: He is alert and oriented to person, place, and time.   Psychiatric:         Mood and Affect: Mood is anxious.         Vital Signs  ED Triage Vitals   Temperature Pulse Respirations Blood Pressure SpO2   06/10/24 0937 06/10/24 0854 06/10/24 0854 06/10/24 0854 06/10/24 0857   98.5 °F (36.9 °C) 78 20 126/84 99 %      Temp Source Heart Rate Source Patient Position - Orthostatic VS BP Location FiO2 (%)   06/10/24 0937 06/10/24 0854 06/10/24 0854 06/10/24 0854 --   Oral Monitor Lying Left arm       Pain Score       06/10/24 0854       8           Vitals:    06/10/24 1204 06/10/24 1217 06/10/24 1230 06/10/24 1248   BP: 132/63 139/62 116/63 133/66   Pulse: 73 60 62 68   Patient Position - Orthostatic VS:   Lying          Visual Acuity      ED Medications  Medications   nitroglycerin (NITROSTAT) SL tablet 0.4 mg (0.4 mg Sublingual Given 6/10/24 0933)   nitroGLYcerin (TRIDIL) 50 mg in 250 mL infusion (premix) (120 mcg/min Intravenous Rate/Dose Change 6/10/24 1248)   aspirin chewable tablet 324 mg (324 mg Oral Given 6/10/24 0926)   sodium chloride 0.9 % bolus 500 mL (0 mL Intravenous Stopped 6/10/24 1059)   morphine injection 4 mg (4 mg Intravenous Given 6/10/24 1143)       Diagnostic Studies  Results Reviewed       Procedure Component Value Units Date/Time    HS Troponin I 2hr [[id number]]  (Abnormal) Collected: 06/10/24 1107    Lab Status: Final result Specimen: Blood from Arm, Left Updated: 06/10/24 1136     hs TnI 2hr 833 ng/L      Delta 2hr hsTnI -211 ng/L     HS Troponin I 4hr [[id number]]     Lab Status: No result Specimen: Blood     HS Troponin 0hr (reflex protocol) [[id number]]  (Abnormal) Collected: 06/10/24 0857    Lab Status: Final result Specimen: Blood from Arm, Right Updated: 06/10/24 0926     hs TnI 0hr 1,044 ng/L     Comprehensive metabolic panel [[id number]]  (Abnormal) Collected: 06/10/24 0857    Lab Status: Final result Specimen: Blood from Arm, Right Updated: 06/10/24 0922      Sodium 138 mmol/L      Potassium 3.7 mmol/L      Chloride 101 mmol/L      CO2 29 mmol/L      ANION GAP 8 mmol/L      BUN 23 mg/dL      Creatinine 1.31 mg/dL      Glucose 225 mg/dL      Calcium 9.8 mg/dL      AST 13 U/L      ALT 15 U/L      Alkaline Phosphatase 74 U/L      Total Protein 8.3 g/dL      Albumin 4.6 g/dL      Total Bilirubin 0.43 mg/dL      eGFR 57 ml/min/1.73sq m     Narrative:      National Kidney Disease Foundation guidelines for Chronic Kidney Disease (CKD):     Stage 1 with normal or high GFR (GFR > 90 mL/min/1.73 square meters)    Stage 2 Mild CKD (GFR = 60-89 mL/min/1.73 square meters)    Stage 3A Moderate CKD (GFR = 45-59 mL/min/1.73 square meters)    Stage 3B Moderate CKD (GFR = 30-44 mL/min/1.73 square meters)    Stage 4 Severe CKD (GFR = 15-29 mL/min/1.73 square meters)    Stage 5 End Stage CKD (GFR <15 mL/min/1.73 square meters)  Note: GFR calculation is accurate only with a steady state creatinine    CBC and differential [[id number]] Collected: 06/10/24 0857    Lab Status: Final result Specimen: Blood from Arm, Right Updated: 06/10/24 0903     WBC 9.54 Thousand/uL      RBC 4.68 Million/uL      Hemoglobin 14.2 g/dL      Hematocrit 43.2 %      MCV 92 fL      MCH 30.3 pg      MCHC 32.9 g/dL      RDW 12.5 %      MPV 10.2 fL      Platelets 301 Thousands/uL      nRBC 0 /100 WBCs      Segmented % 52 %      Immature Grans % 0 %      Lymphocytes % 37 %      Monocytes % 8 %      Eosinophils Relative 2 %      Basophils Relative 1 %      Absolute Neutrophils 4.92 Thousands/µL      Absolute Immature Grans 0.03 Thousand/uL      Absolute Lymphocytes 3.52 Thousands/µL      Absolute Monocytes 0.80 Thousand/µL      Eosinophils Absolute 0.20 Thousand/µL      Basophils Absolute 0.07 Thousands/µL                    XR chest 1 view portable   ED Interpretation by Ana Rosa Buckner DO (06/10 0947)   Xray reviewed and independently interpreted by me: no acute findings        Final Result by Mumtaz Zhong MD (06/10  1055)      No acute cardiopulmonary disease.            Workstation performed: [id number]                    Procedures  ECG 12 Lead Documentation Only    Date/Time: 6/10/2024 8:55 AM    Performed by: Ana Rosa Buckner DO  Authorized by: Ana Rosa Buckner DO    Indications / Diagnosis:  88  ECG reviewed by me, the ED Provider: yes    Patient location:  ED  Previous ECG:     Previous ECG:  Compared to current    Similarity:  Changes noted  Interpretation:     Interpretation: abnormal    Quality:     Tracing quality:  Limited by artifact  Rate:     ECG rate:  88    ECG rate assessment: normal    Rhythm:     Rhythm: atrial fibrillation and atrial flutter    QRS:     QRS axis:  Left  ST segments:     ST segments:  Non-specific  T waves:     T waves: non-specific    ECG 12 Lead Documentation Only    Date/Time: 6/10/2024 9:25 AM    Performed by: Ana Rosa Buckner DO  Authorized by: Ana Rosa Buckner DO    Indications / Diagnosis:  Ongoing pain  ECG reviewed by me, the ED Provider: yes    Patient location:  ED  Previous ECG:     Previous ECG:  Compared to current    Similarity:  No change  Interpretation:     Interpretation: abnormal    Rate:     ECG rate:  72    ECG rate assessment: normal    Rhythm:     Rhythm: atrial fibrillation and atrial flutter    QRS:     QRS axis:  Normal  ST segments:     ST segments:  Non-specific  T waves:     T waves: non-specific    ECG 12 Lead Documentation Only    Date/Time: 6/10/2024 9:55 AM    Performed by: Ana Rosa Buckner DO  Authorized by: Ana Rosa Buckner DO    Indications / Diagnosis:  Ongoing pain  ECG reviewed by me, the ED Provider: yes    Patient location:  ED  Previous ECG:     Previous ECG:  Compared to current    Similarity:  No change  Interpretation:     Interpretation: abnormal    Rate:     ECG rate:  68    ECG rate assessment: normal    Rhythm:     Rhythm: atrial fibrillation and atrial flutter    QRS:     QRS axis:  Normal  ST segments:     ST segments:   Non-specific  T waves:     T waves: non-specific             ED Course  ED Course as of 06/10/24 1259   Mon Lam 10, 2024   0935 Creatinine(!): 1.31  1.0 six days ago   0940 hs TnI 0hr(!): 1,044  Message sent to cardiology to discuss    Delta due at 1100   0940 Pain improved w/ asa/ntg   0955 Repeat EKG w/ no interval development of any ST elevations.    Pt has had SL NTG x3 and reports improvement of pain, but still having pain.   Given elevated trop, will order ntg gtt    Message sent to [employer] to update   1020 D/w Dr. Benavides, cards.  The 909 trop was the initial in the ED - peaked at 16K inpatient.    Recommends continuing w/ current tx.  If delta increases, would recommend adding heparin at that time   1113 Pt still w/ some chest pain, nitro gtt at 80mcg/min. Will give a dose of morphine for additional pain relief.    Delta trop pending   1142 Delta 2hr hsTnI: -211  Improved. Will contact [facility] for SDU                                              Medical Decision Making  Recent cardiac cath w/ stents.  CP reportedly the same a prior to cath/stents which would raise concern for additional ACS, stent clot/occlusion, pericarditis, also small ptx. Doubt pna, PE, dissection.  ill get EKG to r/o arrhythmia, ischemic changes.  Will get labs to r/o acute life threatening metabolic abnl, cardiac ischemia, significant anemia.  Will get CXR to r/o occult pna/ptx.      Pt hasn't take ASA yet this am so will give 324mg asa, in addition to SL NTG. Will get serial EKG q30 minutes x3 while pt is still having pain. Will likely d/w cardiology    Problems Addressed:  CAD (coronary artery disease): chronic illness or injury  Chest pain: acute illness or injury that poses a threat to life or bodily functions    Amount and/or Complexity of Data Reviewed  Independent Historian: spouse  External Data Reviewed: notes.     Details: Recent admission  Labs: ordered. Decision-making details documented in ED Course.  Radiology: ordered and  independent interpretation performed.  ECG/medicine tests: ordered and independent interpretation performed.    Risk  OTC drugs.  Prescription drug management.  Decision regarding hospitalization.             Disposition  Final diagnoses:   Chest pain   CAD (coronary artery disease)     Time reflects when diagnosis was documented in both MDM as applicable and the Disposition within this note       Time User Action Codes Description Comment    6/10/2024 10:02 AM Ana Rosa Buckner Add [I21.4] NSTEMI (non-ST elevated myocardial infarction) ([facility])     6/10/2024 10:10 AM Ana Rosa Buckner Remove [I21.4] NSTEMI (non-ST elevated myocardial infarction) ([facility])     6/10/2024 11:43 AM Ana Rosa Buckner Add [R07.9] Chest pain     6/10/2024 11:44 AM Ana Rosa Buckner Add [I25.10] CAD (coronary artery disease)           ED Disposition       ED Disposition   Admit    Condition   Stable    Date/Time   Mon Lam 10, 2024  9:59 AM    Comment   Case was discussed with  and the patient's admission status was agreed to be  to the service of   .               Follow-up Information    None         Patient's Medications   Discharge Prescriptions    No medications on file       No discharge procedures on file.    PDMP Review         Value Time User    PDMP Reviewed  Yes 6/4/2024  1:04 PM JACOB Leahy            ED Provider  Electronically Signed by             Ana Rosa Buckner DO  06/10/24 7333

## 2024-06-10 NOTE — CASE MANAGEMENT
Case Management Discharge Planning Note    Patient name Gulshan Arias  Location ED-29/ED-29 MRN [id number]  : 1960 Date 6/10/2024       Current Admission Date: 6/10/2024  Current Admission Diagnosis:Other chest pain   Patient Active Problem List    Diagnosis Date Noted Date Diagnosed    NSTEMI (non-ST elevated myocardial infarction) (HCC) 2024     Hyponatremia 2024     Financial difficulties 2024     Difficulty urinating 2021     Other chest pain 2019     Generalized anxiety disorder 2018     Gastroesophageal reflux disease without esophagitis 10/11/2018     Primary insomnia 10/08/2018     Chronic left shoulder pain 10/08/2018     Chronic midline low back pain without sciatica 10/08/2018     Snoring 10/08/2018     Chronic nausea 2016     Atrial fibrillation ([facility]) 2016     Essential hypertension      Hyperlipidemia      Type 2 diabetes mellitus without complication, with long-term current use of insulin ([facility])      Tobacco dependence        LOS (days): 0  Geometric Mean LOS (GMLOS) (days):   Days to GMLOS:     OBJECTIVE:  Risk of Unplanned Readmission Score: 13.91         Current admission status: Inpatient   Preferred Pharmacy:   [place], [place] - [phone] [facility]  [phone] [facility]   Phone: [phone] Fax: [phone]    Primary Care Provider: Osei Cleaning MD    Primary Insurance: MEDICARE  Secondary Insurance:     DISCHARGE DETAILS:    CM received message from [facility] to price check [facility] for patient.  CM called patient's pharmacy [facility] has $141.26 copay.  CM notified [facility].

## 2024-06-11 LAB
ANION GAP SERPL CALCULATED.3IONS-SCNC: 8 MMOL/L (ref 4–13)
BASOPHILS # BLD AUTO: 0.07 THOUSANDS/ÂΜL (ref 0–0.1)
BASOPHILS NFR BLD AUTO: 1 % (ref 0–1)
BUN SERPL-MCNC: 21 MG/DL (ref 5–25)
CALCIUM SERPL-MCNC: 9.2 MG/DL (ref 8.4–10.2)
CHLORIDE SERPL-SCNC: 100 MMOL/L (ref 96–108)
CO2 SERPL-SCNC: 27 MMOL/L (ref 21–32)
CREAT SERPL-MCNC: 1.12 MG/DL (ref 0.6–1.3)
EOSINOPHIL # BLD AUTO: 0.15 THOUSAND/ÂΜL (ref 0–0.61)
EOSINOPHIL NFR BLD AUTO: 1 % (ref 0–6)
ERYTHROCYTE [DISTWIDTH] IN BLOOD BY AUTOMATED COUNT: 12.2 % (ref 11.6–15.1)
GFR SERPL CREATININE-BSD FRML MDRD: 70 ML/MIN/1.73SQ M
GLUCOSE SERPL-MCNC: 125 MG/DL (ref 65–140)
GLUCOSE SERPL-MCNC: 127 MG/DL (ref 65–140)
GLUCOSE SERPL-MCNC: 145 MG/DL (ref 65–140)
GLUCOSE SERPL-MCNC: 162 MG/DL (ref 65–140)
GLUCOSE SERPL-MCNC: 203 MG/DL (ref 65–140)
HCT VFR BLD AUTO: 37.8 % (ref 36.5–49.3)
HGB BLD-MCNC: 12.7 G/DL (ref 12–17)
IMM GRANULOCYTES # BLD AUTO: 0.05 THOUSAND/UL (ref 0–0.2)
IMM GRANULOCYTES NFR BLD AUTO: 1 % (ref 0–2)
LYMPHOCYTES # BLD AUTO: 2.41 THOUSANDS/ÂΜL (ref 0.6–4.47)
LYMPHOCYTES NFR BLD AUTO: 23 % (ref 14–44)
MCH RBC QN AUTO: 31.3 PG (ref 26.8–34.3)
MCHC RBC AUTO-ENTMCNC: 33.6 G/DL (ref 31.4–37.4)
MCV RBC AUTO: 93 FL (ref 82–98)
MONOCYTES # BLD AUTO: 0.76 THOUSAND/ÂΜL (ref 0.17–1.22)
MONOCYTES NFR BLD AUTO: 7 % (ref 4–12)
NEUTROPHILS # BLD AUTO: 7.17 THOUSANDS/ÂΜL (ref 1.85–7.62)
NEUTS SEG NFR BLD AUTO: 67 % (ref 43–75)
NRBC BLD AUTO-RTO: 0 /100 WBCS
PLATELET # BLD AUTO: 282 THOUSANDS/UL (ref 149–390)
PMV BLD AUTO: 11.1 FL (ref 8.9–12.7)
POTASSIUM SERPL-SCNC: 3.4 MMOL/L (ref 3.5–5.3)
RBC # BLD AUTO: 4.06 MILLION/UL (ref 3.88–5.62)
SODIUM SERPL-SCNC: 135 MMOL/L (ref 135–147)
WBC # BLD AUTO: 10.61 THOUSAND/UL (ref 4.31–10.16)

## 2024-06-11 PROCEDURE — 99232 SBSQ HOSP IP/OBS MODERATE 35: CPT | Performed by: INTERNAL MEDICINE

## 2024-06-11 PROCEDURE — 99232 SBSQ HOSP IP/OBS MODERATE 35: CPT | Performed by: STUDENT IN AN ORGANIZED HEALTH CARE EDUCATION/TRAINING PROGRAM

## 2024-06-11 PROCEDURE — 82948 REAGENT STRIP/BLOOD GLUCOSE: CPT

## 2024-06-11 PROCEDURE — 85025 COMPLETE CBC W/AUTO DIFF WBC: CPT | Performed by: INTERNAL MEDICINE

## 2024-06-11 PROCEDURE — 80048 BASIC METABOLIC PNL TOTAL CA: CPT | Performed by: INTERNAL MEDICINE

## 2024-06-11 RX ORDER — OXYCODONE HYDROCHLORIDE 5 MG/1
5 TABLET ORAL EVERY 4 HOURS PRN
Status: DISCONTINUED | OUTPATIENT
Start: 2024-06-11 | End: 2024-06-12

## 2024-06-11 RX ORDER — POTASSIUM CHLORIDE 20 MEQ/1
40 TABLET, EXTENDED RELEASE ORAL ONCE
Status: COMPLETED | OUTPATIENT
Start: 2024-06-11 | End: 2024-06-11

## 2024-06-11 RX ADMIN — PANTOPRAZOLE SODIUM 40 MG: 40 TABLET, DELAYED RELEASE ORAL at 06:21

## 2024-06-11 RX ADMIN — ACETAMINOPHEN 975 MG: 325 TABLET, FILM COATED ORAL at 03:15

## 2024-06-11 RX ADMIN — ASPIRIN 81 MG: 81 TABLET, COATED ORAL at 09:43

## 2024-06-11 RX ADMIN — INSULIN LISPRO 5 UNITS: 100 INJECTION, SOLUTION INTRAVENOUS; SUBCUTANEOUS at 11:51

## 2024-06-11 RX ADMIN — BENZONATATE 100 MG: 100 CAPSULE ORAL at 21:09

## 2024-06-11 RX ADMIN — POTASSIUM CHLORIDE 40 MEQ: 1500 TABLET, EXTENDED RELEASE ORAL at 09:46

## 2024-06-11 RX ADMIN — COLCHICINE 0.6 MG: 0.6 TABLET ORAL at 09:43

## 2024-06-11 RX ADMIN — INSULIN GLARGINE 28 UNITS: 100 INJECTION, SOLUTION SUBCUTANEOUS at 21:10

## 2024-06-11 RX ADMIN — BENZONATATE 100 MG: 100 CAPSULE ORAL at 17:52

## 2024-06-11 RX ADMIN — ONDANSETRON 4 MG: 2 INJECTION INTRAMUSCULAR; INTRAVENOUS at 01:46

## 2024-06-11 RX ADMIN — AMIODARONE HYDROCHLORIDE 200 MG: 200 TABLET ORAL at 09:43

## 2024-06-11 RX ADMIN — METOPROLOL SUCCINATE 50 MG: 50 TABLET, EXTENDED RELEASE ORAL at 09:43

## 2024-06-11 RX ADMIN — ZOLPIDEM TARTRATE 10 MG: 5 TABLET, COATED ORAL at 21:09

## 2024-06-11 RX ADMIN — BENZONATATE 100 MG: 100 CAPSULE ORAL at 09:43

## 2024-06-11 RX ADMIN — TICAGRELOR 90 MG: 90 TABLET ORAL at 21:09

## 2024-06-11 RX ADMIN — AMLODIPINE BESYLATE 10 MG: 10 TABLET ORAL at 09:43

## 2024-06-11 RX ADMIN — HYDROCHLOROTHIAZIDE 25 MG: 25 TABLET ORAL at 09:43

## 2024-06-11 RX ADMIN — INSULIN LISPRO 5 UNITS: 100 INJECTION, SOLUTION INTRAVENOUS; SUBCUTANEOUS at 17:52

## 2024-06-11 RX ADMIN — LOSARTAN POTASSIUM 100 MG: 50 TABLET, FILM COATED ORAL at 09:43

## 2024-06-11 RX ADMIN — PAROXETINE HYDROCHLORIDE 20 MG: 20 TABLET, FILM COATED ORAL at 09:43

## 2024-06-11 RX ADMIN — METOCLOPRAMIDE 10 MG: 5 INJECTION, SOLUTION INTRAMUSCULAR; INTRAVENOUS at 21:11

## 2024-06-11 RX ADMIN — TICAGRELOR 90 MG: 90 TABLET ORAL at 09:43

## 2024-06-11 RX ADMIN — ONDANSETRON 4 MG: 2 INJECTION INTRAMUSCULAR; INTRAVENOUS at 17:57

## 2024-06-11 RX ADMIN — Medication 1 PATCH: at 09:44

## 2024-06-11 RX ADMIN — ATORVASTATIN CALCIUM 40 MG: 40 TABLET, FILM COATED ORAL at 17:52

## 2024-06-11 RX ADMIN — LIDOCAINE 5% 1 PATCH: 700 PATCH TOPICAL at 09:44

## 2024-06-11 RX ADMIN — DICLOFENAC SODIUM 2 G: 10 GEL TOPICAL at 03:35

## 2024-06-11 RX ADMIN — ONDANSETRON 4 MG: 2 INJECTION INTRAMUSCULAR; INTRAVENOUS at 09:56

## 2024-06-11 RX ADMIN — ISOSORBIDE MONONITRATE 30 MG: 30 TABLET, EXTENDED RELEASE ORAL at 09:43

## 2024-06-11 RX ADMIN — METOCLOPRAMIDE 10 MG: 5 INJECTION, SOLUTION INTRAMUSCULAR; INTRAVENOUS at 03:30

## 2024-06-11 RX ADMIN — INSULIN LISPRO 1 UNITS: 100 INJECTION, SOLUTION INTRAVENOUS; SUBCUTANEOUS at 11:51

## 2024-06-11 RX ADMIN — FLUTICASONE FUROATE 1 PUFF: 100 POWDER RESPIRATORY (INHALATION) at 09:44

## 2024-06-11 NOTE — CASE MANAGEMENT
Case Management Assessment & Discharge Planning Note    Patient name Gulshan Arias  Location East 4 /E4 -* MRN [id number]  : 1960 Date 2024       Current Admission Date: 6/10/2024  Current Admission Diagnosis:Other chest pain   Patient Active Problem List    Diagnosis Date Noted Date Diagnosed    NSTEMI (non-ST elevated myocardial infarction) (HCC) 2024     Hyponatremia 2024     Financial difficulties 2024     Difficulty urinating 2021     Other chest pain 2019     Generalized anxiety disorder 2018     Gastroesophageal reflux disease without esophagitis 10/11/2018     Primary insomnia 10/08/2018     Chronic left shoulder pain 10/08/2018     Chronic midline low back pain without sciatica 10/08/2018     Snoring 10/08/2018     Chronic nausea 2016     Atrial fibrillation (HCC) 2016     Essential hypertension      Hyperlipidemia      Type 2 diabetes mellitus without complication, with long-term current use of insulin ([facility])      Tobacco dependence        LOS (days): 1  Geometric Mean LOS (GMLOS) (days): 2.4  Days to GMLOS:1.3     OBJECTIVE:  PATIENT READMITTED TO HOSPITAL  Risk of Unplanned Readmission Score: 15.79         Current admission status: Inpatient       Preferred Pharmacy:   [place], [place] - [phone] [facility]  [phone] [facility]   Phone: [phone] Fax: [phone]    Primary Care Provider: Osei Cleaning MD    Primary Insurance: MEDICARE  Secondary Insurance:     ASSESSMENT:  Active Health Care Proxies       Jackie Mejia Care Representative - Spouse   Primary Phone: [phone] (Mobile)                 Advance Directives  Does patient have a Health Care POA?: No  Was patient offered paperwork?: Yes (declined)  Does patient currently have a Health Care decision maker?: Yes, please see Health Care Proxy section  Does patient have Advance Directives?:  No  Was patient offered paperwork?: Yes (declined)  Primary Contact: billy Mejia         Readmission Root Cause  30 Day Readmission: Yes  Who directed you to return to the hospital?: Family  Did you understand whom to contact if you had questions or problems?: Yes  Did you get your prescriptions before you left the hospital?: Yes  Were you able to get your prescriptions filled when you left the hospital?: Yes  Did you take your medications as prescribed?: Yes  Were you able to get to your follow-up appointments?: No  Reason:: Readmitted prior to appointment  During previous admission, was a post-acute recommendation made?: No  Patient was readmitted due to: Chest Pain  Action Plan: Cards following, take medications as prescribed    Patient Information  Admitted from:: Home  Mental Status: Alert  During Assessment patient was accompanied by: Not accompanied during assessment  Assessment information provided by:: Patient  Primary Caregiver: Self  Support Systems: Spouse/significant other, Family members  County of Residence: [place]  What [facility] do you live in?: [place]  Home entry access options. Select all that apply.: Stairs  Number of steps to enter home.: 4  Do the steps have railings?: No  Type of Current Residence: 2 story home  Upon entering residence, is there a bedroom on the main floor (no further steps)?: No  A bedroom is located on the following floor levels of residence (select all that apply):: 2nd Floor  Upon entering residence, is there a bathroom on the main floor (no further steps)?: No  Indicate which floors of current residence have a bathroom (select all the apply):: 2nd Floor  Number of steps to 2nd floor from main floor: One Flight  Living Arrangements: Lives w/ Spouse/significant other  Is patient a ?: No    Activities of Daily Living Prior to Admission  Functional Status: Independent  Completes ADLs independently?: Yes  Ambulates independently?: Yes  Does patient use assisted devices?:  Yes  Assisted Devices (DME) used: Straight Cane  Does patient currently own DME?: Yes  What DME does the patient currently own?: Straight [employer]  Does patient have a history of Outpatient Therapy (PT/OT)?: No  Does the patient have a history of Short-Term Rehab?: No  Does patient have a history of HHC?: No  Does patient currently have HHC?: No         Patient Information Continued  Income Source: Pension/alf  Does patient have prescription coverage?: Yes  Does patient receive dialysis treatments?: No  Does patient have a history of substance abuse?: No  Does patient have a history of Mental Health Diagnosis?: Yes (Anxiety)  Is patient receiving treatment for mental health?: No. Patient declined treatment information.  Has patient received inpatient treatment related to mental health in the last 2 years?: No         Means of Transportation  Means of Transport to Appts:: Drives Self      Social Determinants of Health (SDOH)      Flowsheet Row Most Recent Value   Housing Stability    In the last 12 months, was there a time when you were not able to pay the mortgage or rent on time? N   In the past 12 months, how many times have you moved where you were living? 0   At any time in the past 12 months, were you homeless or living in a shelter (including now)? N   Transportation Needs    In the past 12 months, has lack of transportation kept you from medical appointments or from getting medications? no   In the past 12 months, has lack of transportation kept you from meetings, work, or from getting things needed for daily living? No   Food Insecurity    Within the past 12 months, you worried that your food would run out before you got the money to buy more. Never true   Within the past 12 months, the food you bought just didn't last and you didn't have money to get more. Never true   Utilities    In the past 12 months has the electric, gas, oil, or water company threatened to shut off services in your home? No             DISCHARGE DETAILS:    Discharge planning discussed with:: Patient  Freedom of Choice: Yes     CM contacted family/caregiver?: No- see comments (Pt declined)  Were Treatment Team discharge recommendations reviewed with patient/caregiver?: Yes  Did patient/caregiver verbalize understanding of patient care needs?: Yes  Were patient/caregiver advised of the risks associated with not following Treatment Team discharge recommendations?: Yes         Requested Home Health Care         Is the patient interested in HHC at discharge?: No    DME Referral Provided  Referral made for DME?: No         Would you like to participate in our [facility] service program?  : No - Declined         CM met with patient at bedside to introduce self and role with discharge planning. CM utilized   #[id number] during assessment. Patient reports living with his wife in a two story home with 4 steps to enter.   Patient reports being fully independent PTA, he drives. Patient utilizes a SPC at home. Patient reports his wife encouraged him to come to the hospital due to chest pain.   Patient denies any issues with transportation to appointments or getting his medications.  Patient does not identify any CM needs at this time, CM department remains available.

## 2024-06-11 NOTE — PROGRESS NOTES
[facility]  Progress Note  Name: Gulshan Arias I  MRN: [id number]  Unit/Bed#: E4 -01 I Date of Admission: 6/10/2024   Date of Service: 6/11/2024 I Hospital Day: 1    Assessment & Plan   * Other chest pain  Assessment & Plan  Recurrent chest pain after recent NSTEMI.  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 Benavides  Continue ASA, Brilinta, Xarelto statin and beta-blocker      NSTEMI (non-ST elevated myocardial infarction) (HCC)  Assessment & Plan  Recent admission for NSTEMI status post cardiac cath with drug-eluting stents placed to 99% OM 2 lesion.  His current troponin is trending down compared to a few days ago which is reassuring.  Continue triple therapy with aspirin Brilinta and Xarelto for 14 days  Switch simvastatin to Lipitor while in the hospital  Per DC summary, he was supposed to be on triple therapy for 14 days then dual therapy after  Emphasized tobacco cessation    Generalized anxiety disorder  Assessment & Plan  Continue Paxil daily.  PDMP reviewed and confirmed use of Xanax    Atrial fibrillation ([facility])  Assessment & Plan  Continue amiodarone 200 mg daily  Continue Xarelto for stroke prevention    Tobacco dependence  Assessment & Plan  Emphasized tobacco cessation.  Continue nicotine patch.    Type 2 diabetes mellitus without complication, with long-term current use of insulin ([facility])  Assessment & Plan  Lab Results   Component Value Date    HGBA1C 9.5 (A) 01/04/2024     Results from last 7 days   Lab Units 06/11/24  1609 06/11/24  1114  24  0723 06/10/24  2044 06/10/24  1643   POC GLUCOSE mg/dl 125 203* 127 142* 165*       Continue Lantus 28 units at night  Humalog 5 units with meals and sliding scale      Hyperlipidemia  Assessment & Plan  Switch simvastatin to Lipitor while in the hospital    Essential hypertension  Assessment & Plan  Continue losartan, HCTZ and Norvasc with hold parameters             Mobility:  Basic Mobility Inpatient Raw Score: 24  JH-HLM Goal: 8: Walk 250 feet or more  JH-HLM Achieved: 8: Walk 250 feet ot more  JH-HLM Goal achieved. Continue to encourage appropriate mobility.    VTE Pharmacologic Prophylaxis:   Pharmacologic: xarelto    Patient Centered Rounds: I have performed bedside rounds with nursing staff today.    Discussions with Specialists or Other Care Team Provider: Cardiologist, Dr. Benavides    Education and Discussions with Family / Patient: Updated patient's family at bedside    Time Spent for Care:   More than 50% of total time spent on counseling and coordination of care as described above.    Current Length of Stay: 1 day(s)    Current Patient Status: Inpatient   Certification Statement: The patient will continue to require additional inpatient hospital stay due to chest pain    Discharge Plan / Estimated Discharge Date: 24h    Code Status: Level 1 - Full Code      Subjective:   Patient seen and examined at bedside, states chest pain is better now rating 5 out of 10 compared to yesterday it was 8 out of 10.  Complains of nausea which she does have chronically when he has persistent coughing.  Denies any dyspnea or abdominal pain    Objective:     Vitals:   Temp (24hrs), Av.5 °F (36.4 °C), Min:96.7 °F (35.9 °C), Max:98.5 °F (36.9 °C)    Temp:  [96.7 °F (35.9 °C)-98.5 °F (36.9 °C)] 97.8 °F (36.6 °C)  HR:  [60-86] 62  Resp:  [17-20] 17  BP: (117-146)/(64-81) 146/81  SpO2:  [93 %-100 %] 98 %  Body mass index is 32.61 kg/m².     Input and Output Summary (last 24 hours):       Intake/Output Summary (Last  24 hours) at 6/11/2024 1711  Last data filed at 6/11/2024 0900  Gross per 24 hour   Intake 360 ml   Output --   Net 360 ml       Physical Exam:    Constitutional: Patient is oriented to person, place and time, no acute distress  HEENT:  Normocephalic, atraumatic  Cardiovascular: Normal S1S2, RRR, No murmurs/rubs/gallops appreciated.  Pulmonary:  Bilateral air entry, No rhonchi/rales/wheezing appreciated  Abdominal: Soft, Bowel sounds present, Non-tender, Non-distended  Extremities:  No cyanosis, clubbing or edema.   Neurological:awake, alert  Skin:  Warm, dry    Additional Data:     Labs:    Results from last 7 days   Lab Units 06/11/24  0513   WBC Thousand/uL 10.61*   HEMOGLOBIN g/dL 12.7   HEMATOCRIT % 37.8   PLATELETS Thousands/uL 282   SEGS PCT % 67   LYMPHO PCT % 23   MONO PCT % 7   EOS PCT % 1     Results from last 7 days   Lab Units 06/11/24  0513 06/10/24  0857   POTASSIUM mmol/L 3.4* 3.7   CHLORIDE mmol/L 100 101   CO2 mmol/L 27 29   BUN mg/dL 21 23   CREATININE mg/dL 1.12 1.31*   CALCIUM mg/dL 9.2 9.8   ALK PHOS U/L  --  74   ALT U/L  --  15   AST U/L  --  13            I Have Reviewed All Lab Data Listed Above.    Invasive Devices       Peripheral Intravenous Line  Duration             Peripheral IV 06/10/24 Dorsal (posterior);Right Hand 1 day    Peripheral IV 06/10/24 Right;Ventral (anterior) Forearm 1 day                         Recent Cultures (last 7 days):           Last 24 Hours Medication List:   Current Facility-Administered Medications   Medication Dose Route Frequency Provider Last Rate    acetaminophen  975 mg Oral Q8H PRN Abdulaziz Poole PA-C      albuterol  2 puff Inhalation Q6H PRN Bridger Keane MD      ALPRAZolam  0.25 mg Oral BID PRN Bridger Keane MD      amiodarone  200 mg Oral Daily Bridger Keane MD      amLODIPine  10 mg Oral Daily Bridger Keane MD      aspirin  81 mg Oral Daily Bridger Keane MD      atorvastatin   40 mg Oral Daily With Dinner Bridger Keane MD      benzonatate  100 mg Oral TID Bridger Keane MD      Diclofenac Sodium  2 g Topical Q6H PRN MIR Casillas-C      fluticasone  1 puff Inhalation Daily Bridger Keane MD      guaiFENesin  200 mg Oral Q4H PRN Abdulaziz Poole PA-C      losartan  100 mg Oral Daily Bridger Keane MD      And    hydroCHLOROthiazide  25 mg Oral Daily Bridger Keane MD      insulin glargine  28 Units Subcutaneous HS Bridger Keane MD      insulin lispro  1-5 Units Subcutaneous TID AC Bridger Keane MD      insulin lispro  5 Units Subcutaneous TID With Meals Bridger Keane MD      isosorbide mononitrate  30 mg Oral Daily Mariely Dasilva PA-C      lidocaine  1 patch Topical Daily Wayne Benavides MD      metoclopramide  10 mg Intravenous Q6H PRN Abdulaziz Poole PA-C      metoprolol succinate  50 mg Oral Daily Bridger Keane MD      nicotine  1 patch Transdermal Daily Bridger Keane MD      nicotine  1 patch Transdermal Daily Bridger Keane MD      ondansetron  4 mg Intravenous Q6H PRN Bridger Keane MD      oxyCODONE  5 mg Oral Q4H PRN Giovany Jain MD      pantoprazole  40 mg Oral Daily Before Breakfast Bridger Keane MD      PARoxetine  20 mg Oral Daily Bridger Keane MD      ticagrelor  90 mg Oral Q12H CIARA Bridger Keane MD      zolpidem  10 mg Oral HS PRN Bridger Keane MD          Today, Patient Was Seen By: Giovany Jain MD

## 2024-06-11 NOTE — PROGRESS NOTES
Cardiology Progress Note - Gulshan Arias 63 y.o. male MRN: [id number]    Unit/Bed#: E4 -01 Encounter: [id number]      Assessment & Plan:    Precordial chest pain  -Troponin trend 1044->833->795  -No acute ischemic changes on ECG  -Appears to be more likely musculoskeletal chest pain, but differential also includes Dressler syndrome and microvascular ischemia  - Added Imdur 30 mg daily for microvascular ischemia and colchicine for possible Dressler syndrome-> will discontinue colchicine given his underlying renal dysfunction    NSTEMI (non-ST elevated myocardial infarction) (HCC)  - [facility] 6/3/2024 showed 99% occluded mid OM 2, status post ASHVIN x 2  - Continue with Brilinta 90 mg twice daily and aspirin 81 mg daily  - Plan for triple therapy with aspirin, Brilinta and Xarelto for 2 weeks and then stop aspirin    Paroxysmal atrial fibrillation (HCC)  -Anticoagulated on Xarelto 15 mg daily  - Rate/rhythm control with Toprol-XL 50 mg daily amiodarone 200 mg daily    Essential hypertension  -Continue with amlodipine 10 mg daily, losartan/HCTZ 100/25 mg daily, and Toprol-XL 50 mg daily    Hyperlipidemia  -Outpatient Rx simvastatin 40 mg daily, will change to atorvastatin    Type 2 diabetes mellitus without complication, with long-term current use of insulin ([facility])    Tobacco dependence  - Encourage smoking cessation    Generalized anxiety disorder     Summary:  -Patient's chest discomfort improved today, appears more likely musculoskeletal  - Will continue with Imdur for possible microvascular disease  - Given the patient's renal dysfunction, will discontinue colchicine for now  - If he has recurrent chest discomfort, can consider restarting colchicine for 4 weeks  - No need for repeat ischemic testing at this time  - Continue with aspirin, Brilinta and Xarelto until 6/17 and then stop only aspirin    Subjective:   No significant events overnight.  He still reports some mild chest discomfort, but overall  significantly improved compared to yesterday.  Denies shortness of breath, orthopnea, abdominal pain, nausea, vomiting, fever, chills, headache, dizziness or palpitations.    Objective:     Vitals: Blood pressure 124/80, pulse 67, temperature 97.7 °F (36.5 °C), temperature source Temporal, resp. rate 18, weight 86.2 kg (190 lb), SpO2 96%., Body mass index is 32.61 kg/m².,   Orthostatic Blood Pressures      Flowsheet Row Most Recent Value   Blood Pressure 124/80 filed at 06/11/2024 1110   Patient Position - Orthostatic VS Sitting filed at 06/11/2024 1110              Intake/Output Summary (Last 24 hours) at 6/11/2024 1139  Last data filed at 6/10/2024 2309  Gross per 24 hour   Intake 240 ml   Output --   Net 240 ml           Physical Exam:    GEN: Gulshan Arias appears well, alert and oriented x 3, pleasant and cooperative   HEENT: Mucous membranes moist, no scleral icterus, no conjunctival pallor  NECK: No elevated JVD  HEART: Regular rate and rhythm, normal S1 and S2, no murmurs or rubs   LUNGS: clear to auscultation bilaterally; no wheezes, rales, or rhonchi   ABDOMEN: normal bowel sounds, soft, no tenderness, no distention  EXTREMITIES: peripheral pulses normal; no lower extremity edema   NEURO: no focal findings   SKIN: No lesions or rashes on exposed skin        Current Facility-Administered Medications:     acetaminophen (TYLENOL) tablet 975 mg, 975 mg, Oral, Q8H PRN, Abdulaziz Poole PA-C, 975 mg at 06/11/24 0315    albuterol (PROVENTIL HFA,VENTOLIN HFA) inhaler 2 puff, 2 puff, Inhalation, Q6H PRN, Bridger Keane MD    ALPRAZolam (XANAX) tablet 0.25 mg, 0.25 mg, Oral, BID PRN, Bridger Keane MD, 0.25 mg at 06/10/24 2123    amiodarone tablet 200 mg, 200 mg, Oral, Daily, Bridger Keane MD, 200 mg at 06/11/24 0943    amLODIPine (NORVASC) tablet 10 mg, 10 mg, Oral, Daily, Bridger Keane MD, 10 mg at 06/11/24 0943    aspirin (ECOTRIN LOW STRENGTH) EC  tablet 81 mg, 81 mg, Oral, Daily, Bridger Keane MD, 81 mg at 06/11/24 0943    atorvastatin (LIPITOR) tablet 40 mg, 40 mg, Oral, Daily With Dinner, Bridger Keane MD, 40 mg at 06/10/24 1650    benzonatate (TESSALON PERLES) capsule 100 mg, 100 mg, Oral, TID, Bridger Keane MD, 100 mg at 06/11/24 0943    colchicine (COLCRYS) tablet 0.6 mg, 0.6 mg, Oral, BID, Mariely Dasilva PA-C, 0.6 mg at 06/11/24 0943    Diclofenac Sodium (VOLTAREN) 1 % topical gel 2 g, 2 g, Topical, Q6H PRN, Abdulaziz Poole PA-C, 2 g at 06/11/24 0335    fluticasone (ARNUITY ELLIPTA) 100 MCG/ACT inhaler 1 puff, 1 puff, Inhalation, Daily, Bridger Keane MD, 1 puff at 06/11/24 0944    guaiFENesin (ROBITUSSIN) oral liquid 200 mg, 200 mg, Oral, Q4H PRN, Abdulaziz Poole PA-C, 200 mg at 06/10/24 2123    losartan (COZAAR) tablet 100 mg, 100 mg, Oral, Daily, 100 mg at 06/11/24 0943 **AND** hydroCHLOROthiazide tablet 25 mg, 25 mg, Oral, Daily, Bridger Keane MD, 25 mg at 06/11/24 0943    insulin glargine (LANTUS) subcutaneous injection 28 Units 0.28 mL, 28 Units, Subcutaneous, HS, Bridger Keane MD, 28 Units at 06/10/24 2114    insulin lispro (HumALOG/ADMELOG) 100 units/mL subcutaneous injection 1-5 Units, 1-5 Units, Subcutaneous, TID AC **AND** Fingerstick Glucose (POCT), , , TID AC, Bridger Keane MD    insulin lispro (HumALOG/ADMELOG) 100 units/mL subcutaneous injection 5 Units, 5 Units, Subcutaneous, TID With Meals, Bridger Keane MD, 5 Units at 06/10/24 1527    isosorbide mononitrate (IMDUR) 24 hr tablet 30 mg, 30 mg, Oral, Daily, Mariely Dasilva PA-C, 30 mg at 06/11/24 0943    lidocaine (LIDODERM) 5 % patch 1 patch, 1 patch, Topical, Daily, Wayne Benavides MD, 1 patch at 06/11/24 0944    metoclopramide (REGLAN) injection 10 mg, 10 mg, Intravenous, Q6H PRN, Abdulaziz Poole PA-C, 10 mg at 06/11/24 0330    metoprolol succinate (TOPROL-XL) 24 hr tablet 50  "mg, 50 mg, Oral, Daily, Bridger Keane MD, 50 mg at 06/11/24 0943    nicotine (NICODERM CQ) 14 mg/24hr TD 24 hr patch 1 patch, 1 patch, Transdermal, Daily, Bridger Keane MD, 1 patch at 06/10/24 1458    nicotine (NICODERM CQ) 14 mg/24hr TD 24 hr patch 1 patch, 1 patch, Transdermal, Daily, Bridger Keane MD, 1 patch at 06/11/24 0944    ondansetron (ZOFRAN) injection 4 mg, 4 mg, Intravenous, Q6H PRN, Bridger Keane MD, 4 mg at 06/11/24 0956    oxyCODONE (ROXICODONE) IR tablet 5 mg, 5 mg, Oral, Q4H PRN, Giovany Jain MD    pantoprazole (PROTONIX) EC tablet 40 mg, 40 mg, Oral, Daily Before Breakfast, Bridger Keane MD, 40 mg at 06/11/24 0621    PARoxetine (PAXIL) tablet 20 mg, 20 mg, Oral, Daily, Bridger Keane MD, 20 mg at 06/11/24 0943    ticagrelor (BRILINTA) tablet 90 mg, 90 mg, Oral, Q12H CIARA, Bridger Kaene MD, 90 mg at 06/11/24 0943    zolpidem (AMBIEN) tablet 10 mg, 10 mg, Oral, HS PRN, Bridger Keane MD    Labs & Results:    Lab Results   Component Value Date    TROPONINI <0.02 04/25/2019    TROPONINI <0.02 04/25/2019    TROPONINI <0.02 04/25/2019       Lab Results   Component Value Date    CALCIUM 9.2 06/11/2024    K 3.4 (L) 06/11/2024    CO2 27 06/11/2024     06/11/2024    BUN 21 06/11/2024    CREATININE 1.12 06/11/2024       Lab Results   Component Value Date    WBC 10.61 (H) 06/11/2024    HGB 12.7 06/11/2024    HCT 37.8 06/11/2024    MCV 93 06/11/2024     06/11/2024           No results found for: \"CHOL\"  Lab Results   Component Value Date    HDL 30 (L) 01/30/2024    HDL 24 (L) 09/29/2016     Lab Results   Component Value Date    LDLCALC  01/30/2024      Comment:      Calculated LDL invalid, triglycerides >400 mg/dl  This screening LDL is a calculated result.   It does not have the accuracy of the Direct Measured LDL in the monitoring of patients with hyperlipidemia and/or statin " therapy.   Direct Measure LDL (LTG580) must be ordered separately in these patients.    LDLCALC 94 09/29/2016     Lab Results   Component Value Date    TRIG 417 (H) 01/30/2024    TRIG 328 (H) 09/29/2016       Lab Results   Component Value Date    ALT 15 06/10/2024    AST 13 06/10/2024    ALKPHOS 74 06/10/2024         EKG personally reviewed by )Wayne Benavides MD. No acute changes   TELE: No significant arrhythmias seen on telemetry review.

## 2024-06-11 NOTE — PLAN OF CARE
Problem: Potential for Falls  Goal: Patient will remain free of falls  Description: INTERVENTIONS:  - Educate patient/family on patient safety including physical limitations  - Instruct patient to call for assistance with activity   - Consult OT/PT to assist with strengthening/mobility   - Keep Call bell within reach  - Keep bed low and locked with side rails adjusted as appropriate  - Keep care items and personal belongings within reach  - Initiate and maintain comfort rounds  - Make Fall Risk Sign visible to staff  - Offer Toileting every 2 Hours, in advance of need  - Initiate/Maintain alarm  - Obtain necessary fall risk management equipment:   - Apply yellow socks and bracelet for high fall risk patients  - Consider moving patient to room near nurses station  Outcome: Progressing     Problem: CARDIOVASCULAR - ADULT  Goal: Maintains optimal cardiac output and hemodynamic stability  Description: INTERVENTIONS:  - Monitor I/O, vital signs and rhythm  - Monitor for S/S and trends of decreased cardiac output  - Administer and titrate ordered vasoactive medications to optimize hemodynamic stability  - Assess quality of pulses, skin color and temperature  - Assess for signs of decreased coronary artery perfusion  - Instruct patient to report change in severity of symptoms  Outcome: Progressing     Problem: GASTROINTESTINAL - ADULT  Goal: Minimal or absence of nausea and/or vomiting  Description: INTERVENTIONS:  - Administer IV fluids if ordered to ensure adequate hydration  - Maintain NPO status until nausea and vomiting are resolved  - Nasogastric tube if ordered  - Administer ordered antiemetic medications as needed  - Provide nonpharmacologic comfort measures as appropriate  - Advance diet as tolerated, if ordered  - Consider nutrition services referral to assist patient with adequate nutrition and appropriate food choices  Outcome: Progressing     Problem: PAIN - ADULT  Goal: Verbalizes/displays adequate comfort  level or baseline comfort level  Description: Interventions:  - Encourage patient to monitor pain and request assistance  - Assess pain using appropriate pain scale  - Administer analgesics based on type and severity of pain and evaluate response  - Implement non-pharmacological measures as appropriate and evaluate response  - Consider cultural and social influences on pain and pain management  - Notify physician/advanced practitioner if interventions unsuccessful or patient reports new pain  Outcome: Progressing     Problem: Knowledge Deficit  Goal: Patient/family/caregiver demonstrates understanding of disease process, treatment plan, medications, and discharge instructions  Description: Complete learning assessment and assess knowledge base.  Interventions:  - Provide teaching at level of understanding  - Provide teaching via preferred learning methods  Outcome: Progressing

## 2024-06-11 NOTE — ASSESSMENT & PLAN NOTE
Lab Results   Component Value Date    HGBA1C 9.5 (A) 01/04/2024     Results from last 7 days   Lab Units 06/11/24  1609 06/11/24  1114 06/11/24  0723 06/10/24  2044 06/10/24  1643   POC GLUCOSE mg/dl 125 203* 127 142* 165*       Continue Lantus 28 units at night  Humalog 5 units with meals and sliding scale

## 2024-06-11 NOTE — ASSESSMENT & PLAN NOTE
Recurrent chest pain after recent NSTEMI.  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 Benavides  Continue ASA, Brilinta, Xarelto statin and beta-blocker

## 2024-06-12 VITALS
SYSTOLIC BLOOD PRESSURE: 139 MMHG | DIASTOLIC BLOOD PRESSURE: 82 MMHG | HEART RATE: 64 BPM | BODY MASS INDEX: 32.61 KG/M2 | WEIGHT: 190 LBS | RESPIRATION RATE: 18 BRPM | OXYGEN SATURATION: 96 % | TEMPERATURE: 97 F

## 2024-06-12 LAB
ANION GAP SERPL CALCULATED.3IONS-SCNC: 10 MMOL/L (ref 4–13)
BASOPHILS # BLD AUTO: 0.08 THOUSANDS/ÂΜL (ref 0–0.1)
BASOPHILS NFR BLD AUTO: 1 % (ref 0–1)
BUN SERPL-MCNC: 16 MG/DL (ref 5–25)
CALCIUM SERPL-MCNC: 9.2 MG/DL (ref 8.4–10.2)
CHLORIDE SERPL-SCNC: 99 MMOL/L (ref 96–108)
CO2 SERPL-SCNC: 27 MMOL/L (ref 21–32)
CREAT SERPL-MCNC: 1.03 MG/DL (ref 0.6–1.3)
EOSINOPHIL # BLD AUTO: 0.21 THOUSAND/ÂΜL (ref 0–0.61)
EOSINOPHIL NFR BLD AUTO: 3 % (ref 0–6)
ERYTHROCYTE [DISTWIDTH] IN BLOOD BY AUTOMATED COUNT: 12.1 % (ref 11.6–15.1)
GFR SERPL CREATININE-BSD FRML MDRD: 77 ML/MIN/1.73SQ M
GLUCOSE SERPL-MCNC: 109 MG/DL (ref 65–140)
GLUCOSE SERPL-MCNC: 194 MG/DL (ref 65–140)
GLUCOSE SERPL-MCNC: 210 MG/DL (ref 65–140)
HCT VFR BLD AUTO: 42.1 % (ref 36.5–49.3)
HGB BLD-MCNC: 14.1 G/DL (ref 12–17)
IMM GRANULOCYTES # BLD AUTO: 0.03 THOUSAND/UL (ref 0–0.2)
IMM GRANULOCYTES NFR BLD AUTO: 0 % (ref 0–2)
LYMPHOCYTES # BLD AUTO: 2.15 THOUSANDS/ÂΜL (ref 0.6–4.47)
LYMPHOCYTES NFR BLD AUTO: 27 % (ref 14–44)
MCH RBC QN AUTO: 30.9 PG (ref 26.8–34.3)
MCHC RBC AUTO-ENTMCNC: 33.5 G/DL (ref 31.4–37.4)
MCV RBC AUTO: 92 FL (ref 82–98)
MONOCYTES # BLD AUTO: 0.58 THOUSAND/ÂΜL (ref 0.17–1.22)
MONOCYTES NFR BLD AUTO: 7 % (ref 4–12)
NEUTROPHILS # BLD AUTO: 5.01 THOUSANDS/ÂΜL (ref 1.85–7.62)
NEUTS SEG NFR BLD AUTO: 62 % (ref 43–75)
NRBC BLD AUTO-RTO: 0 /100 WBCS
PLATELET # BLD AUTO: 339 THOUSANDS/UL (ref 149–390)
PMV BLD AUTO: 10.5 FL (ref 8.9–12.7)
POTASSIUM SERPL-SCNC: 3.4 MMOL/L (ref 3.5–5.3)
RBC # BLD AUTO: 4.56 MILLION/UL (ref 3.88–5.62)
SODIUM SERPL-SCNC: 136 MMOL/L (ref 135–147)
WBC # BLD AUTO: 8.06 THOUSAND/UL (ref 4.31–10.16)

## 2024-06-12 PROCEDURE — 85025 COMPLETE CBC W/AUTO DIFF WBC: CPT | Performed by: INTERNAL MEDICINE

## 2024-06-12 PROCEDURE — 80048 BASIC METABOLIC PNL TOTAL CA: CPT | Performed by: INTERNAL MEDICINE

## 2024-06-12 PROCEDURE — 82948 REAGENT STRIP/BLOOD GLUCOSE: CPT

## 2024-06-12 PROCEDURE — 99239 HOSP IP/OBS DSCHRG MGMT >30: CPT | Performed by: INTERNAL MEDICINE

## 2024-06-12 PROCEDURE — 99232 SBSQ HOSP IP/OBS MODERATE 35: CPT | Performed by: STUDENT IN AN ORGANIZED HEALTH CARE EDUCATION/TRAINING PROGRAM

## 2024-06-12 RX ORDER — OXYCODONE HYDROCHLORIDE 5 MG/1
5 TABLET ORAL EVERY 4 HOURS PRN
Status: DISCONTINUED | OUTPATIENT
Start: 2024-06-12 | End: 2024-06-12 | Stop reason: HOSPADM

## 2024-06-12 RX ORDER — ISOSORBIDE MONONITRATE 30 MG/1
30 TABLET, EXTENDED RELEASE ORAL DAILY
Qty: 30 TABLET | Refills: 0 | Status: SHIPPED | OUTPATIENT
Start: 2024-06-13 | End: 2024-06-17 | Stop reason: SDUPTHER

## 2024-06-12 RX ORDER — BENZONATATE 100 MG/1
100 CAPSULE ORAL 3 TIMES DAILY
Qty: 20 CAPSULE | Refills: 0 | Status: SHIPPED | OUTPATIENT
Start: 2024-06-12

## 2024-06-12 RX ORDER — POTASSIUM CHLORIDE 20 MEQ/1
40 TABLET, EXTENDED RELEASE ORAL ONCE
Status: COMPLETED | OUTPATIENT
Start: 2024-06-12 | End: 2024-06-12

## 2024-06-12 RX ORDER — ONDANSETRON 2 MG/ML
4 INJECTION INTRAMUSCULAR; INTRAVENOUS EVERY 6 HOURS PRN
Status: DISCONTINUED | OUTPATIENT
Start: 2024-06-12 | End: 2024-06-12 | Stop reason: HOSPADM

## 2024-06-12 RX ORDER — LIDOCAINE 50 MG/G
1 PATCH TOPICAL DAILY
Qty: 10 PATCH | Refills: 0 | Status: SHIPPED | OUTPATIENT
Start: 2024-06-13

## 2024-06-12 RX ADMIN — Medication 1 PATCH: at 08:07

## 2024-06-12 RX ADMIN — ONDANSETRON 4 MG: 2 INJECTION INTRAMUSCULAR; INTRAVENOUS at 01:49

## 2024-06-12 RX ADMIN — BENZONATATE 100 MG: 100 CAPSULE ORAL at 08:07

## 2024-06-12 RX ADMIN — ACETAMINOPHEN 975 MG: 325 TABLET, FILM COATED ORAL at 05:36

## 2024-06-12 RX ADMIN — FLUTICASONE FUROATE 1 PUFF: 100 POWDER RESPIRATORY (INHALATION) at 08:09

## 2024-06-12 RX ADMIN — HYDROCHLOROTHIAZIDE 25 MG: 25 TABLET ORAL at 08:07

## 2024-06-12 RX ADMIN — ISOSORBIDE MONONITRATE 30 MG: 30 TABLET, EXTENDED RELEASE ORAL at 08:07

## 2024-06-12 RX ADMIN — PANTOPRAZOLE SODIUM 40 MG: 40 TABLET, DELAYED RELEASE ORAL at 05:36

## 2024-06-12 RX ADMIN — TICAGRELOR 90 MG: 90 TABLET ORAL at 08:07

## 2024-06-12 RX ADMIN — INSULIN LISPRO 1 UNITS: 100 INJECTION, SOLUTION INTRAVENOUS; SUBCUTANEOUS at 12:19

## 2024-06-12 RX ADMIN — ASPIRIN 81 MG: 81 TABLET, COATED ORAL at 08:07

## 2024-06-12 RX ADMIN — AMIODARONE HYDROCHLORIDE 200 MG: 200 TABLET ORAL at 08:07

## 2024-06-12 RX ADMIN — AMLODIPINE BESYLATE 10 MG: 10 TABLET ORAL at 08:07

## 2024-06-12 RX ADMIN — INSULIN LISPRO 5 UNITS: 100 INJECTION, SOLUTION INTRAVENOUS; SUBCUTANEOUS at 12:19

## 2024-06-12 RX ADMIN — LOSARTAN POTASSIUM 100 MG: 50 TABLET, FILM COATED ORAL at 08:07

## 2024-06-12 RX ADMIN — METOPROLOL SUCCINATE 50 MG: 50 TABLET, EXTENDED RELEASE ORAL at 08:07

## 2024-06-12 RX ADMIN — LIDOCAINE 5% 1 PATCH: 700 PATCH TOPICAL at 08:07

## 2024-06-12 RX ADMIN — POTASSIUM CHLORIDE 40 MEQ: 1500 TABLET, EXTENDED RELEASE ORAL at 08:12

## 2024-06-12 RX ADMIN — PAROXETINE HYDROCHLORIDE 20 MG: 20 TABLET, FILM COATED ORAL at 08:07

## 2024-06-12 NOTE — ASSESSMENT & PLAN NOTE
Lab Results   Component Value Date    HGBA1C 9.5 (A) 01/04/2024     Results from last 7 days   Lab Units 06/12/24  0723 06/11/24  2111 06/11/24  1609 06/11/24  1114 06/11/24  0723   POC GLUCOSE mg/dl 109 162* 125 203* 127         Continue Lantus 28 units at night  Humalog 5 units with meals and sliding scale

## 2024-06-12 NOTE — PROGRESS NOTES
Cardiology Progress Note - Gulshan Arias 63 y.o. male MRN: [id number]    Unit/Bed#: E4 -01 Encounter: [id number]      Assessment & Plan:    Precordial chest pain  -Troponin trend 1044->[id number]  -No acute ischemic changes on ECG  -Appears to be more likely musculoskeletal chest pain, but differential also includes Dressler syndrome and microvascular ischemia  - Added Imdur 30 mg daily for possible microvascular ischemia and colchicine for possible Dressler syndrome-> will discontinue colchicine given his underlying renal dysfunction    NSTEMI (non-ST elevated myocardial infarction) (HCC)  - [facility] 6/3/2024 showed 99% occluded mid OM 2, status post ASHVIN x 2  - Continue with Brilinta 90 mg twice daily and aspirin 81 mg daily  - Plan for triple therapy with aspirin, Brilinta and Xarelto for 2 weeks and then stop aspirin  -Of note patient stopped aspirin after discharge on 6/4, reminded him to continue aspirin with the Brilinta and Xarelto until 6/17    Paroxysmal atrial fibrillation ([facility])  -Anticoagulated on Xarelto 15 mg daily  - Rate/rhythm control with Toprol-XL 50 mg daily amiodarone 200 mg daily    Essential hypertension  -Continue with amlodipine 10 mg daily, losartan/HCTZ 100/25 mg daily, and Toprol-XL 50 mg daily    Hyperlipidemia  -Outpatient Rx simvastatin 40 mg daily, will change to atorvastatin    Type 2 diabetes mellitus without complication, with long-term current use of insulin ([facility])    Tobacco dependence  - Encourage smoking cessation    Generalized anxiety disorder     Summary:  -Still some mild chest discomfort, but overall improved, peers to be most likely musculoskeletal chest pain likely due to coughing at night  - Patient requesting cough medicine on discharge-> reports OTC medications do not work, but what ever he received last night worked well  - No significant worsening of his pain after stopping colchicine, so will continue to hold  - No need for repeat ischemic testing at this  time  - Continue with aspirin, Brilinta and Xarelto until 6/17 and then stop only aspirin  - He is stable from a cardiac standpoint for discharge home today  - He has a follow-up appointment with cardiology on 6/17 with Dr. Sheppard    Subjective:   No significant events overnight.  He reports some mild persistent chest discomfort, but overall improved.  He has significant coughing last night again.  Denies shortness of breath, orthopnea, abdominal pain, nausea, vomiting, fever, chills, headache, dizziness or palpitations.    Objective:     Vitals: Blood pressure 148/89, pulse 59, temperature (!) 97.1 °F (36.2 °C), temperature source Temporal, resp. rate 18, weight 86.2 kg (190 lb), SpO2 98%., Body mass index is 32.61 kg/m².,   Orthostatic Blood Pressures      Flowsheet Row Most Recent Value   Blood Pressure 148/89 filed at 06/12/2024 0733   Patient Position - Orthostatic VS Lying filed at 06/12/2024 0733              Intake/Output Summary (Last 24 hours) at 6/12/2024 1017  Last data filed at 6/12/2024 0853  Gross per 24 hour   Intake 300 ml   Output --   Net 300 ml           Physical Exam:    GEN: Gulshan Arias appears well, alert and oriented x 3, pleasant and cooperative   HEENT: Mucous membranes moist, no scleral icterus, no conjunctival pallor  NECK: No elevated JVD  HEART: Regular rate and rhythm, normal S1 and S2, no murmurs or rubs   LUNGS: clear to auscultation bilaterally; no wheezes, rales, or rhonchi   ABDOMEN: normal bowel sounds, soft, no tenderness, no distention  EXTREMITIES: peripheral pulses normal; no lower extremity edema   NEURO: no focal findings   SKIN: No lesions or rashes on exposed skin        Current Facility-Administered Medications:     acetaminophen (TYLENOL) tablet 975 mg, 975 mg, Oral, Q8H PRN, Abdulaziz Poole PA-C, 975 mg at 06/12/24 0536    albuterol (PROVENTIL HFA,VENTOLIN HFA) inhaler 2 puff, 2 puff, Inhalation, Q6H PRN, Bridger Keane MD    ALPRAZolam  (XANAX) tablet 0.25 mg, 0.25 mg, Oral, BID PRN, Bridger Keane MD, 0.25 mg at 06/10/24 2123    amiodarone tablet 200 mg, 200 mg, Oral, Daily, Bridger Keane MD, 200 mg at 06/12/24 0807    amLODIPine (NORVASC) tablet 10 mg, 10 mg, Oral, Daily, Bridger Keane MD, 10 mg at 06/12/24 0807    aspirin (ECOTRIN LOW STRENGTH) EC tablet 81 mg, 81 mg, Oral, Daily, Bridger Keane MD, 81 mg at 06/12/24 0807    atorvastatin (LIPITOR) tablet 40 mg, 40 mg, Oral, Daily With Dinner, Bridger Keane MD, 40 mg at 06/11/24 1752    benzonatate (TESSALON PERLES) capsule 100 mg, 100 mg, Oral, TID, Bridger Keane MD, 100 mg at 06/12/24 0807    Diclofenac Sodium (VOLTAREN) 1 % topical gel 2 g, 2 g, Topical, Q6H PRN, Abdulaziz Poole PA-C, 2 g at 06/11/24 0335    fluticasone (ARNUITY ELLIPTA) 100 MCG/ACT inhaler 1 puff, 1 puff, Inhalation, Daily, Bridger Keane MD, 1 puff at 06/12/24 0809    guaiFENesin (ROBITUSSIN) oral liquid 200 mg, 200 mg, Oral, Q4H PRN, Abdulaziz Poole PA-C, 200 mg at 06/10/24 2123    losartan (COZAAR) tablet 100 mg, 100 mg, Oral, Daily, 100 mg at 06/12/24 0807 **AND** hydroCHLOROthiazide tablet 25 mg, 25 mg, Oral, Daily, Bridger Keane MD, 25 mg at 06/12/24 0807    insulin glargine (LANTUS) subcutaneous injection 28 Units 0.28 mL, 28 Units, Subcutaneous, HS, Bridger Keane MD, 28 Units at 06/11/24 2110    insulin lispro (HumALOG/ADMELOG) 100 units/mL subcutaneous injection 1-5 Units, 1-5 Units, Subcutaneous, TID AC, 1 Units at 06/11/24 1151 **AND** Fingerstick Glucose (POCT), , , TID AC, Bridger Keane MD    insulin lispro (HumALOG/ADMELOG) 100 units/mL subcutaneous injection 5 Units, 5 Units, Subcutaneous, TID With Meals, Bridger Keane MD, 5 Units at 06/11/24 1752    isosorbide mononitrate (IMDUR) 24 hr tablet 30 mg, 30 mg, Oral, Daily, Mariely Dasilva PA-C, 30 mg at 06/12/24  "0807    lidocaine (LIDODERM) 5 % patch 1 patch, 1 patch, Topical, Daily, Wayne Benavides MD, 1 patch at 06/12/24 0807    metoclopramide (REGLAN) injection 10 mg, 10 mg, Intravenous, Q6H PRN, Abdulaziz Poole PA-C, 10 mg at 06/11/24 2111    metoprolol succinate (TOPROL-XL) 24 hr tablet 50 mg, 50 mg, Oral, Daily, Bridger Keane MD, 50 mg at 06/12/24 0807    nicotine (NICODERM CQ) 14 mg/24hr TD 24 hr patch 1 patch, 1 patch, Transdermal, Daily, Bridger Keane MD, 1 patch at 06/10/24 1458    nicotine (NICODERM CQ) 14 mg/24hr TD 24 hr patch 1 patch, 1 patch, Transdermal, Daily, Bridger Keane MD, 1 patch at 06/12/24 0807    ondansetron (ZOFRAN) injection 4 mg, 4 mg, Intravenous, Q6H PRN, Giovany Jain MD    oxyCODONE (ROXICODONE) IR tablet 5 mg, 5 mg, Oral, Q4H PRN, Giovany Jain MD    pantoprazole (PROTONIX) EC tablet 40 mg, 40 mg, Oral, Daily Before Breakfast, Bridger Keane MD, 40 mg at 06/12/24 0536    PARoxetine (PAXIL) tablet 20 mg, 20 mg, Oral, Daily, Bridger Keane MD, 20 mg at 06/12/24 0807    ticagrelor (BRILINTA) tablet 90 mg, 90 mg, Oral, Q12H CIARA, Bridger Keane MD, 90 mg at 06/12/24 0807    zolpidem (AMBIEN) tablet 10 mg, 10 mg, Oral, HS PRN, Bridger Keane MD, 10 mg at 06/11/24 2109    Labs & Results:    Lab Results   Component Value Date    TROPONINI <0.02 04/25/2019    TROPONINI <0.02 04/25/2019    TROPONINI <0.02 04/25/2019       Lab Results   Component Value Date    CALCIUM 9.2 06/12/2024    K 3.4 (L) 06/12/2024    CO2 27 06/12/2024    CL 99 06/12/2024    BUN 16 06/12/2024    CREATININE 1.03 06/12/2024       Lab Results   Component Value Date    WBC 8.06 06/12/2024    HGB 14.1 06/12/2024    HCT 42.1 06/12/2024    MCV 92 06/12/2024     06/12/2024           No results found for: \"CHOL\"  Lab Results   Component Value Date    HDL 30 (L) 01/30/2024    HDL 24 (L) 09/29/2016     Lab Results   Component " Value Date    LDLCALC  01/30/2024      Comment:      Calculated LDL invalid, triglycerides >400 mg/dl  This screening LDL is a calculated result.   It does not have the accuracy of the Direct Measured LDL in the monitoring of patients with hyperlipidemia and/or statin therapy.   Direct Measure LDL (MVE771) must be ordered separately in these patients.    LDLCALC 94 09/29/2016     Lab Results   Component Value Date    TRIG 417 (H) 01/30/2024    TRIG 328 (H) 09/29/2016       Lab Results   Component Value Date    ALT 15 06/10/2024    AST 13 06/10/2024    ALKPHOS 74 06/10/2024         EKG personally reviewed by )Wayne Benavides MD. No acute changes   TELE: No significant arrhythmias seen on telemetry review.

## 2024-06-12 NOTE — RESTORATIVE TECHNICIAN NOTE
Restorative Technician Note      Patient Name: Gulshan Arias     Restorative Tech Visit Date: 06/12/24  Note Type: Mobility  Patient Position Upon Consult: Supine  Activity Performed: Ambulated; Range of motion  Assistive Device: Roller walker  Patient Position at End of Consult: Supine; All needs within reach

## 2024-06-12 NOTE — DISCHARGE SUMMARY
[facility]  Discharge- Gulshan Arias 1960, 63 y.o. male MRN: [id number]  Unit/Bed#: E4 -01 Encounter: [id number]  Primary Care Provider: Osei Cleaning MD   Date and time admitted to hospital: 6/10/2024  8:50 AM    * Other chest pain  Assessment & Plan  Recurrent chest pain after recent NSTEMI.  Possible Dressler syndrome versus coronary vasospasm versus stent occlusion   Cardiology input appreciated  Imdur added for microvascular ischemia  Colchicine discontinued given underlying renal dysfunction  He was still smoking since he was discharged  He also was not taking aspirin as prescribed (aspirin for 14 days together with Brilinta and Xarelto)  Discussed with Dr. Benavides  Continue ASA, Brilinta, Xarelto statin and beta-blocker Imdur  Pain significantly improved, patient will be discharged home today      NSTEMI (non-ST elevated myocardial infarction) (HCC)  Assessment & Plan  Recent admission for NSTEMI status post cardiac cath with drug-eluting stents placed to 99% OM 2 lesion.  His current troponin is trending down compared to a few days ago which is reassuring.  Continue triple therapy with aspirin Brilinta and Xarelto for 14 days  Switch simvastatin to Lipitor while in the hospital  Per DC summary, he was supposed to be on triple therapy for 14 days then dual therapy after  Emphasized tobacco cessation    Generalized anxiety disorder  Assessment & Plan  Continue Paxil daily.  PDMP reviewed and confirmed use of Xanax    Atrial fibrillation (HCC)  Assessment & Plan  Continue amiodarone 200 mg daily  Continue Xarelto for stroke prevention    Tobacco dependence  Assessment & Plan  Emphasized tobacco cessation.    Type 2 diabetes mellitus without complication, with long-term current use of insulin (HCC)  Assessment & Plan  Lab Results   Component Value Date    HGBA1C 9.5 (A) 01/04/2024     Results from last 7 days   Lab Units 06/12/24  0723 06/11/24  2111  06/11/24  1609 06/11/24  1114 06/11/24  0723   POC GLUCOSE mg/dl 109 162* 125 203* 127         Continue home regimen      Hyperlipidemia  Assessment & Plan  Continue statin    Essential hypertension  Assessment & Plan  Continue losartan, HCTZ and Norvasc         Transition of Care Discharge Summary - [facility]    Patient Information: Gulshan Arias 63 y.o. male MRN: [id number]  Unit/Bed#: E4 -01 Encounter: [id number]    Discharging Physician / Practitioner: Giovany Jain MD  PCP: Osei Cleaning MD  Admission Date: 6/10/2024  Discharge Date: 06/12/24    Disposition:      Other: home      Reason for Admission: NSTEMI    Discharge Diagnoses:     Principal Problem:    Other chest pain  Active Problems:    Essential hypertension    Hyperlipidemia    Type 2 diabetes mellitus without complication, with long-term current use of insulin (HCC)    Tobacco dependence    Atrial fibrillation (HCC)    Generalized anxiety disorder    NSTEMI (non-ST elevated myocardial infarction) (HCC)  Resolved Problems:    * No resolved hospital problems. *      Consultations During Hospital Stay:  IP CONSULT TO CARDIOLOGY      Procedures Performed:     none    Medication Adjustments and Discharge Medications:  Medication Dosing Tapers - Please refer to Discharge Medication List for details on any medication dosing tapers (if applicable to patient).  Discharge Medication List: See after visit summary for reconciled discharge medications.     Wound Care Recommendations:  When applicable, please see wound care section of After Visit Summary.    Diet Recommendations at Discharge:  Diet -   Fluid Restriction - No Fluid Restriction at Discharge.      Significant Findings / Test Results:     XR chest 1 view portable    Result Date: 6/10/2024  Impression: No acute cardiopulmonary disease. Workstation performed: [id number]      Hospital Course:     Gulshan Arias is a 63 y.o. male patient who  originally presented to the hospital on 6/10/2024 due to chest pain, NSTEMI, cardiology was consulted.  Patient was started on Imdur for microvascular ischemia and was also briefly on colchicine for possible Dressler syndrome however given kidney function this was later discontinued.  Patient was treated with lidocaine patches symptoms are improving he is otherwise stable for discharge home today with outpatient follow-up.  Have also sent prescription for Tessalon Perles given chronic cough.    Please see above problem list for further details.      Condition at Discharge: good     Discharge Day Visit / Exam:     Subjective: Patient seen and examined at bedside, states chest pain is much better rating it 3 out of 10.  Denies any nausea or vomiting    Vitals: Blood Pressure: 139/82 (06/12/24 1135)  Pulse: 64 (06/12/24 1135)  Temperature: (!) 97 °F (36.1 °C) (06/12/24 1135)  Temp Source: Temporal (06/12/24 1135)  Respirations: 18 (06/12/24 1135)  Weight - Scale: 86.2 kg (190 lb) (06/10/24 0854)  SpO2: 96 % (06/12/24 1135)    Physical Exam:    Constitutional: Patient is oriented to person, place and time, no acute distress  HEENT:  Normocephalic, atraumatic  Cardiovascular: Normal S1S2, RRR, No murmurs/rubs/gallops appreciated.  Pulmonary:  Bilateral air entry, No rhonchi/rales/wheezing appreciated  Abdominal: Soft, Bowel sounds present, Non-tender, Non-distended  Extremities:  No cyanosis, clubbing or edema.   Neurological: awake, alert    Discharge instructions/Information to patient and family:   See after visit summary section titled Discharge Instructions for information provided to patient and family.      Planned Readmission: no      Discharge Statement:  I spent 35 minutes discharging the patient. This time was spent on the day of discharge. I had direct contact with the patient on the day of discharge. Greater than 50% of the total time was spent examining patient, answering all patient questions, arranging  and discussing plan of care with patient as well as directly providing post-discharge instructions.  Additional time then spent on discharge activities.    ** Please Note: This note has been constructed using a voice recognition system **

## 2024-06-17 ENCOUNTER — OFFICE VISIT (OUTPATIENT)
Dept: CARDIOLOGY CLINIC | Facility: CLINIC | Age: 64
End: 2024-06-17
Payer: MEDICARE

## 2024-06-17 VITALS
SYSTOLIC BLOOD PRESSURE: 138 MMHG | DIASTOLIC BLOOD PRESSURE: 76 MMHG | WEIGHT: 178.8 LBS | BODY MASS INDEX: 30.52 KG/M2 | HEART RATE: 74 BPM | HEIGHT: 64 IN

## 2024-06-17 DIAGNOSIS — E11.9 TYPE 2 DIABETES MELLITUS WITHOUT COMPLICATION, WITH LONG-TERM CURRENT USE OF INSULIN (HCC): ICD-10-CM

## 2024-06-17 DIAGNOSIS — I25.10 CAD (CORONARY ARTERY DISEASE): ICD-10-CM

## 2024-06-17 DIAGNOSIS — I48.0 PAROXYSMAL ATRIAL FIBRILLATION (HCC): Primary | ICD-10-CM

## 2024-06-17 DIAGNOSIS — E78.5 DYSLIPIDEMIA: ICD-10-CM

## 2024-06-17 DIAGNOSIS — I21.4 NSTEMI (NON-ST ELEVATED MYOCARDIAL INFARCTION) (HCC): ICD-10-CM

## 2024-06-17 DIAGNOSIS — I10 ESSENTIAL HYPERTENSION: ICD-10-CM

## 2024-06-17 DIAGNOSIS — Z79.4 TYPE 2 DIABETES MELLITUS WITHOUT COMPLICATION, WITH LONG-TERM CURRENT USE OF INSULIN (HCC): ICD-10-CM

## 2024-06-17 DIAGNOSIS — I48.91 RAPID ATRIAL FIBRILLATION (HCC): ICD-10-CM

## 2024-06-17 PROCEDURE — 99214 OFFICE O/P EST MOD 30 MIN: CPT | Performed by: INTERNAL MEDICINE

## 2024-06-17 PROCEDURE — 93000 ELECTROCARDIOGRAM COMPLETE: CPT | Performed by: INTERNAL MEDICINE

## 2024-06-17 RX ORDER — ATORVASTATIN CALCIUM 80 MG/1
80 TABLET, FILM COATED ORAL DAILY
Qty: 90 TABLET | Refills: 3 | Status: SHIPPED | OUTPATIENT
Start: 2024-06-17 | End: 2024-09-15

## 2024-06-17 RX ORDER — ISOSORBIDE MONONITRATE 30 MG/1
30 TABLET, EXTENDED RELEASE ORAL DAILY
Qty: 90 TABLET | Refills: 3 | Status: SHIPPED | OUTPATIENT
Start: 2024-06-17 | End: 2024-09-15

## 2024-06-17 RX ORDER — AMIODARONE HYDROCHLORIDE 200 MG/1
200 TABLET ORAL DAILY
Qty: 90 TABLET | Refills: 3 | Status: SHIPPED | OUTPATIENT
Start: 2024-06-17 | End: 2024-09-15

## 2024-06-17 RX ORDER — METOPROLOL SUCCINATE 50 MG/1
50 TABLET, EXTENDED RELEASE ORAL DAILY
Qty: 90 TABLET | Refills: 3 | Status: SHIPPED | OUTPATIENT
Start: 2024-06-17 | End: 2024-09-15

## 2024-06-17 NOTE — PROGRESS NOTES
Cardiology Office Note  MD Wayne Mcgrath MD, [facility]  Ashok Sheppard DO, [facility]  MD Edna Haridng DO, [facility]  Usman Mcdonald DO, [facility]  ----------------------------------------------------------------  [address] [address]    Gulshan Arias 63 y.o. male MRN: [id number]  Unit/Bed#:  Encounter: [id number]      History of Present Illness:  It was a pleasure to see Gulshan Arias in the office today for follow-up CV evaluation. He has a past medical history of paroxysmal atrial fibrillation, hypertension, dyslipidemia, type 2 diabetes mellitus, GERD and tobacco use.  He establish care with us in February 2024.  Patient was seen by Dr. Lakhani in 2018 for his atrial fibrillation.  At that time, he was recommended for Holter monitor and sleep study.  He was started on Eliquis and subsequently was lost to follow-up.  Patient unfortunately had been unable to afford Eliquis due to the high cost.  Over the course of 2023 into 2024, he began to experience a chronic cough.  He continued smoking at that time and noted some significant dyspnea on exertion.  No significant chest discomfort was associated with the symptoms.  He also had some inability to climb stairs due to his exertional dyspnea.  He was seen by primary care and noted to be back in atrial fibrillation and was referred to cardiology office for evaluation.  Patient was initially started on amiodarone, but he did not take the medication.  He reported some GI symptoms and stopped many of his medications.  He was found to have a 43% burden of atrial fibrillation on his monitor in March 2024.  After multiple repeat attempts, he had not been following up in the office and was a no-show for appointments.  Subsequently he developed acute myocardial infarction in June 2024.  He was also simultaneously in rapid atrial fibrillation.  The patient was started on amiodarone  with restoration of sinus rhythm. He underwent percutaneous intervention with ASHVIN x 2 to the OM2.  He had residual 60% circumflex disease.  He was initially placed on aspirin, Brilinta and Xarelto.  He was then seen back in the hospital again on [place] 10, 2024 due to recurrent chest discomfort.  Troponins trended downward and symptoms are most consistent with musculoskeletal etiology.  There is also concern for microvascular disease for which she was placed on isosorbide.    Denies any further chest pain, pressure, tightness or squeezing.  Denies lightheadedness, dizziness or palpitations.  Denies lower extremity swelling, orthopnea or paroxysmal nocturnal dyspnea.    Review of Systems:  Review of Systems   Constitutional: Positive for malaise/fatigue. Negative for decreased appetite, fever, weight gain and weight loss.   HENT:  Negative for congestion and sore throat.    Eyes:  Negative for visual disturbance.   Cardiovascular:  Negative for chest pain, dyspnea on exertion, leg swelling, near-syncope and palpitations.   Respiratory:  Negative for cough and shortness of breath.    Hematologic/Lymphatic: Negative for bleeding problem.   Skin:  Negative for rash.   Musculoskeletal:  Negative for myalgias and neck pain.   Gastrointestinal:  Negative for abdominal pain and nausea.   Neurological:  Negative for light-headedness and weakness.   Psychiatric/Behavioral:  Negative for depression.        Past Medical History:   Diagnosis Date    Depression     Diabetes mellitus (HCC)     Hyperlipidemia     Hypertension     Tobacco abuse        Past Surgical History:   Procedure Laterality Date    APPENDECTOMY      CARDIAC CATHETERIZATION N/A 6/3/2024    Procedure: Cardiac catheterization;  Surgeon: Scott Crawford MD;  Location: [facility];  Service: Cardiology    CARDIAC CATHETERIZATION N/A 6/3/2024    Procedure: Cardiac pci;  Surgeon: Scott Crawford MD;  Location: [facility];  Service: Cardiology     CARDIAC CATHETERIZATION N/A 6/3/2024    Procedure: Cardiac Coronary Angiogram;  Surgeon: Scott Crawford MD;  Location: [facility];  Service: Cardiology    SHOULDER ARTHROSCOPY         Social History     Socioeconomic History    Marital status: /Civil Union     Spouse name: None    Number of children: None    Years of education: None    Highest education level: None   Occupational History    None   Tobacco Use    Smoking status: Every Day     Current packs/day: 1.00     Average packs/day: 1 pack/day for 1.5 years (1.5 ttl pk-yrs)     Types: Cigarettes     Start date: 2023     Passive exposure: Current    Smokeless tobacco: Current   Vaping Use    Vaping status: Never Used   Substance and Sexual Activity    Alcohol use: No    Drug use: No    Sexual activity: None   Other Topics Concern    None   Social History Narrative    None     Social Determinants of Health     Financial Resource Strain: Low Risk  (1/4/2024)    Overall Financial Resource Strain (CARDIA)     Difficulty of Paying Living Expenses: Not hard at all   Food Insecurity: No Food Insecurity (6/11/2024)    Hunger Vital Sign     Worried About Running Out of Food in the Last Year: Never true     Ran Out of Food in the Last Year: Never true   Transportation Needs: No Transportation Needs (6/11/2024)    PRAPARE - Transportation     Lack of Transportation (Medical): No     Lack of Transportation (Non-Medical): No   Physical Activity: Not on file   Stress: Not on file   Social Connections: Not on file   Intimate Partner Violence: Not on file   Housing Stability: Low Risk  (6/11/2024)    Housing Stability Vital Sign     Unable to Pay for Housing in the Last Year: No     Number of Times Moved in the Last Year: 0     Homeless in the Last Year: No       Family History   Problem Relation Age of Onset    Heart disease Father        No Known Allergies      Current Outpatient Medications:     albuterol (PROVENTIL HFA,VENTOLIN HFA) 90 mcg/act inhaler,  Inhale 2 puffs every 6 (six) hours as needed for wheezing or shortness of breath, Disp: 8 g, Rfl: 1    ALPRAZolam (XANAX) 0.25 mg tablet, TAKE 1 TABLET BY MOUTH TWICE DAILY AS NEEDED FOR ANXIETY, Disp: 60 tablet, Rfl: 0    amiodarone 200 mg tablet, Take 1 tablet (200 mg total) by mouth daily, Disp: 90 tablet, Rfl: 3    amLODIPine (NORVASC) 10 mg tablet, Take 1 tablet (10 mg total) by mouth daily, Disp: 90 tablet, Rfl: 1    atorvastatin (LIPITOR) 80 mg tablet, Take 1 tablet (80 mg total) by mouth daily, Disp: 90 tablet, Rfl: 3    benzonatate (TESSALON PERLES) 100 mg capsule, Take 1 capsule (100 mg total) by mouth 3 (three) times a day, Disp: 20 capsule, Rfl: 0    glucose blood test strip, 1 each by Other route 2 (two) times a day Use as instructed, Disp: 100 each, Rfl: 1    Insulin Glargine Solostar (Lantus SoloStar) 100 UNIT/ML SOPN, Inject 0.28 mL (28 Units total) under the skin daily at bedtime, Disp: 21 mL, Rfl: 1    isosorbide mononitrate (IMDUR) 30 mg 24 hr tablet, Take 1 tablet (30 mg total) by mouth daily, Disp: 90 tablet, Rfl: 3    Lancets Misc. (Accu-Chek FastClix Lancet) KIT, Inject under the skin 2 (two) times a day, Disp: 100 kit, Rfl: 2    lidocaine (LIDODERM) 5 %, Apply 1 patch topically over 12 hours daily Remove & Discard patch within 12 hours or as directed by MD, Disp: 10 patch, Rfl: 0    losartan-hydrochlorothiazide (HYZAAR) 100-25 MG per tablet, Take 1 tablet by mouth daily, Disp: 90 tablet, Rfl: 1    metFORMIN (GLUCOPHAGE) 1000 MG tablet, Take 1 tablet (1,000 mg total) by mouth 2 (two) times a day with meals, Disp: 90 tablet, Rfl: 3    metoprolol succinate (TOPROL-XL) 50 mg 24 hr tablet, Take 1 tablet (50 mg total) by mouth daily, Disp: 90 tablet, Rfl: 3    oxyCODONE (ROXICODONE) 30 MG immediate release tablet, , Disp: , Rfl:     pantoprazole (PROTONIX) 40 mg tablet, Take 1 tablet (40 mg total) by mouth daily, Disp: 30 tablet, Rfl: 1    PARoxetine (PAXIL) 20 mg tablet, Take 1 tablet (20 mg  "total) by mouth daily, Disp: 30 tablet, Rfl: 2    rivaroxaban (Xarelto) 15 mg tablet, Take 1 tablet (15 mg total) by mouth daily with breakfast, Disp: 90 tablet, Rfl: 3    ticagrelor (BRILINTA) 90 MG, Take 1 tablet (90 mg total) by mouth every 12 (twelve) hours, Disp: 60 tablet, Rfl: 0    zolpidem (AMBIEN) 10 mg tablet, Take 10 mg by mouth daily at bedtime as needed for sleep, Disp: , Rfl:     dulaglutide (Trulicity) 0.75 MG/0.5ML injection, Inject 0.5 mL (0.75 mg total) under the skin every 7 days (Patient not taking: Reported on 6/10/2024), Disp: 6 mL, Rfl: 1    fluticasone (Flovent HFA) 44 mcg/act inhaler, Inhale 2 puffs 2 (two) times a day Rinse mouth after use. (Patient not taking: Reported on 6/10/2024), Disp: 10.6 g, Rfl: 1    Insulin Pen Needle (TechLite Pen Needles) 31G X 8 MM MISC, Apply topically 4 (four) times a day, Disp: 360 each, Rfl: 0    nicotine (NICODERM CQ) 14 mg/24hr TD 24 hr patch, Place 1 patch on the skin over 24 hours daily (Patient not taking: Reported on 6/10/2024), Disp: 28 patch, Rfl: 0    Vitals:    06/17/24 1557   BP: 138/76   BP Location: Left arm   Patient Position: Sitting   Cuff Size: Adult   Pulse: 74   Weight: 81.1 kg (178 lb 12.8 oz)   Height: 5' 4\" (1.626 m)       Body mass index is 30.69 kg/m².    PHYSICAL EXAMINATION:  Gen: Awake, Alert, NAD   Head/eyes: AT/NC, pupils equal and round, Anicteric  ENT: mmm  Neck: Supple, No elevated JVP, trachea midline  Resp: Decreased breath sounds otherwise clear  CV: RRR +S1, S2, No m/r/g  Abd: Soft, NT/ND + BS  Ext: no LE edema bilaterally  Neuro: Follows commands, moves all extermities  Psych: Appropriate affect, normal mood, pleasant attitude, non-combative  Skin: warm; no rash, erythema or venous stasis changes on exposed skin    --------------------------------------------------------------------------------  TREADMILL STRESS  Results for orders placed during the hospital encounter of 09/28/16    Stress test only, " exercise    Narrative  [facility]  [address] [address]  [phone]    Stress Electrocardiography during Exercise    Patient: GAURI HOROWITZ  MR number: [id number]  Account number: [id number]  : 1960  Age: 55 years  Gender: Male  Status: Outpatient  [facility]  Height: 74 in  Weight: 188 lb  BP: /    Allergies: NO KNOWN ALLERGIES    Referring Physician:  Jamison Mejia MD  Interpreting Physician:  Tan Lam MD    INDICATIONS: Detection of coronary artery disease.    HISTORY: ecu pt here for exercise stress test walked in with cane The patient  is a 55 year old  male. Chest pain status: chest pain. Coronary artery  disease risk factors: dyslipidemia, hypertension, and diabetes mellitus.  Cardiovascular history: none significant. Co-morbidity: obesity. Medications: a  calcium channel blocker, aspirin, a lipid lowering agent, and diabetic  medications.    PHYSICAL EXAM: Baseline physical exam screening: no wheezes audible.    REST ECG: Atrial fibrillation. RVR. Nonspecific ST and T wave abnormalities  were present.    PROCEDURE: The study was performed in the stress lab. Treadmill exercise  testing was performed, using the [place] protocol. Stress electrocardiographic  evaluation was performed.    HAROON PROTOCOL:  Symptoms  Baseline none  DR LAM CALLED TO ASSESS PT. PRE STRESS ECG PATEINT IN UNCONTROLED ATRIAL  FIBRILLATION NOT PRESENT ON ER ECG. DISCUSSING SITUATION WITH PATIENT AND  RHYTHM RETURNED TO SINUS RHYTHM. DR LAM CANCELLED STRESS TEST AND SENT  PATIENT TO ER TO EVALUATION OF PAF    STRESS SUMMARY: CANCELLED BY CARDIOLOGY Rapid atrial fibrillation. The baseline  ECG was abnormal due to arrhythmia. Stress cancelled.    SUMMARY:  -  ECG conclusions: The baseline ECG was abnormal due to arrhythmia. Stress  cancelled.  -  Comparisons to previous: No previous study is available for  comparison.    IMPRESSIONS: Not performed secondary to rapid AF. Nondiagnostic study.    PREVIOUS STUDY COMPARISON: No previous study is available for comparison.    Prepared and signed by    Tan Lam MD  Signed 2016 14:00:43     --------------------------------------------------------------------------------  NUCLEAR STRESS TEST: No results found for this or any previous visit.    No results found for this or any previous visit.      --------------------------------------------------------------------------------  CATH:  No results found for this or any previous visit.    --------------------------------------------------------------------------------  ECHO:   Results for orders placed during the hospital encounter of 16    Echo complete with contrast if indicated    Narrative  [address] [address] [phone]    Transthoracic Echocardiogram  2D, M-mode, Doppler, and Color Doppler    Study date:  29-Sep-2016    Patient: GAURI HOROWITZ  MR number: [id number]  Account number: [id number]  : 1960  Age: 55 years  Gender: Male  Status: Inpatient  Location: Bedside  Height: 64 in  Weight: 187 lb  BP: 180/ 96 mmHg    Indications: Atrial fibrillation    Diagnoses: I48.0 - Atrial fibrillation    Sonographer:  MOSHE Colon  Referring Physician:  Tariq PA-C  Group:  [facility]  Interpreting Physician:  DO CAROL More    LEFT VENTRICLE:  Systolic function was normal. Ejection fraction was estimated in the range of  55 % to 60 %.  There were no regional wall motion abnormalities.  Wall thickness was mildly increased.  There was mild concentric hypertrophy.    MITRAL VALVE:  There was mild regurgitation.    AORTIC VALVE:  There was trace regurgitation.    HISTORY: PRIOR HISTORY: Hypertension, hyperlipidemia, DM, tobacco abuse    PROCEDURE: The procedure was performed at the bedside.  This was a routine  study. The transthoracic approach was used. The study included complete 2D  imaging, M-mode, complete spectral Doppler, and color Doppler. The heart rate  was 76 bpm, at the start of the study. Images were obtained from the  parasternal, apical, subcostal, and suprasternal notch acoustic windows. Image  quality was adequate.    LEFT VENTRICLE: Size was normal. Systolic function was normal. Ejection  fraction was estimated in the range of 55 % to 60 %. There were no regional  wall motion abnormalities. Wall thickness was mildly increased. There was mild  concentric hypertrophy. DOPPLER: Left ventricular diastolic function parameters  were normal.    RIGHT VENTRICLE: The size was normal. Systolic function was normal. Wall  thickness was normal.    LEFT ATRIUM: Size was normal.    RIGHT ATRIUM: Size was normal.    MITRAL VALVE: Valve structure was normal. There was normal leaflet separation.  DOPPLER: The transmitral velocity was within the normal range. There was no  evidence for stenosis. There was mild regurgitation.    AORTIC VALVE: The valve was trileaflet. Leaflets exhibited normal thickness and  normal cuspal separation. DOPPLER: Transaortic velocity was within the normal  range. There was no evidence for stenosis. There was trace regurgitation.    TRICUSPID VALVE: The valve structure was normal. There was normal leaflet  separation. DOPPLER: The transtricuspid velocity was within the normal range.  There was no evidence for stenosis. There was no regurgitation.    PULMONIC VALVE: Leaflets exhibited normal thickness, no calcification, and  normal cuspal separation. DOPPLER: The transpulmonic velocity was within the  normal range. There was no regurgitation.    PERICARDIUM: There was no pericardial effusion.    AORTA: The root exhibited normal size.    SYSTEMIC VEINS: IVC: The inferior vena cava was normal in size and course.  Respirophasic changes were normal.    PULMONARY ARTERY: DOPPLER:  The tricuspid jet envelope definition was inadequate  for estimation of RV systolic pressure.    SYSTEM MEASUREMENT TABLES    2D  %FS: 32 %  Ao Diam: 3.5 cm  EDV(Teich): 99.5 ml  EF Biplane: 44.9 %  EF(Teich): 60.2 %  ESV(Teich): 39.6 ml  IVSd: 1 cm  LA Area: 19.1 cm2  LA Diam: 3.2 cm  LVEDV MOD A2C: 111.6 ml  LVEDV MOD A4C: 135.4 ml  LVEDV MOD BP: 129.3 ml  LVEF MOD A2C: 39 %  LVEF MOD A4C: 47.2 %  LVESV MOD A2C: 68 ml  LVESV MOD A4C: 71.5 ml  LVESV MOD BP: 71.3 ml  LVIDd: 4.6 cm  LVIDs: 3.2 cm  LVLd A2C: 8.4 cm  LVLd A4C: 9.4 cm  LVLs A2C: 7.7 cm  LVLs A4C: 7.3 cm  LVPWd: 1 cm  RA Area: 11.2 cm2  RVIDd: 3 cm  SV MOD A2C: 43.5 ml  SV MOD A4C: 63.9 ml  SV(Teich): 59.9 ml    CW  AR Dec [place]: 2.3 m/s2  AR Dec Time: 1633.1 ms  AR PHT: 473.6 ms  AR Vmax: 3.8 m/s  AR maxP.5 mmHg    MM  TAPSE: 2.2 cm    PW  E': 0.1 m/s  E/E': 10.2  MV A Francois: 0.7 m/s  MV Dec [place]: 5.1 m/s2  MV DecT: 160.6 ms  MV E Francois: 0.8 m/s  MV E/A Ratio: 1.3  MV PHT: 46.6 ms  MVA By PHT: 4.7 cm2    [facility]    Prepared and electronically signed by    Edna Ac DO  Signed 29-Sep-2016 15:25:44    LVEF 55-60%, mild LVH, normal diastolic function, mild MR, trace TR, 2016    --------------------------------------------------------------------------------  HOLTER  No results found for this or any previous visit.    Results for orders placed during the hospital encounter of 18    Holter monitor - 48 hour    Narrative  PATIENT NAME:  Gulshan Arias    AGE:  57 y.o.       Sex:  male  MRN:  [id number]      PROCEDURE: Holter monitor - 48 hour      INDICATIONS:  Paroxysmal atrial fibrillation    HOLTER FINDINGS:    The patient was monitored for 48 continuous hours.  This revealed the patient to be in sinus rhythm with an average rate of 77 BPM; a minimum rate of 58 BPM; and a maximum rate of 146 BPM.    There were a total of 329 premature ventricular contractions.  There  "were no ventricular runs or arrhythmias. There were a total of 283 premature atrial contractions.  There was 1 atrial couplet, but no supraventricular runs or arrhythmias.  There was no evidence of atrial fibrillation or atrial flutter.  There was no significant bradycardia.  There was no evidence of heart block, and no significant pauses were seen.  There was approximately 1 hr of tachycardia. This was all sinus tachycardia.    Impression  1. The patient was in Sinus rhythm throughout the duration of the study.  2. The average heart rate was 77 bpm.  3. Occasional PACs and PVCs  4. No arrhythmias were noted.  5. No symptoms reported.    --------------------------------------------------------------------------------  CAROTIDS  No results found for this or any previous visit.     --------------------------------------------------------------------------------  ECGs:  Results for orders placed or performed in visit on 06/17/24   POCT ECG    Impression    Sinus rhythm 74 bpm with lateral T wave abnormalities, QTc 488 ms          Lab Results   Component Value Date    WBC 8.06 06/12/2024    HGB 14.1 06/12/2024    HCT 42.1 06/12/2024    MCV 92 06/12/2024     06/12/2024      Lab Results   Component Value Date    SODIUM 136 06/12/2024    K 3.4 (L) 06/12/2024    CL 99 06/12/2024    CO2 27 06/12/2024    BUN 16 06/12/2024    CREATININE 1.03 06/12/2024    GLUC 194 (H) 06/12/2024    CALCIUM 9.2 06/12/2024      Lab Results   Component Value Date    HGBA1C 9.5 (A) 01/04/2024      No results found for: \"CHOL\"  Lab Results   Component Value Date    HDL 30 (L) 01/30/2024    HDL 24 (L) 09/29/2016     Lab Results   Component Value Date    LDLCALC  01/30/2024      Comment:      Calculated LDL invalid, triglycerides >400 mg/dl  This screening LDL is a calculated result.   It does not have the accuracy of the Direct Measured LDL in the monitoring of patients with hyperlipidemia and/or statin therapy.   Direct Measure LDL (KRT000) " "must be ordered separately in these patients.    LDLCALC 94 09/29/2016     Lab Results   Component Value Date    TRIG 417 (H) 01/30/2024    TRIG 328 (H) 09/29/2016     No results found for: \"CHOLHDL\"   Lab Results   Component Value Date    INR 0.91 06/02/2024    INR 1.33 (H) 03/27/2024    INR 0.95 12/01/2017    PROTIME 12.5 06/02/2024    PROTIME 16.7 (H) 03/27/2024    PROTIME 12.7 12/01/2017        1. Paroxysmal atrial fibrillation (HCC)  -     POCT ECG  -     rivaroxaban (Xarelto) 15 mg tablet; Take 1 tablet (15 mg total) by mouth daily with breakfast  -     amiodarone 200 mg tablet; Take 1 tablet (200 mg total) by mouth daily  -     metoprolol succinate (TOPROL-XL) 50 mg 24 hr tablet; Take 1 tablet (50 mg total) by mouth daily  2. Essential hypertension  3. Dyslipidemia  -     atorvastatin (LIPITOR) 80 mg tablet; Take 1 tablet (80 mg total) by mouth daily  4. Type 2 diabetes mellitus without complication, with long-term current use of insulin (HCC)  5. NSTEMI (non-ST elevated myocardial infarction) (HCC)  -     POCT ECG  -     Ambulatory  Referral to Cardiac Rehabilitation; Future  -     ticagrelor (BRILINTA) 90 MG; Take 1 tablet (90 mg total) by mouth every 12 (twelve) hours  6. Rapid atrial fibrillation (HCC)  7. CAD (coronary artery disease)  -     isosorbide mononitrate (IMDUR) 30 mg 24 hr tablet; Take 1 tablet (30 mg total) by mouth daily        IMPRESSION:    CAD   NSTEMI s/p ASHVIN-OM2 x2 w/ residual 60% LCx and mild disease of RCA, June 2024  Ischemic cardiomyopathy  LVEF 45%, moderate LVH, severe hypokinesis of the basal to mid anterolateral wall, grade 2 diastolic dysfunction, mild AR/MR, June 2024  Paroxysmal atrial fibrillation on Eliquis  43% burden atrial fibrillation by event recorder, March 2024  Holter w/ SR avg HR 77 bpm, occasional PACs and PVCs, December 2018  Hypertension  Dyslipidemia  Type 2 diabetes mellitus  Tobacco use    PLAN:  It was a pleasure to see Gulshan in the office today for " "follow-up CV evaluation.  He is here today for follow-up after his myocardial infarction.  He has no chest pain concerning for angina and no signs or symptoms of heart failure.  Clinically euvolemic.  Blood pressure and heart rate are currently stable.  He is tolerating his current medications that any reported adverse effects.  He can perform greater than 4 METS on a daily basis without significant exertional symptoms.  ECG shows lateral T wave abnormalities similar to prior with some degree of improvement.  He is now back in sinus rhythm on the amiodarone completing his load.  Based on his clinical presentation, I have the following recommendations:    1.  Recommend minimum 1 year dual antiplatelet therapy with Xarelto and Brilinta.  Risks of noncompliance discussed including myocardial infarction up to including death.  2.  Electrophysiology appointment for late August 2024.  For now, continue amiodarone.  Patient to follow with EP to discuss possible alternate antiarrhythmic drug or ablation procedure.  3.  Continue losartan, hydrochlorothiazide, metoprolol, isosorbide and amlodipine for antihypertensive control and antianginal effect.  4.  Discontinue simvastatin and start atorvastatin 80 mg daily.  Repeat lipid panel in 3 to 6 months.  Goal LDL is less than 70 mg/dL.  5.  Recommend cardiac rehabilitation to build cardiovascular endurance.  6.  Recommend a heart healthy diet low in sodium and carbohydrate.  7.  Follow-up pulmonary regarding chronic cough.  Tobacco cessation advised.  8.  Should have any recurrence of his symptoms, especially worsening in frequency or severity or change in quality, recommend seeking immediate medical attention/dial 911.  9.  We will follow-up with him in 3 months after his evaluation with the EP.    As always, please do not hesitate to call with any questions.    Portions of the record may have been created with voice recognition software. Occasional wrong word or \"sound a like\" " substitutions may have occurred due to the inherent limitations of voice recognition software. Read the chart carefully and recognize, using context, where substitutions have occurred.      Signed: Ashok Sheppard DO, FACC, LEIGH ANN, FACP

## 2024-07-11 DIAGNOSIS — Z79.4 TYPE 2 DIABETES MELLITUS WITHOUT COMPLICATION, WITH LONG-TERM CURRENT USE OF INSULIN (HCC): ICD-10-CM

## 2024-07-11 DIAGNOSIS — E11.9 TYPE 2 DIABETES MELLITUS WITHOUT COMPLICATION, WITH LONG-TERM CURRENT USE OF INSULIN (HCC): ICD-10-CM

## 2024-07-12 RX ORDER — INSULIN GLARGINE 100 [IU]/ML
28 INJECTION, SOLUTION SUBCUTANEOUS
Qty: 21 ML | Refills: 1 | Status: SHIPPED | OUTPATIENT
Start: 2024-07-12

## 2024-09-03 DIAGNOSIS — I21.4 NSTEMI (NON-ST ELEVATED MYOCARDIAL INFARCTION) (HCC): ICD-10-CM

## 2024-09-10 RX ORDER — TICAGRELOR 90 MG/1
90 TABLET ORAL EVERY 12 HOURS
Qty: 60 TABLET | Refills: 5 | Status: SHIPPED | OUTPATIENT
Start: 2024-09-10

## 2024-12-04 ENCOUNTER — TELEPHONE (OUTPATIENT)
Age: 64
End: 2024-12-04

## 2024-12-23 DIAGNOSIS — I48.0 PAROXYSMAL ATRIAL FIBRILLATION (HCC): ICD-10-CM

## 2024-12-24 RX ORDER — RIVAROXABAN 15 MG/1
TABLET, FILM COATED ORAL
Qty: 90 TABLET | Refills: 1 | Status: SHIPPED | OUTPATIENT
Start: 2024-12-24

## 2025-01-10 ENCOUNTER — RA CDI HCC (OUTPATIENT)
Dept: OTHER | Facility: HOSPITAL | Age: 65
End: 2025-01-10

## 2025-01-13 DIAGNOSIS — E11.9 TYPE 2 DIABETES MELLITUS WITHOUT COMPLICATION, WITH LONG-TERM CURRENT USE OF INSULIN (HCC): ICD-10-CM

## 2025-01-13 DIAGNOSIS — Z79.4 TYPE 2 DIABETES MELLITUS WITHOUT COMPLICATION, WITH LONG-TERM CURRENT USE OF INSULIN (HCC): ICD-10-CM

## 2025-01-14 RX ORDER — DULAGLUTIDE 0.75 MG/.5ML
INJECTION, SOLUTION SUBCUTANEOUS
Qty: 6 ML | Refills: 4 | Status: SHIPPED | OUTPATIENT
Start: 2025-01-14 | End: 2025-01-16 | Stop reason: SDUPTHER

## 2025-01-16 ENCOUNTER — OFFICE VISIT (OUTPATIENT)
Dept: FAMILY MEDICINE CLINIC | Facility: CLINIC | Age: 65
End: 2025-01-16

## 2025-01-16 VITALS
HEIGHT: 64 IN | WEIGHT: 187.4 LBS | OXYGEN SATURATION: 98 % | BODY MASS INDEX: 31.99 KG/M2 | HEART RATE: 84 BPM | SYSTOLIC BLOOD PRESSURE: 144 MMHG | RESPIRATION RATE: 18 BRPM | DIASTOLIC BLOOD PRESSURE: 86 MMHG | TEMPERATURE: 98.2 F

## 2025-01-16 DIAGNOSIS — E11.9 TYPE 2 DIABETES MELLITUS WITHOUT COMPLICATION, WITH LONG-TERM CURRENT USE OF INSULIN (HCC): ICD-10-CM

## 2025-01-16 DIAGNOSIS — I10 ESSENTIAL HYPERTENSION: Primary | ICD-10-CM

## 2025-01-16 DIAGNOSIS — R39.198 DIFFICULTY URINATING: ICD-10-CM

## 2025-01-16 DIAGNOSIS — Z23 ENCOUNTER FOR IMMUNIZATION: ICD-10-CM

## 2025-01-16 DIAGNOSIS — Z79.4 TYPE 2 DIABETES MELLITUS WITHOUT COMPLICATION, WITH LONG-TERM CURRENT USE OF INSULIN (HCC): ICD-10-CM

## 2025-01-16 LAB — SL AMB POCT HEMOGLOBIN AIC: 9.4 (ref ?–6.5)

## 2025-01-16 PROCEDURE — G2211 COMPLEX E/M VISIT ADD ON: HCPCS | Performed by: FAMILY MEDICINE

## 2025-01-16 PROCEDURE — 99213 OFFICE O/P EST LOW 20 MIN: CPT | Performed by: FAMILY MEDICINE

## 2025-01-16 PROCEDURE — 83036 HEMOGLOBIN GLYCOSYLATED A1C: CPT | Performed by: FAMILY MEDICINE

## 2025-01-16 RX ORDER — DULAGLUTIDE 0.75 MG/.5ML
1 INJECTION, SOLUTION SUBCUTANEOUS WEEKLY
Qty: 6 ML | Refills: 4 | Status: SHIPPED | OUTPATIENT
Start: 2025-01-16

## 2025-01-16 RX ORDER — INSULIN GLARGINE 100 [IU]/ML
28 INJECTION, SOLUTION SUBCUTANEOUS
Qty: 21 ML | Refills: 1 | Status: SHIPPED | OUTPATIENT
Start: 2025-01-16

## 2025-01-16 RX ORDER — TAMSULOSIN HYDROCHLORIDE 0.4 MG/1
0.4 CAPSULE ORAL
Qty: 30 CAPSULE | Refills: 1 | Status: SHIPPED | OUTPATIENT
Start: 2025-01-16 | End: 2025-03-17

## 2025-01-16 NOTE — ASSESSMENT & PLAN NOTE
Lab Results   Component Value Date    HGBA1C 9.4 (A) 01/16/2025   Not well controlled   Currently on Lantus 28 units at night, Trulicity 0.75 mg weekly and metformin 1000 mg twice daily. Off Lantus for 1 month because he run out.   Last eye exam: Due for it   Last foot exam: Deferred, patient wants to be seeing by Podiatry   On Lipitor and losartan    Plan:  Continue current regimen, refill Lantus and increase Trulicity to 1 mg weekly.   IRIS exam ordered  Lifestyle increase encouraged  Follow-up in 3 months    Orders:    Albumin / creatinine urine ratio; Future    POCT hemoglobin A1c    Ambulatory Referral to Podiatry; Future    Insulin Glargine Solostar 100 UNIT/ML SOPN; Inject 0.28 mL (28 Units total) as directed daily at bedtime    Dulaglutide (Trulicity) 0.75 MG/0.5ML SOAJ; Inject 1 mg under the skin once a week    IRIS Diabetic eye exam

## 2025-01-16 NOTE — ASSESSMENT & PLAN NOTE
Suboptimally controlled in the clinic, but reports well controlled at home 120-110/60-80 mmHg  Fernie Hyzaar 100-25 mg qd and amlodipine 10 mg qd, reports compliance     Plan:  Continue current regimen  BP logs at home

## 2025-01-16 NOTE — ASSESSMENT & PLAN NOTE
Likely BPH  No PSA on file    Plan:  Tamsulosin 0.4 mg daily with dinner  PSA  Urology referral  Orders:    Ambulatory Referral to Urology; Future    PSA, Total Screen; Future    tamsulosin (FLOMAX) 0.4 mg; Take 1 capsule (0.4 mg total) by mouth daily with dinner

## 2025-01-16 NOTE — PROGRESS NOTES
Name: Gulshan Arias      : 1960      MRN: [id number]  Encounter Provider: Yahaira Hussein MD  Encounter Date: 2025   Encounter department: [facility]  :  Assessment & Plan  Type 2 diabetes mellitus without complication, with long-term current use of insulin (HCC)    Lab Results   Component Value Date    HGBA1C 9.4 (A) 2025   Not well controlled   Currently on Lantus 28 units at night, Trulicity 0.75 mg weekly and metformin 1000 mg twice daily. Off Lantus for 1 month because he run out.   Last eye exam: Due for it   Last foot exam: Deferred, patient wants to be seeing by Podiatry   On Lipitor and losartan    Plan:  Continue current regimen, refill Lantus and increase Trulicity to 1 mg weekly.   IRIS exam ordered  Lifestyle increase encouraged  Follow-up in 3 months    Orders:    Albumin / creatinine urine ratio; Future    POCT hemoglobin A1c    Ambulatory Referral to Podiatry; Future    Insulin Glargine Solostar 100 UNIT/ML SOPN; Inject 0.28 mL (28 Units total) as directed daily at bedtime    Dulaglutide (Trulicity) 0.75 MG/0.5ML SOAJ; Inject 1 mg under the skin once a week    IRIS Diabetic eye exam    Essential hypertension  Suboptimally controlled in the clinic, but reports well controlled at home 120-110/60-80 mmHg  Doss Hyzaar 100-25 mg qd and amlodipine 10 mg qd, reports compliance     Plan:  Continue current regimen  BP logs at home         Encounter for immunization    Orders:    Pneumococcal Conjugate Vaccine 20-valent (Pcv20)    Difficulty urinating  Likely BPH  No PSA on file    Plan:  Tamsulosin 0.4 mg daily with dinner  PSA  Urology referral  Orders:    Ambulatory Referral to Urology; Future    PSA, Total Screen; Future    tamsulosin (FLOMAX) 0.4 mg; Take 1 capsule (0.4 mg total) by mouth daily with dinner           History of Present Illness     63-year-old male presenting for management of hypertension and diabetes. Patient  "reports nocturia and decreased urine stream for the past 6 months or longer. Patient deferred BESSIE today, he would like to get something for relief before seeing a urologist.   He denies dysuria, hematuria, fever chills, genital sores, back pain, weight loss or testicular pain.  Patient denies history of prostate cancer    Benign Prostatic Hypertrophy  This is a chronic problem. Episode onset: unsure, but more than 6 months. The problem has been gradually worsening since onset. Irritative symptoms include frequency and nocturia. Irritative symptoms do not include urgency. Obstructive symptoms include dribbling, incomplete emptying and a weak stream. Pertinent negatives include no chills, dysuria, genital pain, hematuria, nausea or vomiting. He is sexually active. Past treatments include nothing.     Review of Systems   Constitutional:  Negative for chills, fatigue, fever and unexpected weight change.   HENT:  Negative for congestion and sore throat.    Eyes:  Negative for visual disturbance.   Cardiovascular:  Negative for chest pain and leg swelling.   Gastrointestinal:  Negative for abdominal pain, diarrhea, nausea and vomiting.   Endocrine: Negative for cold intolerance, heat intolerance, polydipsia, polyphagia and polyuria.   Genitourinary:  Positive for difficulty urinating, frequency, incomplete emptying and nocturia. Negative for decreased urine volume, dysuria, hematuria, penile discharge, penile pain, penile swelling, testicular pain and urgency.   Musculoskeletal:  Negative for back pain.   Skin:  Negative for rash.   Neurological:  Negative for headaches.   Psychiatric/Behavioral:  Positive for sleep disturbance.         Nocturia       Objective   /86 (BP Location: Right arm, Patient Position: Sitting, Cuff Size: Standard) Comment: Pt took bp med today  Pulse 84   Temp 98.2 °F (36.8 °C) (Temporal)   Resp 18   Ht 5' 4\" (1.626 m)   Wt 85 kg (187 lb 6.4 oz)   SpO2 98%   BMI 32.17 kg/m²    "   Physical Exam  Vitals and nursing note reviewed.   Constitutional:       General: He is not in acute distress.     Appearance: He is well-developed.   HENT:      Head: Normocephalic and atraumatic.      Right Ear: External ear normal.      Left Ear: External ear normal.      Nose: Nose normal.   Eyes:      Conjunctiva/sclera: Conjunctivae normal.   Cardiovascular:      Rate and Rhythm: Normal rate and regular rhythm.      Pulses: Normal pulses.      Heart sounds: Normal heart sounds. No murmur heard.  Pulmonary:      Effort: Pulmonary effort is normal. No respiratory distress.      Breath sounds: Normal breath sounds.   Abdominal:      General: Bowel sounds are normal. There is no distension.      Palpations: Abdomen is soft. There is no mass.      Tenderness: There is no abdominal tenderness. There is no guarding.      Comments: Bladder did not seem distended   Musculoskeletal:      Cervical back: Neck supple.      Right lower leg: No edema.      Left lower leg: No edema.   Skin:     General: Skin is warm and dry.      Capillary Refill: Capillary refill takes less than 2 seconds.   Neurological:      General: No focal deficit present.      Mental Status: He is alert and oriented to person, place, and time.   Psychiatric:         Mood and Affect: Mood normal.         Behavior: Behavior normal.

## 2025-01-22 ENCOUNTER — TELEPHONE (OUTPATIENT)
Dept: FAMILY MEDICINE CLINIC | Facility: CLINIC | Age: 65
End: 2025-01-22

## 2025-01-22 NOTE — TELEPHONE ENCOUNTER
first attempt to contact patient due to machine is down. left message to return my call on answering machine.

## 2025-01-27 DIAGNOSIS — I21.4 NSTEMI (NON-ST ELEVATED MYOCARDIAL INFARCTION) (HCC): ICD-10-CM

## 2025-01-28 ENCOUNTER — TELEPHONE (OUTPATIENT)
Dept: FAMILY MEDICINE CLINIC | Facility: CLINIC | Age: 65
End: 2025-01-28

## 2025-01-28 DIAGNOSIS — I10 ESSENTIAL HYPERTENSION: ICD-10-CM

## 2025-01-28 RX ORDER — AMLODIPINE BESYLATE 10 MG/1
10 TABLET ORAL DAILY
Qty: 90 TABLET | Refills: 1 | Status: SHIPPED | OUTPATIENT
Start: 2025-01-28

## 2025-01-28 RX ORDER — TICAGRELOR 90 MG/1
TABLET ORAL
Qty: 60 TABLET | Refills: 5 | Status: SHIPPED | OUTPATIENT
Start: 2025-01-28

## 2025-01-28 RX ORDER — LOSARTAN POTASSIUM AND HYDROCHLOROTHIAZIDE 25; 100 MG/1; MG/1
1 TABLET ORAL DAILY
Qty: 90 TABLET | Refills: 1 | Status: SHIPPED | OUTPATIENT
Start: 2025-01-28

## 2025-01-28 NOTE — TELEPHONE ENCOUNTER
Pt is in need of medication refills on,         losartan-hydrochlorothiazide (HYZAAR) 100-25 MG per tablet     .  amLODIPine (NORVASC) 10 mg tablet

## 2025-01-28 NOTE — TELEPHONE ENCOUNTER
Left message notifying patient our machine is down and we will not be able to proceed with today's appointment. Appointment is canceled.

## 2025-02-13 ENCOUNTER — TELEPHONE (OUTPATIENT)
Dept: FAMILY MEDICINE CLINIC | Facility: CLINIC | Age: 65
End: 2025-02-13

## 2025-02-22 DIAGNOSIS — R39.198 DIFFICULTY URINATING: ICD-10-CM

## 2025-02-25 RX ORDER — TAMSULOSIN HYDROCHLORIDE 0.4 MG/1
0.4 CAPSULE ORAL
Qty: 30 CAPSULE | Refills: 0 | Status: SHIPPED | OUTPATIENT
Start: 2025-02-25 | End: 2025-04-26

## 2025-03-18 DIAGNOSIS — R39.198 DIFFICULTY URINATING: ICD-10-CM

## 2025-03-19 RX ORDER — TAMSULOSIN HYDROCHLORIDE 0.4 MG/1
CAPSULE ORAL
Qty: 30 CAPSULE | Refills: 0 | Status: SHIPPED | OUTPATIENT
Start: 2025-03-19

## 2025-04-01 ENCOUNTER — TELEPHONE (OUTPATIENT)
Dept: CARDIOLOGY CLINIC | Facility: CLINIC | Age: 65
End: 2025-04-01

## 2025-04-10 ENCOUNTER — TELEPHONE (OUTPATIENT)
Dept: FAMILY MEDICINE CLINIC | Facility: CLINIC | Age: 65
End: 2025-04-10

## 2025-04-10 NOTE — TELEPHONE ENCOUNTER
PCP SIGNATURE NEEDED FOR [employer] [facility]/Prior-Authorization FORM RECEIVED VIA FAX AND PLACED IN PCP FOLDER TO BE DELIVERED AT ASSIGNED TIMES.     Trulicity 0.75mg

## 2025-04-11 DIAGNOSIS — E11.9 TYPE 2 DIABETES MELLITUS WITHOUT COMPLICATION, WITH LONG-TERM CURRENT USE OF INSULIN (HCC): ICD-10-CM

## 2025-04-11 DIAGNOSIS — I48.0 PAROXYSMAL ATRIAL FIBRILLATION (HCC): ICD-10-CM

## 2025-04-11 DIAGNOSIS — Z79.4 TYPE 2 DIABETES MELLITUS WITHOUT COMPLICATION, WITH LONG-TERM CURRENT USE OF INSULIN (HCC): ICD-10-CM

## 2025-04-11 DIAGNOSIS — R39.198 DIFFICULTY URINATING: ICD-10-CM

## 2025-04-11 RX ORDER — TAMSULOSIN HYDROCHLORIDE 0.4 MG/1
CAPSULE ORAL
Qty: 30 CAPSULE | Refills: 0 | OUTPATIENT
Start: 2025-04-11

## 2025-04-15 RX ORDER — METOPROLOL SUCCINATE 25 MG/1
25 TABLET, EXTENDED RELEASE ORAL DAILY
Qty: 90 TABLET | Refills: 2 | OUTPATIENT
Start: 2025-04-15

## 2025-04-21 ENCOUNTER — OFFICE VISIT (OUTPATIENT)
Dept: FAMILY MEDICINE CLINIC | Facility: CLINIC | Age: 65
End: 2025-04-21

## 2025-04-21 VITALS
OXYGEN SATURATION: 95 % | DIASTOLIC BLOOD PRESSURE: 80 MMHG | WEIGHT: 188 LBS | TEMPERATURE: 97.2 F | BODY MASS INDEX: 32.1 KG/M2 | HEART RATE: 59 BPM | SYSTOLIC BLOOD PRESSURE: 120 MMHG | RESPIRATION RATE: 16 BRPM | HEIGHT: 64 IN

## 2025-04-21 DIAGNOSIS — R39.198 DIFFICULTY URINATING: ICD-10-CM

## 2025-04-21 DIAGNOSIS — Z23 ENCOUNTER FOR IMMUNIZATION: ICD-10-CM

## 2025-04-21 DIAGNOSIS — R05.9 COUGH: ICD-10-CM

## 2025-04-21 DIAGNOSIS — E11.9 TYPE 2 DIABETES MELLITUS WITHOUT COMPLICATION, WITH LONG-TERM CURRENT USE OF INSULIN (HCC): Primary | ICD-10-CM

## 2025-04-21 DIAGNOSIS — Z79.4 TYPE 2 DIABETES MELLITUS WITHOUT COMPLICATION, WITH LONG-TERM CURRENT USE OF INSULIN (HCC): Primary | ICD-10-CM

## 2025-04-21 LAB — SL AMB POCT HEMOGLOBIN AIC: 9.1 (ref ?–6.5)

## 2025-04-21 PROCEDURE — 83036 HEMOGLOBIN GLYCOSYLATED A1C: CPT | Performed by: FAMILY MEDICINE

## 2025-04-21 PROCEDURE — G2211 COMPLEX E/M VISIT ADD ON: HCPCS | Performed by: FAMILY MEDICINE

## 2025-04-21 PROCEDURE — 99213 OFFICE O/P EST LOW 20 MIN: CPT | Performed by: FAMILY MEDICINE

## 2025-04-21 RX ORDER — ALBUTEROL SULFATE 90 UG/1
2 INHALANT RESPIRATORY (INHALATION) EVERY 6 HOURS PRN
Qty: 8 G | Refills: 1 | Status: SHIPPED | OUTPATIENT
Start: 2025-04-21

## 2025-04-21 RX ORDER — GUAIFENESIN/DEXTROMETHORPHAN 100-10MG/5
5 SYRUP ORAL 3 TIMES DAILY PRN
Qty: 237 ML | Refills: 0 | Status: SHIPPED | OUTPATIENT
Start: 2025-04-21 | End: 2025-05-07

## 2025-04-21 RX ORDER — INSULIN GLARGINE 100 [IU]/ML
20 INJECTION, SOLUTION SUBCUTANEOUS
Qty: 21 ML | Refills: 1 | Status: SHIPPED | OUTPATIENT
Start: 2025-04-21

## 2025-04-21 NOTE — ASSESSMENT & PLAN NOTE
Not well controlled, but improving 9.4 >9.1  Currently on Lantus 20 units at night and metformin 1000 mg twice daily. Off Trulicity for the past 2 months due to insurance transition  On Lipitor and losartan  Patient deferred Diabetic foot exam today. He would like to have his foot examined by Podiatry     Plan:  Continue current regimen   Trulicity pre authorization filled, signed and sent   IRIS exam and podiatry referral placed   Albumin/Cr   Glucometer and supplies sent. Patient advised to check daily before breakfast and bring logs in next visit. Patient educated on hyper and hypoglycemia symptoms   Lifestyle modifications heavily encouraged  Diabetes education referral provided   Follow-up in 3 months    Orders:    POCT hemoglobin A1c    Albumin / creatinine urine ratio; Future    Ambulatory Referral to Podiatry; Future    Ambulatory Referral to Gastroenterology; Future    Insulin Glargine Solostar 100 UNIT/ML SOPN; Inject 0.2 mL (20 Units total) as directed daily at bedtime    One Touch Verio IQ    glucose blood test strip; Use as instructed    Ambulatory Referral to Diabetic Education; Future

## 2025-04-21 NOTE — ASSESSMENT & PLAN NOTE
Likely due to BPH, however minimal improvement with Flomax   Patient was advise to go to lab to get PSA done which was ordered last visit   Urology referral  Orders:    Ambulatory Referral to Urology; Future

## 2025-04-21 NOTE — PROGRESS NOTES
Name: Gulshan Arias      : 1960      MRN: [id number]  Encounter Provider: Yahaira Hussein MD  Encounter Date: 2025   Encounter department: [facility]  :  Assessment & Plan  Type 2 diabetes mellitus without complication, with long-term current use of insulin (HCC)  Not well controlled, but improving 9.4 >9.1  Currently on Lantus 20 units at night and metformin 1000 mg twice daily. Off Trulicity for the past 2 months due to insurance transition  On Lipitor and losartan  Patient deferred Diabetic foot exam today. He would like to have his foot examined by Podiatry     Plan:  Continue current regimen   Trulicity pre authorization filled, signed and sent   IRIS exam and podiatry referral placed   Albumin/Cr   Glucometer and supplies sent. Patient advised to check daily before breakfast and bring logs in next visit. Patient educated on hyper and hypoglycemia symptoms   Lifestyle modifications heavily encouraged  Diabetes education referral provided   Follow-up in 3 months    Orders:    POCT hemoglobin A1c    Albumin / creatinine urine ratio; Future    Ambulatory Referral to Podiatry; Future    Ambulatory Referral to Gastroenterology; Future    Insulin Glargine Solostar 100 UNIT/ML SOPN; Inject 0.2 mL (20 Units total) as directed daily at bedtime    One Touch Verio IQ    glucose blood test strip; Use as instructed    Ambulatory Referral to Diabetic Education; Future    Cough  Chronic cough in the setting of tobacco dependence (1 pack day for about 8 years). Not ready to quit yet  Stable vital signs, no red flags  Refill albuterol as it is helpful to patient and trial of Robitussin   PFT order  Patient advised to try to reduce smoking and return to discuss quitting when ready   Orders:    albuterol (PROVENTIL HFA,VENTOLIN HFA) 90 mcg/act inhaler; Inhale 2 puffs every 6 (six) hours as needed for wheezing or shortness of breath    dextromethorphan-guaiFENesin  (ROBITUSSIN DM)  mg/5 mL syrup; Take 5 mL by mouth 3 (three) times a day as needed for cough for up to 16 days    Complete PFT with post bronchodilator; Future    Difficulty urinating  Likely due to BPH, however minimal improvement with Flomax   Patient was advise to go to lab to get PSA done which was ordered last visit   Urology referral  Orders:    Ambulatory Referral to Urology; Future        BMI Counseling: Body mass index is 32.27 kg/m². The BMI is above normal. Nutrition recommendations include decreasing portion sizes, decreasing fast food intake, limiting drinks that contain sugar and increasing intake of lean protein. Exercise recommendations include moderate physical activity 150 minutes/week. No pharmacotherapy was ordered. Rationale for BMI follow-up plan is due to patient being overweight or obese.       History of Present Illness     63-year-old male presenting for management of hypertension and diabetes.  Medications: Metformin and Lantus 20 U  HS. Patient was without insurance and was unable to get Trulicity for the past 2 months. Patient now has insurance and wants to go back to GLP-1  Blood glucose at home: Has not checked in the past 2 months   Diet: Does not follow a diabetic diet, but is eating less  Exercise: No formal exercise.     Cough  This is a chronic problem. The problem has been gradually worsening. The cough is Non-productive. Pertinent negatives include no chest pain, fever, headaches, hemoptysis, nasal congestion, rash, sore throat, shortness of breath, sweats or weight loss. Risk factors for lung disease include smoking/tobacco exposure. Treatments tried: Tessalon Perles. The treatment provided no relief.   Difficulty Urinating   This is a chronic problem. The current episode started more than 1 year ago. The problem has been gradually worsening. There has been no fever. He is Sexually active. There is No history of pyelonephritis. Pertinent negatives include no flank pain,  "frequency, hematuria, hesitancy or sweats. Treatments tried: Flomax. The treatment provided mild relief. There is no history of recurrent UTIs or a urological procedure.     Review of Systems   Constitutional:  Negative for fatigue, fever, unexpected weight change and weight loss.   HENT:  Negative for congestion and sore throat.    Eyes:  Negative for visual disturbance.   Respiratory:  Positive for cough. Negative for hemoptysis and shortness of breath.    Cardiovascular:  Negative for chest pain and leg swelling.   Gastrointestinal:  Negative for abdominal pain and diarrhea.   Endocrine: Negative for polydipsia, polyphagia and polyuria.   Genitourinary:  Positive for difficulty urinating. Negative for decreased urine volume, dysuria, flank pain, frequency, hematuria, hesitancy, penile discharge, penile pain, penile swelling and testicular pain.        Weak urine stream and nocturia    Skin:  Negative for rash.   Neurological:  Negative for headaches.   Psychiatric/Behavioral:          Nocturia       Objective   /80 (BP Location: Right arm, Patient Position: Sitting, Cuff Size: Standard)   Pulse 59   Temp (!) 97.2 °F (36.2 °C) (Temporal)   Resp 16   Ht 5' 4\" (1.626 m)   Wt 85.3 kg (188 lb)   SpO2 95%   BMI 32.27 kg/m²      Physical Exam  Vitals and nursing note reviewed.   Constitutional:       General: He is not in acute distress.     Appearance: He is well-developed. He is not ill-appearing.   HENT:      Head: Normocephalic and atraumatic.      Right Ear: External ear normal.      Left Ear: External ear normal.      Nose: Nose normal.   Eyes:      Conjunctiva/sclera: Conjunctivae normal.   Cardiovascular:      Rate and Rhythm: Normal rate and regular rhythm.      Pulses: Normal pulses.      Heart sounds: Normal heart sounds. No murmur heard.  Pulmonary:      Effort: Pulmonary effort is normal. No respiratory distress.      Breath sounds: Normal breath sounds. No wheezing or rhonchi.   Abdominal:     "  General: There is no distension.      Palpations: Abdomen is soft. There is no mass.      Tenderness: There is no abdominal tenderness. There is no guarding.   Musculoskeletal:      Cervical back: Neck supple.      Right lower leg: No edema.      Left lower leg: No edema.   Skin:     General: Skin is warm and dry.      Capillary Refill: Capillary refill takes less than 2 seconds.   Neurological:      General: No focal deficit present.      Mental Status: He is alert and oriented to person, place, and time.   Psychiatric:         Mood and Affect: Mood normal.         Behavior: Behavior normal.

## 2025-05-13 ENCOUNTER — TELEPHONE (OUTPATIENT)
Dept: DIABETES SERVICES | Facility: OTHER | Age: 65
End: 2025-05-13

## 2025-05-13 NOTE — TELEPHONE ENCOUNTER
Left detailed message. Advised patient to return my call to set up visit to initiate living well with diabetes classes. I have called the patient four previous times without a call back.

## 2025-06-11 DIAGNOSIS — E78.5 DYSLIPIDEMIA: ICD-10-CM

## 2025-06-11 DIAGNOSIS — I48.0 PAROXYSMAL ATRIAL FIBRILLATION (HCC): ICD-10-CM

## 2025-06-11 DIAGNOSIS — I25.10 CAD (CORONARY ARTERY DISEASE): ICD-10-CM

## 2025-06-11 RX ORDER — ISOSORBIDE MONONITRATE 30 MG/1
30 TABLET, EXTENDED RELEASE ORAL DAILY
Qty: 90 TABLET | Refills: 0 | Status: SHIPPED | OUTPATIENT
Start: 2025-06-11

## 2025-06-11 RX ORDER — ATORVASTATIN CALCIUM 80 MG/1
80 TABLET, FILM COATED ORAL
Qty: 90 TABLET | Refills: 0 | Status: SHIPPED | OUTPATIENT
Start: 2025-06-11

## 2025-06-12 ENCOUNTER — TELEPHONE (OUTPATIENT)
Dept: CARDIOLOGY CLINIC | Facility: CLINIC | Age: 65
End: 2025-06-12

## 2025-06-12 DIAGNOSIS — R05.9 COUGH: ICD-10-CM

## 2025-06-12 RX ORDER — AMIODARONE HYDROCHLORIDE 200 MG/1
200 TABLET ORAL DAILY
Qty: 90 TABLET | Refills: 2 | OUTPATIENT
Start: 2025-06-12

## 2025-06-13 RX ORDER — ALBUTEROL SULFATE 90 UG/1
INHALANT RESPIRATORY (INHALATION)
Qty: 8 G | Refills: 0 | Status: SHIPPED | OUTPATIENT
Start: 2025-06-13

## 2025-07-19 DIAGNOSIS — R05.9 COUGH: ICD-10-CM

## 2025-07-19 DIAGNOSIS — I10 ESSENTIAL HYPERTENSION: ICD-10-CM

## 2025-07-21 RX ORDER — ALBUTEROL SULFATE 90 UG/1
INHALANT RESPIRATORY (INHALATION)
Qty: 8.5 G | Refills: 0 | Status: SHIPPED | OUTPATIENT
Start: 2025-07-21

## 2025-07-21 RX ORDER — AMLODIPINE BESYLATE 10 MG/1
10 TABLET ORAL DAILY
Qty: 90 TABLET | Refills: 0 | Status: SHIPPED | OUTPATIENT
Start: 2025-07-21

## 2025-07-21 RX ORDER — LOSARTAN POTASSIUM AND HYDROCHLOROTHIAZIDE 25; 100 MG/1; MG/1
1 TABLET ORAL DAILY
Qty: 90 TABLET | Refills: 0 | Status: SHIPPED | OUTPATIENT
Start: 2025-07-21

## (undated) DEVICE — THE VASC BAND HEMOSTAT IS A COMPRESSION DEVICE TO ASSIST HEMOSTASIS OF ARTERIAL, VENOUS AND HEMODIALYSIS PERCUTANEOUS ACCESS SITES.: Brand: VASC BAND™ HEMOSTAT

## (undated) DEVICE — RADIFOCUS OPTITORQUE ANGIOGRAPHIC CATHETER: Brand: OPTITORQUE

## (undated) DEVICE — GUIDELINER CATHETERS ARE INTENDED TO BE USED IN CONJUNCTION WITH GUIDE CATHETERS TO ACCESS DISCRETE REGIONS OF THE CORONARY AND/OR PERIPHERAL VASCULATURE, AND TO FACILITATE PLACEMENT OF INTERVENTIONAL DEVICES.: Brand: GUIDELINER® V3 CATHETER

## (undated) DEVICE — BALLOON EUPHORA RX 2.5 X 15MM

## (undated) DEVICE — CATH GUIDE LAUNCHER 6FR EBU 3.5

## (undated) DEVICE — RUNTHROUGH NS EXTRA FLOPPY PTCA GUIDEWIRE: Brand: RUNTHROUGH

## (undated) DEVICE — CATH DIAG 5FR IMPULSE 100CM FR4

## (undated) DEVICE — Device: Brand: ASAHI SILVERWAY

## (undated) DEVICE — DGW .035 FC J3MM 260CM TEF: Brand: EMERALD

## (undated) DEVICE — GLIDESHEATH BASIC HYDROPHILIC COATED INTRODUCER SHEATH: Brand: GLIDESHEATH